# Patient Record
Sex: FEMALE | Race: WHITE | NOT HISPANIC OR LATINO | Employment: FULL TIME | URBAN - METROPOLITAN AREA
[De-identification: names, ages, dates, MRNs, and addresses within clinical notes are randomized per-mention and may not be internally consistent; named-entity substitution may affect disease eponyms.]

---

## 2017-02-20 DIAGNOSIS — F51.01 PRIMARY INSOMNIA: ICD-10-CM

## 2017-02-20 RX ORDER — ZOLPIDEM TARTRATE 10 MG/1
TABLET ORAL
Qty: 15 TABLET | Refills: 0 | Status: SHIPPED | OUTPATIENT
Start: 2017-02-20 | End: 2017-03-24

## 2017-02-20 NOTE — TELEPHONE ENCOUNTER
Pending Prescriptions:                       Disp   Refills    zolpidem (AMBIEN) 10 MG tablet [Pharmacy *15 tab*0            Sig: TAKE ONE-HALF TABLET BY MOUTH EVERY NIGHT AT           BEDTIME AS NEEDED FOR SLEEP          Last Written Prescription Date: 11/23/16  Last Fill Quantity: 15,  # refills: 1   Last Office Visit with [facility], [facility] or [facility] prescribing provider: 7/8/16

## 2017-03-24 DIAGNOSIS — F51.01 PRIMARY INSOMNIA: ICD-10-CM

## 2017-03-24 DIAGNOSIS — E03.9 HYPOTHYROIDISM: ICD-10-CM

## 2017-03-24 RX ORDER — ZOLPIDEM TARTRATE 10 MG/1
TABLET ORAL
Qty: 15 TABLET | Refills: 0 | Status: SHIPPED | OUTPATIENT
Start: 2017-03-24 | End: 2017-05-08

## 2017-03-24 RX ORDER — LEVOTHYROXINE SODIUM 88 UG/1
TABLET ORAL
Qty: 90 TABLET | Refills: 0 | Status: SHIPPED | OUTPATIENT
Start: 2017-03-24 | End: 2017-06-20

## 2017-03-24 NOTE — TELEPHONE ENCOUNTER
Routing refill request to provider for review/approval because:  Drug not on the [facility] refill protocol   Sanjuana Alva RN

## 2017-03-24 NOTE — TELEPHONE ENCOUNTER
zolpidem (AMBIEN) 10 MG tablet 15 tablet 0 2/20/2017         Spoke with patient and let her know that she need to schedule a follow up appointment. She is going to call back to schedule in her school break. PHQ-9 was completed over the phone.    Last Written Prescription Date: 02/20/2017  Last Fill Quantity: 15,  # refills: 0   Last Office Visit with [facility], [facility] or [facility] prescribing provider: 07/08/2016                                             PHQ-9 ([employer]) 3/24/2017   No Interest In Doing Things    Feeling Depressed    Trouble Sleeping    Tired / No Energy    No appetite or Over-Eating    Feeling Bad about Self    Trouble Concentrating    Moving Slow or Restless    Suicidal Thoughts    Total Score    1.  Little interest or pleasure in doing things 0   2.  Feeling down, depressed, or hopeless 0   3.  Trouble falling or staying asleep, or sleeping too much 1   4.  Feeling tired or having little energy 1   5.  Poor appetite or overeating 0   6.  Feeling bad about yourself 0   7.  Trouble concentrating 0   8.  Moving slowly or restless 0   9.  Suicidal or self-harm thoughts 0   PHQ-9 Total Score 2   Difficulty at work, home, or with people Not difficult at all

## 2017-03-24 NOTE — TELEPHONE ENCOUNTER
levothyroxine (SYNTHROID, LEVOTHROID) 88 MCG tablet 90 tablet 1 7/8/2016          Last Written Prescription Date: 07/08/2016  Last Quantity: 90, # refills: 1  Last Office Visit with [facility], [facility] or [facility] prescribing provider: 07/08/2016        TSH   Date Value Ref Range Status   07/08/2016 0.78 0.40 - 4.00 mU/L Final

## 2017-03-25 ASSESSMENT — PATIENT HEALTH QUESTIONNAIRE - PHQ9: SUM OF ALL RESPONSES TO PHQ QUESTIONS 1-9: 2

## 2017-04-17 ENCOUNTER — TELEPHONE (OUTPATIENT)
Dept: OTHER | Facility: CLINIC | Age: 32
End: 2017-04-17

## 2017-04-17 NOTE — TELEPHONE ENCOUNTER
4/17/2017    Call Regarding Onboarding ARE CHOICES    Attempt 1    Message on voicemail     Comments: NO DEP        Outreach   Lisa Chopra

## 2017-04-25 NOTE — TELEPHONE ENCOUNTER
4/25/2017    Call Regarding Onboarding [employer]    Attempt 2    Message on voicemail     Comments: manual dial    Outreach   cnt

## 2017-05-01 NOTE — TELEPHONE ENCOUNTER
5/1/2017    Call Regarding Onboarding [employer] choices     Attempt 3    Message with female    Comments:  Manual dial   Patient got message to call back        Outreach   cnt

## 2017-05-08 DIAGNOSIS — F51.01 PRIMARY INSOMNIA: ICD-10-CM

## 2017-05-08 RX ORDER — ZOLPIDEM TARTRATE 10 MG/1
TABLET ORAL
Qty: 15 TABLET | Refills: 0 | Status: SHIPPED | OUTPATIENT
Start: 2017-05-08 | End: 2017-08-15

## 2017-05-08 NOTE — TELEPHONE ENCOUNTER
Pending Prescriptions:                       Disp   Refills    zolpidem (AMBIEN) 10 MG tablet            15 tab*0                Last Written Prescription Date:  3/24/17  Last Fill Quantity: 15,   # refills: 0  Last Office Visit with [facility], [facility] or [facility] prescribing provider: 7/8/16  Future Office visit:       Routing refill request to provider for review/approval because:  Drug not on the [facility], [facility] or [facility] refill protocol or controlled substance

## 2017-05-09 NOTE — TELEPHONE ENCOUNTER
Faxed rx to    [id number] - [place], [place] - [address] AT [address]

## 2017-06-20 DIAGNOSIS — E03.9 HYPOTHYROIDISM: ICD-10-CM

## 2017-06-20 NOTE — TELEPHONE ENCOUNTER
levothyroxine (SYNTHROID/LEVOTHROID) 88 MCG tablet 90 tablet 0 3/24/2017  No   Sig: TAKE 1 TABLET(88 MCG) BY MOUTH DAILY          Last Written Prescription Date: 03/24/2017  Last Quantity: 90, # refills: 0  Last Office Visit with [facility], [facility] or [facility] prescribing provider: 07/2016        TSH   Date Value Ref Range Status   07/08/2016 0.78 0.40 - 4.00 mU/L Final

## 2017-06-21 RX ORDER — LEVOTHYROXINE SODIUM 88 UG/1
TABLET ORAL
Qty: 90 TABLET | Refills: 0 | Status: SHIPPED | OUTPATIENT
Start: 2017-06-21 | End: 2017-09-26

## 2017-06-26 DIAGNOSIS — F51.01 PRIMARY INSOMNIA: ICD-10-CM

## 2017-06-27 RX ORDER — ZOLPIDEM TARTRATE 10 MG/1
TABLET ORAL
Qty: 15 TABLET | Refills: 0 | OUTPATIENT
Start: 2017-06-27

## 2017-06-27 NOTE — TELEPHONE ENCOUNTER
zolpidem (AMBIEN) 10 MG tablet      Last Written Prescription Date:  5/8/17  Last Fill Quantity: 15,   # refills: 0  Last Office Visit with [facility], [facility] or [facility] prescribing provider: 7/8/16  Future Office visit:       Routing refill request to provider for review/approval because:  Drug not on the [facility], [facility] or [facility] refill protocol or controlled substance        Yamini MOODY(R)

## 2017-08-15 DIAGNOSIS — F51.01 PRIMARY INSOMNIA: ICD-10-CM

## 2017-08-15 DIAGNOSIS — R09.89 THROAT CLEARING: ICD-10-CM

## 2017-08-15 NOTE — TELEPHONE ENCOUNTER
Disp Refills Start End HERMAN   omeprazole (PRILOSEC) 20 MG capsule 30 capsule 3 7/8/2016  No   Sig: Take 1 capsule (20 mg) by mouth daily           Last Written Prescription Date: 07/08/2016  Last Fill Quantity: 30,  # refills: 3    --- doesn't appear taking daily.   Last Office Visit with [facility], [facility] or [facility] prescribing provider: 07/2016 --  I did not send letter to sched appt, route back if we should.                                          '

## 2017-08-16 RX ORDER — ZOLPIDEM TARTRATE 10 MG/1
TABLET ORAL
Qty: 15 TABLET | Refills: 0 | Status: SHIPPED | OUTPATIENT
Start: 2017-08-16 | End: 2017-10-02

## 2017-08-16 NOTE — TELEPHONE ENCOUNTER
Pending Prescriptions:                       Disp   Refills    zolpidem (AMBIEN) 10 MG tablet            15 tab*0            Sig: TAKE ONE-HALF TABLET BY MOUTH EVERY NIGHT AT           BEDTIME AS NEEDED FOR SLEEP          Last Written Prescription Date:  5/8/17  Last Fill Quantity: 15,   # refills: 0  Last Office Visit with [facility], [facility] or [facility] prescribing provider: 7/8/16 Soomar  Future Office visit:       Routing refill request to provider for review/approval because:  Drug not on the [facility], [facility] or [facility] refill protocol or controlled substance    RT Dorothea(R)

## 2017-09-26 DIAGNOSIS — E03.9 HYPOTHYROIDISM: ICD-10-CM

## 2017-09-26 NOTE — TELEPHONE ENCOUNTER
Left VM. Patient long overdue for office visit with PCP    levothyroxine (SYNTHROID/LEVOTHROID) 88 MCG tablet       Last Written Prescription Date: 6/21/2017  Last Quantity: 90, # refills: 0  Last Office Visit with [facility], [facility] or [facility] prescribing provider: 7/8/2016        TSH   Date Value Ref Range Status   07/08/2016 0.78 0.40 - 4.00 mU/L Final

## 2017-09-28 RX ORDER — LEVOTHYROXINE SODIUM 88 UG/1
TABLET ORAL
Qty: 30 TABLET | Refills: 0 | Status: SHIPPED | OUTPATIENT
Start: 2017-09-28 | End: 2017-10-02

## 2017-09-28 NOTE — TELEPHONE ENCOUNTER
Medication is being filled for 1 time refill only due to:  upcoming appt   Rebeca HAWKINS RN    Next 5 appointments (look out 90 days)     Oct 02, 2017 12:30 PM CDT   [facility]    [address] [facility] [address]   [phone]

## 2017-10-02 ENCOUNTER — OFFICE VISIT (OUTPATIENT)
Dept: FAMILY MEDICINE | Facility: CLINIC | Age: 32
End: 2017-10-02

## 2017-10-02 VITALS
OXYGEN SATURATION: 98 % | TEMPERATURE: 98 F | WEIGHT: 136 LBS | DIASTOLIC BLOOD PRESSURE: 70 MMHG | SYSTOLIC BLOOD PRESSURE: 108 MMHG | BODY MASS INDEX: 23.71 KG/M2

## 2017-10-02 DIAGNOSIS — F51.01 PRIMARY INSOMNIA: ICD-10-CM

## 2017-10-02 DIAGNOSIS — Z12.4 SCREENING FOR MALIGNANT NEOPLASM OF CERVIX: ICD-10-CM

## 2017-10-02 DIAGNOSIS — Z23 NEED FOR PROPHYLACTIC VACCINATION AND INOCULATION AGAINST INFLUENZA: ICD-10-CM

## 2017-10-02 DIAGNOSIS — Z00.00 ROUTINE GENERAL MEDICAL EXAMINATION AT A HEALTH CARE FACILITY: Primary | ICD-10-CM

## 2017-10-02 DIAGNOSIS — F41.9 ANXIETY: ICD-10-CM

## 2017-10-02 DIAGNOSIS — R09.89 THROAT CLEARING: ICD-10-CM

## 2017-10-02 DIAGNOSIS — Z30.011 ENCOUNTER FOR INITIAL PRESCRIPTION OF CONTRACEPTIVE PILLS: ICD-10-CM

## 2017-10-02 DIAGNOSIS — Z01.818 PREOP GENERAL PHYSICAL EXAM: ICD-10-CM

## 2017-10-02 DIAGNOSIS — E03.9 HYPOTHYROIDISM, UNSPECIFIED TYPE: ICD-10-CM

## 2017-10-02 DIAGNOSIS — R11.0 NAUSEA: ICD-10-CM

## 2017-10-02 LAB
ERYTHROCYTE [DISTWIDTH] IN BLOOD BY AUTOMATED COUNT: 11.9 % (ref 10–15)
HCT VFR BLD AUTO: 38.3 % (ref 35–47)
HGB BLD-MCNC: 13.1 G/DL (ref 11.7–15.7)
MCH RBC QN AUTO: 32.7 PG (ref 26.5–33)
MCHC RBC AUTO-ENTMCNC: 34.2 G/DL (ref 31.5–36.5)
MCV RBC AUTO: 96 FL (ref 78–100)
PLATELET # BLD AUTO: 261 10E9/L (ref 150–450)
RBC # BLD AUTO: 4.01 10E12/L (ref 3.8–5.2)
WBC # BLD AUTO: 6.1 10E9/L (ref 4–11)

## 2017-10-02 PROCEDURE — 99395 PREV VISIT EST AGE 18-39: CPT | Mod: 25 | Performed by: INTERNAL MEDICINE

## 2017-10-02 PROCEDURE — 82947 ASSAY GLUCOSE BLOOD QUANT: CPT | Performed by: INTERNAL MEDICINE

## 2017-10-02 PROCEDURE — 36415 COLL VENOUS BLD VENIPUNCTURE: CPT | Performed by: INTERNAL MEDICINE

## 2017-10-02 PROCEDURE — 90471 IMMUNIZATION ADMIN: CPT | Performed by: INTERNAL MEDICINE

## 2017-10-02 PROCEDURE — 87624 HPV HI-RISK TYP POOLED RSLT: CPT | Performed by: INTERNAL MEDICINE

## 2017-10-02 PROCEDURE — 90686 IIV4 VACC NO PRSV 0.5 ML IM: CPT | Performed by: INTERNAL MEDICINE

## 2017-10-02 PROCEDURE — 85027 COMPLETE CBC AUTOMATED: CPT | Performed by: INTERNAL MEDICINE

## 2017-10-02 PROCEDURE — G0145 SCR C/V CYTO,THINLAYER,RESCR: HCPCS | Performed by: INTERNAL MEDICINE

## 2017-10-02 PROCEDURE — 99212 OFFICE O/P EST SF 10 MIN: CPT | Mod: 25 | Performed by: INTERNAL MEDICINE

## 2017-10-02 PROCEDURE — 84443 ASSAY THYROID STIM HORMONE: CPT | Performed by: INTERNAL MEDICINE

## 2017-10-02 PROCEDURE — 80061 LIPID PANEL: CPT | Performed by: INTERNAL MEDICINE

## 2017-10-02 PROCEDURE — G0124 SCREEN C/V THIN LAYER BY MD: HCPCS | Performed by: INTERNAL MEDICINE

## 2017-10-02 RX ORDER — ZOLPIDEM TARTRATE 10 MG/1
5 TABLET ORAL
Qty: 15 TABLET | Refills: 5 | Status: SHIPPED | OUTPATIENT
Start: 2017-10-02 | End: 2018-04-02

## 2017-10-02 RX ORDER — NORGESTIMATE AND ETHINYL ESTRADIOL 7DAYSX3 28
1 KIT ORAL DAILY
Qty: 84 TABLET | Refills: 3 | Status: SHIPPED | OUTPATIENT
Start: 2017-10-02 | End: 2019-03-18

## 2017-10-02 RX ORDER — LEVOTHYROXINE SODIUM 88 UG/1
TABLET ORAL
Qty: 90 TABLET | Refills: 3 | Status: SHIPPED | OUTPATIENT
Start: 2017-10-02 | End: 2017-10-03

## 2017-10-02 RX ORDER — ONDANSETRON 4 MG/1
4 TABLET, ORALLY DISINTEGRATING ORAL EVERY 6 HOURS PRN
Qty: 20 TABLET | Refills: 1 | Status: SHIPPED | OUTPATIENT
Start: 2017-10-02 | End: 2018-07-06

## 2017-10-02 RX ORDER — CITALOPRAM HYDROBROMIDE 20 MG/1
20 TABLET ORAL DAILY
Qty: 90 TABLET | Refills: 3 | Status: SHIPPED | OUTPATIENT
Start: 2017-10-02 | End: 2019-03-18

## 2017-10-02 ASSESSMENT — PATIENT HEALTH QUESTIONNAIRE - PHQ9: SUM OF ALL RESPONSES TO PHQ QUESTIONS 1-9: 5

## 2017-10-02 NOTE — NURSING NOTE
"Chief Complaint   Patient presents with     Physical       Initial /70 (BP Location: Right arm, Patient Position: Sitting, Cuff Size: Adult Regular)  Temp 98  F (36.7  C) (Oral)  Wt 136 lb (61.7 kg)  SpO2 98%  Breastfeeding? No  BMI 23.71 kg/m2 Estimated body mass index is 23.71 kg/(m^2) as calculated from the following:    Height as of 7/8/16: 5' 3.5\" (1.613 m).    Weight as of this encounter: 136 lb (61.7 kg).  Medication Reconciliation: complete     Ezra Garibay CMA     "

## 2017-10-02 NOTE — LETTER
November 10, 2017    Babita MATT Hartley  [address] [address]      Dear ,      This letter is in regards to your recent cervical cancer screening (Pap smear and HPV test).    Your Pap smear result was reported as ASCUS or Atypical Squamous Cells of Undetermined Significance. This means that there were mildly abnormal cells found in the sample that we collected from your cervix, but no cancer cells were found. The vast majority of patients with this result do not have significant cervical abnormalities.     Your cervical sample was also tested for the presence of Human Papillomavirus (HPV). Your HPV test is NEGATIVE for high risk HPV, meaning that no HPV was found at this time.     Over time, your body can get rid of these abnormal cells, so it is recommended that you repeat your pap and HPV in 3 years.    If you have questions about these results contact [phone]    Please continue to be seen every year for an annual physical exam and other preventative tests.         Sincerely,    Staci Evans MD/[facility]

## 2017-10-02 NOTE — PROGRESS NOTES
SUBJECTIVE:   CC: Babita Hartley is an 32 year old woman who presents for preventive health visit.     Healthy Habits:    Do you get at least three servings of calcium containing foods daily (dairy, green leafy vegetables, etc.)? yes    Amount of exercise or daily activities, outside of work: 4 day(s) per week    Problems taking medications regularly No    Medication side effects: No    Have you had an eye exam in the past two years? No, but no vision concerns    Do you see a dentist twice per year? yes    Do you have sleep apnea, excessive snoring or daytime drowsiness?no        Today's PHQ-2 Score:   PHQ-2 ( 1999 [employer]) 7/8/2016 3/3/2016   Q1: Little interest or pleasure in doing things 1 0   Q2: Feeling down, depressed or hopeless 1 0   PHQ-2 Score 2 0         Abuse: Current or Past(Physical, Sexual or Emotional)- No  Do you feel safe in your environment - Yes  Social History   Substance Use Topics     Smoking status: Never Smoker     Smokeless tobacco: Never Used     Alcohol use Yes      Comment: Once a week     The patient does not drink >3 drinks per day nor >7 drinks per week.    Reviewed orders with patient.  Reviewed health maintenance and updated orders accordingly - Yes  Labs reviewed in EPIC    Mammogram not appropriate for this patient based on age.    Pertinent mammograms are reviewed under the imaging tab.  History of abnormal Pap smear: NO - age 30- 65 PAP every 3 years recommended    Reviewed and updated as needed this visit by clinical staff  Tobacco  Allergies  Meds         Reviewed and updated as needed this visit by Provider        Past Medical History:   Diagnosis Date     Anxiety      Constipation      Ectopic pregnancy 9/26/11     Thyroid disease     hypothyroidism      Past Surgical History:   Procedure Laterality Date     BREAST SURGERY      breast reduction surgery     ECTOPIC PREGNANCY SURGERY       LAPAROSCOPIC SALPINGOTOMY  9/26/11    right for ectopic       ROS:  C: NEGATIVE  for fever, chills, change in weight  I: NEGATIVE for worrisome rashes, moles or lesions  E: NEGATIVE for vision changes or irritation  ENT: NEGATIVE for ear, mouth and throat problems  R: NEGATIVE for significant cough or SOB  B: NEGATIVE for masses, tenderness or discharge  CV: NEGATIVE for chest pain, palpitations or peripheral edema  GI: NEGATIVE for nausea, abdominal pain, heartburn, or change in bowel habits  : NEGATIVE for unusual urinary or vaginal symptoms. Periods are regular.  Today's her last day of menstrual period  M: NEGATIVE for significant arthralgias or myalgia  N: NEGATIVE for weakness, dizziness or paresthesias  P: NEGATIVE for changes in mood or affect    Patient had breast reduction surgery and developed reaction to the sutures  She is going to have revision of scar by Dr. Joaquin Gill at [facility]  She is requesting preop also to be done today  No history of any anesthesia complications   No family history of any anesthesia complications.  She wants to start Ortho Tri-Cyclen that she has used in the past  OBJECTIVE:   /70 (BP Location: Right arm, Patient Position: Sitting, Cuff Size: Adult Regular)  Temp 98  F (36.7  C) (Oral)  Wt 136 lb (61.7 kg)  SpO2 98%  Breastfeeding? No  BMI 23.71 kg/m2  EXAM:  GENERAL: healthy, alert and no distress  EYES: Eyes grossly normal to inspection, PERRL and conjunctivae and sclerae normal  HENT: ear canals and TM's normal, nose and mouth without ulcers or lesions  NECK: no adenopathy, no asymmetry, masses, or scars and thyroid normal to palpation  RESP: lungs clear to auscultation - no rales, rhonchi or wheezes  BREAST: normal without masses, tenderness or nipple discharge and no palpable axillary masses or adenopathy  She has a scar of breast reduction surgery  She has some keloid formation at the scar  CV: regular rate and rhythm, normal S1 S2, no S3 or S4, no murmur, click or rub, no peripheral edema and peripheral pulses  strong  ABDOMEN: soft, nontender, no hepatosplenomegaly, no masses and bowel sounds normal   (female): normal female external genitalia, normal urethral meatus, vaginal mucosa pink, moist, well rugated, and normal cervix/adnexa/uterus without masses   Today was the last day after. Subjectively there was some dark discharge  Pap test was performed  MS: no gross musculoskeletal defects noted, no edema  SKIN: no suspicious lesions or rashes  NEURO: Normal strength and tone, mentation intact and speech normal  PSYCH: mentation appears normal, affect normal/bright  Lab Results   Component Value Date    WBC 5.5 07/08/2016     Lab Results   Component Value Date    RBC 4.15 07/08/2016     Lab Results   Component Value Date    HGB 13.4 07/08/2016     Lab Results   Component Value Date    HCT 39.5 07/08/2016     No components found for: MCT  Lab Results   Component Value Date    MCV 95 07/08/2016     Lab Results   Component Value Date    MCH 32.3 07/08/2016     Lab Results   Component Value Date    MCHC 33.9 07/08/2016     Lab Results   Component Value Date    RDW 12.3 07/08/2016     Lab Results   Component Value Date     07/08/2016       ASSESSMENT/PLAN:   Babita was seen today for physical.    Diagnoses and all orders for this visit:    Routine general medical examination at a health care facility  -     TSH  -     CBC with platelets  -     Lipid panel reflex to direct LDL  -     Glucose    Screening for malignant neoplasm of cervix  -     HPV High Risk Types DNA Cervical  -     Pap imaged thin layer screen with HPV - recommended age 30 - 65 years (select HPV order below)    Need for prophylactic vaccination and inoculation against influenza  -     FLU VAC, SPLIT VIRUS IM > 3 YO (QUADRIVALENT) [75175]  -     Vaccine Administration, Initial [01270]    Hypothyroidism, unspecified type  -     levothyroxine (SYNTHROID/LEVOTHROID) 88 MCG tablet; Take one tablet by mouth daily Except Monday and Friday take 0.5 tablet (44  mcg) daily  -     TSH    Anxiety  -     citalopram (CELEXA) 20 MG tablet; Take 1 tablet (20 mg) by mouth daily  Patient states this is working really well    Throat clearing  -     omeprazole (PRILOSEC) 20 MG CR capsule; Take 1 capsule (20 mg) by mouth daily  This medication helps acid reflux  Patient is advised that PPI's affect calcium absorption so make sure take adequate calcium with vit D     Primary insomnia  -     zolpidem (AMBIEN) 10 MG tablet; Take 0.5 tablets (5 mg) by mouth nightly as needed for sleep TAKE ONE-HALF TABLET BY MOUTH EVERY NIGHT AT BEDTIME AS NEEDED FOR SLEEP  She is educated about this medication and she uses that as needed  She has tried trazodone in the past with no success  Uses melatonin as needed    Nausea  -     ondansetron (ZOFRAN ODT) 4 MG ODT tab; Take 1 tablet (4 mg) by mouth every 6 hours as needed for nausea  Patient requested that as she feels nauseous after surgery    Preop general physical exam  -     CBC with platelets  She had breast reduction surgery and now going to have revision of the scar at [facility] by Dr. Joaquin Ambriz  Pre-op exam is also done  She is optimal for this upcoming procedure  Avoid aspirin 10 days before the surgery. Avoid nonsteroidal anti-inflammatory pain medication like ibuprofen, Motrin, or Aleve 3 days before the surgery.  Tylenol is okay to use for pain.  Avoid any OTC multivitamins or herbal supplement 7 days before surgery   Resume after surgery    Encounter for initial prescription of contraceptive pills  -     norgestim-eth estrad triphasic (TRINESSA, 28,) 0.18/0.215/0.25 MG-35 MCG per tablet; Take 1 tablet by mouth daily  Educated about this   She requested orthotricyclen.  Other orders  -     DEPRESSION ACTION PLAN (DAP)          COUNSELING:   Reviewed preventive health counseling, as reflected in patient instructions       Regular exercise       Healthy diet/nutrition         reports that she has never smoked. She has never  "used smokeless tobacco.    Estimated body mass index is 23.71 kg/(m^2) as calculated from the following:    Height as of 7/8/16: 5' 3.5\" (1.613 m).    Weight as of this encounter: 136 lb (61.7 kg).     Educated her about birth control pills  Follow up in one year for physical   Seek sooner medical attention if there is any worsening of symptoms or problems.    see patient's instruction    Counseling Resources:  ATP IV Guidelines  Pooled Cohorts Equation Calculator  Breast Cancer Risk Calculator  FRAX Risk Assessment  ICSI Preventive Guidelines  Dietary Guidelines for Americans, 2010  USDA's MyPlate  ASA Prophylaxis  Lung CA Screening    Staci Evans MD  [facility]  "

## 2017-10-02 NOTE — LETTER
[place] [address] [facility]  [address]  [place]  [address]  Tel: [phone]    October 3, 2017    Babita MATT Odilia  [address]  [place] [address]        Dear Ms. Hartley,    TSH which is thyroid hormone is elevated   Increase levothyroxine to 100  mcg po daily  Recheck TSH in 2 months.  Orders are placed.  New script is [facility] to [facility].  Make lab appointment in 2 months  Your total cholesterol is normal.  HDL which is called good cholesterol is normal.  Your LDL cholesterol is elevated.  Your triglycerides are normal.  Eat low cholesterol low fat  diet and do regular physical activity.  Glucose which is your blood sugar is normal.  CBC result which includes white count Hemoglobin and  Platelet Counts is normal.     If you have any further questions or problems, please contact our office.      Sincerely,    Staci Evans MD / [facility]      Resulted Orders   TSH   Result Value Ref Range    TSH 5.10 (H) 0.40 - 4.00 mU/L   CBC with platelets   Result Value Ref Range    WBC 6.1 4.0 - 11.0 10e9/L    RBC Count 4.01 3.8 - 5.2 10e12/L    Hemoglobin 13.1 11.7 - 15.7 g/dL    Hematocrit 38.3 35.0 - 47.0 %    MCV 96 78 - 100 fl    MCH 32.7 26.5 - 33.0 pg    MCHC 34.2 31.5 - 36.5 g/dL    RDW 11.9 10.0 - 15.0 %    Platelet Count 261 150 - 450 10e9/L   Lipid panel reflex to direct LDL   Result Value Ref Range    Cholesterol 195 <200 mg/dL    Triglycerides 87 <150 mg/dL    HDL Cholesterol 54 >49 mg/dL    LDL Cholesterol Calculated 124 (H) <100 mg/dL      Comment:      Above desirable:  100-129 mg/dl  Borderline High:  130-159 mg/dL  High:             160-189 mg/dL  Very high:       >189 mg/dl      Non HDL Cholesterol 141 (H) <130 mg/dL      Comment:      Above Desirable:  130-159 mg/dl  Borderline high:  160-189 mg/dl  High:             190-219 mg/dl  Very high:       >219 mg/dl     Glucose   Result Value Ref Range    Glucose 83 70 - 99 mg/dL

## 2017-10-02 NOTE — MR AVS SNAPSHOT
After Visit Summary   10/2/2017    Babita Hartley    MRN: [id number]           Patient Information     Date Of Birth          1985        Visit Information        Provider Department      10/2/2017 12:30 PM Staci Evans MD [facility]        Today's Diagnoses     Routine general medical examination at a health care facility    -  1    Screening for malignant neoplasm of cervix        Need for prophylactic vaccination and inoculation against influenza        Hypothyroidism, unspecified type        Anxiety        Throat clearing        Primary insomnia        Nausea        Preop general physical exam        Encounter for initial prescription of contraceptive pills          Care Instructions      Preventive Health Recommendations  Female Ages 26 - 39  Yearly exam:   See your health care provider every year in order to    Review health changes.     Discuss preventive care.      Review your medicines if you your doctor has prescribed any.    Until age 30: Get a Pap test every three years (more often if you have had an abnormal result).    After age 30: Talk to your doctor about whether you should have a Pap test every 3 years or have a Pap test with HPV screening every 5 years.   You do not need a Pap test if your uterus was removed (hysterectomy) and you have not had cancer.  You should be tested each year for STDs (sexually transmitted diseases), if you're at risk.   Talk to your provider about how often to have your cholesterol checked.  If you are at risk for diabetes, you should have a diabetes test (fasting glucose).  Shots: Get a flu shot each year. Get a tetanus shot every 10 years.   Nutrition:     Eat at least 5 servings of fruits and vegetables each day.    Eat whole-grain bread, whole-wheat pasta and brown rice instead of white grains and rice.    Talk to your provider about Calcium and Vitamin D.     Lifestyle    Exercise at least 150 minutes a week (30 minutes a day, 5  "days of the week). This will help you control your weight and prevent disease.    Limit alcohol to one drink per day.    No smoking.     Wear sunscreen to prevent skin cancer.    See your dentist every six months for an exam and cleaning.        Avoid aspirin 10 days before the surgery. Avoid nonsteroidal anti-inflammatory pain medication like ibuprofen, Motrin, or Aleve 3 days before the surgery.  Tylenol is okay to use for pain.  Avoid any OTC multivitamins or herbal supplement 7 days before surgery   Resume after surgery    Labs today  Follow up in one year for physical   Seek sooner medical attention if there is any worsening of symptoms or problems.                  Follow-ups after your visit        Who to contact     If you have questions or need follow up information about today's clinic visit or your schedule please contact [facility] directly at [phone].  Normal or non-critical lab and imaging results will be communicated to you by Easy Bill Onlinehart, letter or phone within 4 business days after the clinic has received the results. If you do not hear from us within 7 days, please contact the clinic through Easy Bill Onlinehart or phone. If you have a critical or abnormal lab result, we will notify you by phone as soon as possible.  Submit refill requests through iVideosongs or call your pharmacy and they will forward the refill request to us. Please allow 3 business days for your refill to be completed.          Additional Information About Your Visit        iVideosongs Information     iVideosongs lets you send messages to your doctor, view your test results, renew your prescriptions, schedule appointments and more. To sign up, go to [email or web address] . Click on \"Log in\" on the left side of the screen, which will take you to the Welcome page. Then click on \"Sign up Now\" on the right side of the page.     You will be asked to enter the access code listed below, as well as some personal information. Please follow the " directions to create your username and password.     Your access code is: [email or web address]  Expires: 2017 12:56 PM     Your access code will  in 90 days. If you need help or a new code, please call your [facility] clinic or [phone].        Care EveryWhere ID     This is your Care EveryWhere ID. This could be used by other organizations to access your [facility] medical records  [id number]        Your Vitals Were     Temperature Pulse Oximetry Breastfeeding? BMI (Body Mass Index)          98  F (36.7  C) (Oral) 98% No 23.71 kg/m2         Blood Pressure from Last 3 Encounters:   10/02/17 108/70   16 107/64   16 110/62    Weight from Last 3 Encounters:   10/02/17 136 lb (61.7 kg)   16 137 lb (62.1 kg)   16 139 lb (63 kg)              We Performed the Following     CBC with platelets     DEPRESSION ACTION PLAN (DAP)     DEPRESSION ACTION PLAN (DAP)     FLU VAC, SPLIT VIRUS IM > 3 YO (QUADRIVALENT) [42665]     Glucose     HPV High Risk Types DNA Cervical     Lipid panel reflex to direct LDL     Pap imaged thin layer screen with HPV - recommended age 30 - 65 years (select HPV order below)     TSH     Vaccine Administration, Initial [00129]          Today's Medication Changes          These changes are accurate as of: 10/2/17 12:56 PM.  If you have any questions, ask your nurse or doctor.               Start taking these medicines.        Dose/Directions    norgestim-eth estrad triphasic 0.18/0.215/0.25 MG-35 MCG per tablet   Commonly known as:  TRINESSA (28)   Used for:  Encounter for initial prescription of contraceptive pills   Started by:  Staci Evans MD        Dose:  1 tablet   Take 1 tablet by mouth daily   Quantity:  84 tablet   Refills:  3         These medicines have changed or have updated prescriptions.        Dose/Directions    levothyroxine 88 MCG tablet   Commonly known as:  SYNTHROID/LEVOTHROID   This may have changed:  See the new instructions.   Used for:   Hypothyroidism, unspecified type   Changed by:  Staci Evans MD        Take one tablet by mouth daily Except Monday and Friday take 0.5 tablet (44 mcg) daily   Quantity:  90 tablet   Refills:  3       omeprazole 20 MG CR capsule   Commonly known as:  priLOSEC   This may have changed:  See the new instructions.   Used for:  Throat clearing   Changed by:  Staci Evans MD        Dose:  20 mg   Take 1 capsule (20 mg) by mouth daily   Quantity:  90 capsule   Refills:  3       ondansetron 4 MG ODT tab   Commonly known as:  ZOFRAN ODT   This may have changed:  See the new instructions.   Used for:  Nausea   Changed by:  Staci Evans MD        Dose:  4 mg   Take 1 tablet (4 mg) by mouth every 6 hours as needed for nausea   Quantity:  20 tablet   Refills:  1       zolpidem 10 MG tablet   Commonly known as:  AMBIEN   This may have changed:    - how much to take  - how to take this  - when to take this  - reasons to take this   Used for:  Primary insomnia   Changed by:  Staci Evans MD        Dose:  5 mg   Take 0.5 tablets (5 mg) by mouth nightly as needed for sleep TAKE ONE-HALF TABLET BY MOUTH EVERY NIGHT AT BEDTIME AS NEEDED FOR SLEEP   Quantity:  15 tablet   Refills:  5            Where to get your medicines      These medications were sent to [facility] [address] [place] [address] & [address]  [address] [place] [id number]     Phone:  [phone]     citalopram 20 MG tablet    levothyroxine 88 MCG tablet    norgestim-eth estrad triphasic 0.18/0.215/0.25 MG-35 MCG per tablet    omeprazole 20 MG CR capsule    ondansetron 4 MG ODT tab         Some of these will need a paper prescription and others can be bought over the counter.  Ask your nurse if you have questions.     Bring a paper prescription for each of these medications     zolpidem 10 MG tablet                Primary Care Provider Office Phone # Fax #    Staci Evans [phone]        [address]  [facility] [address]        Equal Access to Services     GREGORYFAN CARLOS : Hadii aad ku hadmaddieo Sosaniyaali, waaxda luqadaha, qaybta kacarlosda osbaldowon, anat waldrop nikhilmatthew diazsteffernando brown. So [facility] [phone].    ATENCIÓN: Si habla español, tiene a jacobsen disposición servicios gratuitos de asistencia lingüística. Llame al [phone].    We comply with applicable federal civil rights laws and [place] laws. We do not discriminate on the basis of race, color, national origin, age, disability, sex, sexual orientation, or gender identity.            Thank you!     Thank you for choosing [facility]  for your care. Our goal is always to provide you with excellent care. Hearing back from our patients is one way we can continue to improve our services. Please take a few minutes to complete the written survey that you may receive in the mail after your visit with us. Thank you!             Your Updated Medication List - Protect others around you: Learn how to safely use, store and throw away your medicines at www.disposemymeds.org.          This list is accurate as of: 10/2/17 12:56 PM.  Always use your most recent med list.                   Brand Name Dispense Instructions for use Diagnosis    citalopram 20 MG tablet    celeXA    90 tablet    Take 1 tablet (20 mg) by mouth daily    Anxiety       levothyroxine 88 MCG tablet    SYNTHROID/LEVOTHROID    90 tablet    Take one tablet by mouth daily Except Monday and Friday take 0.5 tablet (44 mcg) daily    Hypothyroidism, unspecified type       norgestim-eth estrad triphasic 0.18/0.215/0.25 MG-35 MCG per tablet    TRINESSA (28)    84 tablet    Take 1 tablet by mouth daily    Encounter for initial prescription of contraceptive pills       omeprazole 20 MG CR capsule    priLOSEC    90 capsule    Take 1 capsule (20 mg) by mouth daily    Throat clearing       ondansetron 4 MG ODT tab    ZOFRAN ODT    20 tablet    Take 1 tablet  (4 mg) by mouth every 6 hours as needed for nausea    Nausea       zolpidem 10 MG tablet    AMBIEN    15 tablet    Take 0.5 tablets (5 mg) by mouth nightly as needed for sleep TAKE ONE-HALF TABLET BY MOUTH EVERY NIGHT AT BEDTIME AS NEEDED FOR SLEEP    Primary insomnia

## 2017-10-02 NOTE — LETTER
October 13, 2017    Babita MATT Odilia  [address]  [place] [address]      Dear ,      This letter is in regards to your recent cervical cancer screening (Pap smear and HPV test).    Your Pap smear result was reported as ASCUS or Atypical Squamous Cells of Undetermined Significance. This means that there were mildly abnormal cells found in the sample that we collected from your cervix, but no cancer cells were found. The vast majority of patients with this result do not have significant cervical abnormalities.     Your cervical sample was also tested for the presence of Human Papillomavirus (HPV). Your HPV test is NEGATIVE for high risk HPV, meaning that no HPV was found at this time.     Over time, your body can get rid of these abnormal cells, so it is recommended that you repeat your pap and HPV in 3 years.    If you have questions about these results contact [phone]    Please continue to be seen every year for an annual physical exam and other preventative tests.         Sincerely,    Staci Evans MD/[facility]

## 2017-10-03 DIAGNOSIS — E03.9 HYPOTHYROIDISM, UNSPECIFIED TYPE: ICD-10-CM

## 2017-10-03 LAB
CHOLEST SERPL-MCNC: 195 MG/DL
GLUCOSE SERPL-MCNC: 83 MG/DL (ref 70–99)
HDLC SERPL-MCNC: 54 MG/DL
LDLC SERPL CALC-MCNC: 124 MG/DL
NONHDLC SERPL-MCNC: 141 MG/DL
TRIGL SERPL-MCNC: 87 MG/DL
TSH SERPL DL<=0.005 MIU/L-ACNC: 5.1 MU/L (ref 0.4–4)

## 2017-10-03 RX ORDER — LEVOTHYROXINE SODIUM 100 UG/1
100 TABLET ORAL DAILY
Qty: 90 TABLET | Refills: 0 | Status: SHIPPED | OUTPATIENT
Start: 2017-10-03 | End: 2018-02-24

## 2017-10-03 NOTE — PROGRESS NOTES
Please notify patient   TSH which is thyroid hormone is elevated   Increase levothyroxine to 100  mcg po daily  Recheck TSH in 2 months.  Orders are placed.  [facility] script [facility] to [facility].  Make lab appointment in 2 months  Your total cholesterol is normal.  HDL which is called good cholesterol is normal.  Your LDL cholesterol is elevated.  Your triglycerides are normal.  Eat low cholesterol low fat  diet and do regular physical activity.  Glucose which is your blood sugar is normal.  CBC result which includes white count Hemoglobin and  Platelet Counts is normal.   Please send the copy of lab results also to this patient.

## 2017-10-05 LAB
COPATH REPORT: ABNORMAL
PAP: ABNORMAL

## 2017-10-09 LAB
FINAL DIAGNOSIS: NORMAL
HPV HR 12 DNA CVX QL NAA+PROBE: NEGATIVE
HPV16 DNA SPEC QL NAA+PROBE: NEGATIVE
HPV18 DNA SPEC QL NAA+PROBE: NEGATIVE
SPECIMEN DESCRIPTION: NORMAL

## 2017-10-10 NOTE — PROGRESS NOTES
Have this patient see gynecologist and let them make decision if she needs colposcopy or not?  I will continue yearly pap until both are negative.

## 2017-10-12 PROBLEM — R87.610 ASCUS OF CERVIX WITH NEGATIVE HIGH RISK HPV: Status: ACTIVE | Noted: 2017-10-02

## 2017-10-13 ENCOUNTER — TELEPHONE (OUTPATIENT)
Dept: FAMILY MEDICINE | Facility: CLINIC | Age: 32
End: 2017-10-13

## 2017-10-13 DIAGNOSIS — B37.31 YEAST INFECTION OF THE VAGINA: Primary | ICD-10-CM

## 2017-10-13 RX ORDER — FLUCONAZOLE 150 MG/1
150 TABLET ORAL
Qty: 3 TABLET | Refills: 0 | Status: SHIPPED | OUTPATIENT
Start: 2017-10-13 | End: 2018-12-12

## 2017-10-13 NOTE — TELEPHONE ENCOUNTER
Reason for Call:  Medication or medication refill:    Do you use a [facility]?  Name of the pharmacy and phone number for the current request:     [facility] - [place], [place] - [address] [facility] - [place], [place] - [address] [place] [address] & [address]    Name of the medication requested: ?    Other request: pt is requesting new rx. Pt would not divulge any other info. Please advise    Can we leave a detailed message on this number? YES    Phone number patient can be reached at: Home number on file [phone] (home)    Best Time: any    Call taken on 10/13/2017 at 11:44 AM by Marco Chen

## 2017-10-13 NOTE — TELEPHONE ENCOUNTER
Take diflucan one tablet  Scrip is sent to pharmacy  Follow up if symptoms does not improve.  Dr.Nasima Nathan MD

## 2017-10-13 NOTE — TELEPHONE ENCOUNTER
Patient called back she is wanting Diflucan called in     [id number] - [place], [place] - [address] & [facility]

## 2017-10-13 NOTE — TELEPHONE ENCOUNTER
Spoke with patient. At office visit last Dr. Evans mentioned a slight yeast infection. Patient declined abx at the time due to being asymptomatic.    Dr. Evans, Patient is now having itching and other symptoms and requesting abx. Pharmacy selected.     Please advise

## 2018-02-24 DIAGNOSIS — E03.9 HYPOTHYROIDISM, UNSPECIFIED TYPE: ICD-10-CM

## 2018-02-24 NOTE — TELEPHONE ENCOUNTER
"Requested Prescriptions   Pending Prescriptions Disp Refills     levothyroxine (SYNTHROID/LEVOTHROID) 100 MCG tablet [Pharmacy Med Name: LEVOTHYROXINE 0.100MG (100MCG) TAB]  Last Written Prescription Date:  10/3/17  Last Fill Quantity: 90 talet,  # refills: 0   Last office visit: 10/2/2017 with prescribing provider:  Nathan   Future Office Visit:   90 tablet 0     Sig: TAKE 1 TABLET BY MOUTH EVERY DAY    Thyroid Protocol Failed    2/24/2018  1:52 PM       Failed - Normal TSH on file in past 12 months    Recent Labs   Lab Test  10/02/17   1314   TSH  5.10*             Failed - No positive pregnancy test in past 12 months    If patient is pregnant or has had a positive pregnancy test, please check TSH.         Passed - Patient is 12 years or older       Passed - Recent or future visit with authorizing provider's specialty    Patient had office visit in the last year or has a visit in the next 30 days with authorizing provider.  See \"Patient Info\" tab in inbasket, or \"Choose Columns\" in Meds & Orders section of the refill encounter.            Passed - No active pregnancy on record    If patient is pregnant or has had a positive pregnancy test, please check TSH.            "

## 2018-02-26 NOTE — TELEPHONE ENCOUNTER
Left VM for patient. Due for F/U TSH testing in December.     Please route back to refills if patient returns call.     Thank you,   Lynette SOTO RN

## 2018-02-27 ENCOUNTER — NURSE TRIAGE (OUTPATIENT)
Dept: NURSING | Facility: CLINIC | Age: 33
End: 2018-02-27

## 2018-02-27 ENCOUNTER — TELEPHONE (OUTPATIENT)
Dept: FAMILY MEDICINE | Facility: CLINIC | Age: 33
End: 2018-02-27

## 2018-02-27 RX ORDER — LEVOTHYROXINE SODIUM 100 UG/1
100 TABLET ORAL DAILY
Qty: 30 TABLET | Refills: 0 | Status: SHIPPED | OUTPATIENT
Start: 2018-02-27 | End: 2018-04-03

## 2018-02-27 NOTE — TELEPHONE ENCOUNTER
Clinic Action Needed:  Yes, callback/refill  FNA Triage Call  Presenting Problem:    Babita is returning a call from clinic and FNA relayed message about due for a TSH in December.  Today Babita is requesting a refill for   Synthroid medication as she is almost out.  Please phone Babita today at [phone] as Babita cannot wait until December.    Routed to:  RN Pool  Please be sure to close this encounter once this patient's issue/question has been addressed.    Nancy Nicholson RN/[facility]

## 2018-02-27 NOTE — TELEPHONE ENCOUNTER
Medication is filled for 30 days  She is due for TSH testing.  Notify patient .  Dr.Nasima Nathan MD

## 2018-02-27 NOTE — TELEPHONE ENCOUNTER
Babita is returning a call from clinic and FNA relayed message about due for a TSH in December.  Today Babita is requesting a refill for   Synthroid medication as she is almost out.  Please phone Babita today at [phone] as Babita cannot wait until December.

## 2018-02-27 NOTE — TELEPHONE ENCOUNTER
"Last Written Prescription Date:  10/3/17  Last Fill Quantity: 90,  # refills: 0   Last office visit: 10/2/2017    Future Office Visit:      Requested Prescriptions   Pending Prescriptions Disp Refills     levothyroxine (SYNTHROID/LEVOTHROID) 100 MCG tablet [Pharmacy Med Name: LEVOTHYROXINE 0.100MG (100MCG) TAB] 90 tablet 0     Sig: TAKE 1 TABLET BY MOUTH EVERY DAY    Thyroid Protocol Failed    2/27/2018  9:29 AM       Failed - Normal TSH on file in past 12 months    Recent Labs   Lab Test  10/02/17   1314   TSH  5.10*             Failed - No positive pregnancy test in past 12 months    If patient is pregnant or has had a positive pregnancy test, please check TSH.         Passed - Patient is 12 years or older       Passed - Recent or future visit with authorizing provider's specialty    Patient had office visit in the last year or has a visit in the next 30 days with authorizing provider.  See \"Patient Info\" tab in inbasket, or \"Choose Columns\" in Meds & Orders section of the refill encounter.            Passed - No active pregnancy on record    If patient is pregnant or has had a positive pregnancy test, please check TSH.            "

## 2018-02-27 NOTE — TELEPHONE ENCOUNTER
Clinic Action Needed:  Yes, callback/refill  FNA Triage Call  Presenting Problem:    Babita is returning a call from clinic and FNA relayed message about due for a TSH in December.  Today Babita is requesting a refill for   Synthroid medication as she is almost out.  Please phone Babita today at [phone] as Babita cannot wait until December.    Routed to:  RN Pool  Please be sure to close this encounter once this patient's issue/question has been addressed.    Nancy Nicholson RN/[facility]

## 2018-02-27 NOTE — TELEPHONE ENCOUNTER
Routing refill request to provider for review/approval because:  Pt did not follow up following Levothyroxine dose change back in October.        Notes Recorded by Staci Evans MD on 10/3/2017 at 7:49 AM  Please notify patient   TSH which is thyroid hormone is elevated   Increase levothyroxine to 100  mcg po daily  Recheck TSH in 2 months.  Orders are placed.  [facility].  Make lab appointment in 2 months  Your total cholesterol is normal.  HDL which is called good cholesterol is normal.  Your LDL cholesterol is elevated.  Your triglycerides are normal.  Eat low cholesterol low fat  diet and do regular physical activity.  Glucose which is your blood sugar is normal.  CBC result which includes white count Hemoglobin and  Platelet Counts is normal.   Please send the copy of lab results also to this patient.    Jeannie Lynn RN  [employer]

## 2018-03-08 NOTE — TELEPHONE ENCOUNTER
Called pt lm to call and schedule lab appt.  Notified of 1 refill and needs to do lab f/u for thyroid  Order in chart from Dr Evans

## 2018-03-20 DIAGNOSIS — L70.0 ACNE VULGARIS: Primary | ICD-10-CM

## 2018-03-20 RX ORDER — TRETINOIN 0.25 MG/G
CREAM TOPICAL
Qty: 45 G | Refills: 11 | Status: SHIPPED | OUTPATIENT
Start: 2018-03-20 | End: 2020-01-31

## 2018-04-02 ENCOUNTER — TELEPHONE (OUTPATIENT)
Dept: FAMILY MEDICINE | Facility: CLINIC | Age: 33
End: 2018-04-02

## 2018-04-02 DIAGNOSIS — F51.01 PRIMARY INSOMNIA: ICD-10-CM

## 2018-04-02 DIAGNOSIS — E03.9 HYPOTHYROIDISM, UNSPECIFIED TYPE: ICD-10-CM

## 2018-04-02 LAB
T4 FREE SERPL-MCNC: 0.87 NG/DL (ref 0.76–1.46)
TSH SERPL DL<=0.005 MIU/L-ACNC: 7.5 MU/L (ref 0.4–4)

## 2018-04-02 PROCEDURE — 84439 ASSAY OF FREE THYROXINE: CPT | Performed by: INTERNAL MEDICINE

## 2018-04-02 PROCEDURE — 84443 ASSAY THYROID STIM HORMONE: CPT | Performed by: INTERNAL MEDICINE

## 2018-04-02 PROCEDURE — 36415 COLL VENOUS BLD VENIPUNCTURE: CPT | Performed by: INTERNAL MEDICINE

## 2018-04-02 NOTE — TELEPHONE ENCOUNTER
TSH order already placed. Patient notified. States will call back to schedule 6 month check with PCP

## 2018-04-02 NOTE — LETTER
[place] [address] [facility]  [address]  [place]  [address]  Tel: [phone]    April 3, 2018    Babita Hartley  [address]  [place] [address]        Dear Ms. Hartley,    This is to inform you regarding your test result.    TSH which is thyroid hormone is elevated   Increase levothyroxine to 125  mcg po daily  Please confirm that you are  on  100 mcg currently so we are increasing to 125  mcg po daily.  Recheck TSH in 2 months.  Orders are placed.  New script is sent      Sincerely,    Staci Evans MD/[facility]          Enclosure: Lab Results  Results for orders placed or performed in visit on 04/02/18   TSH with free T4 reflex   Result Value Ref Range    TSH 7.50 (H) 0.40 - 4.00 mU/L   T4 free   Result Value Ref Range    T4 Free 0.87 0.76 - 1.46 ng/dL

## 2018-04-02 NOTE — TELEPHONE ENCOUNTER
Requested Prescriptions   Pending Prescriptions Disp Refills     zolpidem (AMBIEN) 10 MG tablet 15 tablet 5     Sig: Take 0.5 tablets (5 mg) by mouth nightly as needed for sleep TAKE ONE-HALF TABLET BY MOUTH EVERY NIGHT AT BEDTIME AS NEEDED FOR SLEEP    There is no refill protocol information for this order            Last Written Prescription Date:  10/02/17  Last Fill Quantity: 15 tablet,   # refills: 5  Last Office Visit: 10/02/17 ([facility])  Future Office visit:       Routing refill request to provider for review/approval because:  Drug not on the [facility], P or [facility] refill protocol or controlled substance

## 2018-04-02 NOTE — TELEPHONE ENCOUNTER
Reason for Call: Request for an order or referral:    Order or referral being requested: TSH     Date needed: as soon as possible    Has the patient been seen by the PCP for this problem? YES    Additional comments: none    Phone number Patient can be reached at:  Cell number on file:    Telephone Information:   Mobile [phone]       Best Time:  anytime    Can we leave a detailed message on this number?  YES    Call taken on 4/2/2018 at 9:12 AM by Tanner Nova

## 2018-04-03 DIAGNOSIS — E03.9 HYPOTHYROIDISM, UNSPECIFIED TYPE: ICD-10-CM

## 2018-04-03 RX ORDER — LEVOTHYROXINE SODIUM 125 UG/1
125 TABLET ORAL DAILY
Qty: 90 TABLET | Refills: 0 | Status: SHIPPED | OUTPATIENT
Start: 2018-04-03 | End: 2018-07-20

## 2018-04-03 RX ORDER — ZOLPIDEM TARTRATE 10 MG/1
5 TABLET ORAL
Qty: 15 TABLET | Refills: 1 | Status: SHIPPED | OUTPATIENT
Start: 2018-04-03 | End: 2018-06-05

## 2018-04-03 NOTE — PROGRESS NOTES
Please notify patient by sending following letter with copy of test results      Zahra Jc,    This is to inform you regarding your test result.    TSH which is thyroid hormone is elevated   Increase levothyroxine to 125  mcg po daily  Please confirm that you are  on  100 mcg currently so we are increasing to 125  mcg po daily.  Recheck TSH in 2 months.  Orders are placed.  New script is sent      Sincerely,      Dr.Nasima Nathan MD,FACP

## 2018-04-03 NOTE — TELEPHONE ENCOUNTER
Reason for Call:  Other prescription    Detailed comments: patient is requesting a call back when rx for ambien is faxed to the pharmacy so she can pick it up.     Phone Number Patient can be reached at: Cell number on file:    Telephone Information:   Mobile [phone]       Best Time: any    Can we leave a detailed message on this number? YES    Call taken on 4/3/2018 at 3:00 PM by Evelyn Felix

## 2018-04-03 NOTE — TELEPHONE ENCOUNTER
Faxed Rx for Ambien to [facility] in [place] fax# [phone]  Patient was notified    Carolann WellerCMA

## 2018-04-06 NOTE — TELEPHONE ENCOUNTER
3rd attempt to contact pt.  LM to call and schedule.  Closing encounter as 3 attempt have been made.

## 2018-04-18 ENCOUNTER — OFFICE VISIT (OUTPATIENT)
Dept: DERMATOLOGY | Facility: CLINIC | Age: 33
End: 2018-04-18
Payer: COMMERCIAL

## 2018-04-18 VITALS — OXYGEN SATURATION: 99 % | DIASTOLIC BLOOD PRESSURE: 77 MMHG | SYSTOLIC BLOOD PRESSURE: 116 MMHG | HEART RATE: 73 BPM

## 2018-04-18 DIAGNOSIS — L70.0 ACNE VULGARIS: ICD-10-CM

## 2018-04-18 DIAGNOSIS — Z51.81 MEDICATION MONITORING ENCOUNTER: Primary | ICD-10-CM

## 2018-04-18 DIAGNOSIS — L73.8 SENILE SEBACEOUS GLAND HYPERPLASIA: ICD-10-CM

## 2018-04-18 DIAGNOSIS — L81.0 POST-INFLAMMATORY HYPERPIGMENTATION: ICD-10-CM

## 2018-04-18 PROCEDURE — 99214 OFFICE O/P EST MOD 30 MIN: CPT | Performed by: PHYSICIAN ASSISTANT

## 2018-04-18 RX ORDER — SPIRONOLACTONE 50 MG/1
TABLET, FILM COATED ORAL
Qty: 90 TABLET | Refills: 1 | Status: SHIPPED | OUTPATIENT
Start: 2018-04-18 | End: 2018-10-05

## 2018-04-18 NOTE — NURSING NOTE
"Chief Complaint   Patient presents with     Derm Problem     acne       Initial /77  Pulse 73  LMP 04/04/2018 (Exact Date)  SpO2 99%  Breastfeeding? No Estimated body mass index is 23.71 kg/(m^2) as calculated from the following:    Height as of 7/8/16: 1.613 m (5' 3.5\").    Weight as of 10/2/17: 61.7 kg (136 lb).  Medication Reconciliation: complete  "

## 2018-04-18 NOTE — PATIENT INSTRUCTIONS
Begin Spironolactone 50mg QD  Begin tapering minocycline. Take one tab a day x 1 month, then take one tab QOD x 1 month and then stop.  Continue Differin and Tretinoin nightly.  Continue elta enzyme wash  Will check potassium and kidney function    ACNE INFORMATION     Acne is a skin disease affecting oil glands and hair follicles in your skin.  When these pores do not drain properly, hair follicles can becomes clogged and bacteria can be trapped causing inflammation to occur. Clinical manifestations range from mild to severe, such as comedones (whiteheads and blackheads) or cysts.     Several factors contribute to acne:     1. Hormonal- Androgen (a type of hormone) can cause oil glands to enlarges and produces more sebum (oil).    2. Bacterial- A specific type of bacteria called Propionibacterium acnes are found in these oily follicles and stimulate more inflammation.     3. Genetics- History of family members (parents or siblings)     4. External factors- mechanical trauma, cosmetics, topical steroids or some oral medications.      Combination therapy is the mainstay of treatment given the multiple factors contributing to acne.  The type of treatment is also dependant on whether you are pregnancy or breastfeeding.     TOPICAL TREATMENTS   Topical Antibiotics These medications help prevent growth of bacteria in your pores.   Topical Exfoliating Agent These are drying agents that will help normalize oil production, unplug pores and reduce bacterial growth. (Note: Make sure you discontinue this medication ONE week prior to waxing or your skin may lift.)     ORAL TREATMENTS   Oral Antibiotics: Tetracyclines are the most commonly used oral antibiotics to treat moderate to severe acne. They are safe to take for months at a time. Some side effects to these medications include: sun sensitivity, stomach upset, dizziness and heartburn. If you experience a sudden onset of rash or severe unusual headache, discontinue the  medication and contact your clinician immediately.     Spironolactone: This oral medication blocks androgens (hormones that can aggravate acne).  Since this medication is also used as a diuretic, baseline blood work is needed to check your electrolytes and kidney function. Do not get pregnant while on this medication as it can cause birth defects. Make sure to drink plenty of water.    Birth Control Pills (Females only). In order to decrease hormonal fluctuations that affect acne, birth control pills such as Rosangela  or Patricia  ( and others) can be effective in treating acne.  We advise you to discuss with your OB/GYN or Primary Care Doctor to be screened and to check if you are eligible to be on this pill.     Accutane  (generic: isotretinoin). This oral medication is a retinoid (Vitamin A derivative like Retin-A) used to treat severe acne that does not respond to the above medications. Accutane  can  help clear acne for years and the average period of treatment is five to seven months, however, the duration of treatment may be adjusted based on severity. Some people may need more than one treatment.     ALTERNATIVE TREATMENTS   Microdermabrasion & light chemical peels help improve skin texture, decrease pore size, decrease mild scarring & increase absorption of your topical treatments     Blue Spectrum Light Therapy consists of a concentrated visible light that kills bacteria in the oil ducts. It is safe for pregnancy & nursing. At times, ALA (a topical solution) may be applied prior to the Blue Light exposure in order to enhance light penetration.     Pulse Dye Laser (PDL) decreases inflammation and improves certain acne scars.     Recommended facial cleansers, moisturizers & cosmetics:   Cetaphil  CeraVe  (Note: Look for  non-comeodogenic  cleansers & moisturizers)   Clinique , Prescriptives  make-up

## 2018-04-18 NOTE — PROGRESS NOTES
HPI:  Babita Hartley is a 32 year old year old female patient here today for acne vulgaris   Duration: 15+ years  Symptoms:  New breakouts uncontrolled on face     Previous treatments: Differin, tretinoin, sulfa wash and cream, doxycycline,  minocycline, OCP with minimal improvment    Alleviating/aggravating factors: acne seems to flare around jawline and chin    Associated symptoms: none  Additional findings:none  Patient has no other skin complaints today.  Remainder of the HPI, Meds, PMH, Allergies, FH, and SH was reviewed in chart.      Past Medical History:   Diagnosis Date     Anxiety      ASCUS of cervix with negative high risk HPV 10/02/2017    10/2/17 ASCUS, Neg HPV     Constipation      Ectopic pregnancy 9/26/11     Thyroid disease     hypothyroidism       Past Surgical History:   Procedure Laterality Date     BREAST SURGERY      breast reduction surgery     ECTOPIC PREGNANCY SURGERY       LAPAROSCOPIC SALPINGOTOMY  9/26/11    right for ectopic        Family History   Problem Relation Age of Onset     Hypertension Father      Genitourinary Problems Father      Kidney stones     Lipids Father      Thyroid Disease Father      CEREBROVASCULAR DISEASE Paternal Grandfather      CANCER Paternal Grandmother      Pancreatic cancer     Cardiovascular Maternal Grandfather      Heart attack     Thyroid Disease Mother      Breast Cancer No family hx of      Colon Cancer No family hx of        Social History     Social History     Marital status: Single     Spouse name: N/A     Number of children: N/A     Years of education: N/A     Occupational History     Not on file.     Social History Main Topics     Smoking status: Never Smoker     Smokeless tobacco: Never Used     Alcohol use Yes      Comment: Once a week     Drug use: No     Sexual activity: Yes     Partners: Male     Other Topics Concern     Not on file     Social History Narrative       Outpatient Encounter Prescriptions as of 4/18/2018   Medication Sig  Dispense Refill     citalopram (CELEXA) 20 MG tablet Take 1 tablet (20 mg) by mouth daily 90 tablet 3     fluconazole (DIFLUCAN) 150 MG tablet Take 1 tablet (150 mg) by mouth once as needed 3 tablet 0     levothyroxine (SYNTHROID/LEVOTHROID) 125 MCG tablet Take 1 tablet (125 mcg) by mouth daily 90 tablet 0     norgestim-eth estrad triphasic (TRINESSA, 28,) 0.18/0.215/0.25 MG-35 MCG per tablet Take 1 tablet by mouth daily 84 tablet 3     omeprazole (PRILOSEC) 20 MG CR capsule Take 1 capsule (20 mg) by mouth daily 90 capsule 3     ondansetron (ZOFRAN ODT) 4 MG ODT tab Take 1 tablet (4 mg) by mouth every 6 hours as needed for nausea 20 tablet 1     zolpidem (AMBIEN) 10 MG tablet Take 0.5 tablets (5 mg) by mouth nightly as needed for sleep 15 tablet 1     No facility-administered encounter medications on file as of 4/18/2018.        Review Of Systems:  Skin: As above  Eyes: negative  Ears/Nose/Throat: negative  Respiratory: No shortness of breath, dyspnea on exertion, cough, or hemoptysis  Cardiovascular: negative  Gastrointestinal: negative  Genitourinary: negative  Musculoskeletal: negative  Neurologic: negative  Psychiatric: negative  Hematologic/Lymphatic/Immunologic: negative  Endocrine: negative      Objective:     /77  Pulse 73  LMP 04/04/2018 (Exact Date)  SpO2 99%  Breastfeeding? No  Eyes: Conjunctivae/lids: Normal   ENT: Lips:  Normal  MSK: Normal  Pulm: Breathing Normal  Neuro/Psych: Orientation: Normal; Mood/Affect: Normal, NAD, WDWN  Following areas examined: face, neck, chest  Findings:    1) Multiple inflammatory papules on bilateral jaw line and chin. Light brown/pink smooth macules on face  2) Flesh colored papule/s with yellow lobules and central depression on forehead    Assessment and Plan:  1) Acne vulgaris with PIH   Begin Spironolactone 50mg QD  Begin tapering minocycline. Take one tab a day x 1 month, then take one tab QOD x 1 month and then stop.  Continue Differin and Tretinoin  nightly.  Continue elta enzyme wash once a day and then start BPO wash 2.5-5% at the other end of the day  Will check potassium and kidney function  Drink plenty of water daily to avoid dehydration.  Spironolactone: This oral medication blocks androgens (hormones that can aggravate acne).  Since this medication is also used as a diuretic, baseline blood work is needed to check your electrolytes and kidney function. Do not get pregnant while on this medication. Drink plenty of water to avoid dehydration. May feel lightheaded from medication and may increase urination.   May take 2-3 months to see 50-70% improvement  May get worse during initial phase of treatment    Pathophysiology discussed with patient and information provided.  I discussed with patient oral versus topical treatments such as oral antibiotics, Spironolactone (Aldactone)(women only),oral contraceptive pills (women only) topical creams,  and over-the-counter treatments.     [place] [place] discussed the specifics of tumor, prognosis, and genetics of benign lesions.  I explained that treatment of these lesions would be purely cosmetic and not medically neccessary.  I discussed with patient different removal options including excision, cryotherapy, cautery and /or laser.          Follow up in 2-3 months

## 2018-04-18 NOTE — LETTER
4/18/2018         RE: Babita Hartley  [address]  [place] [address]        Dear Colleague,    Thank you for referring your patient, Babita Hartley, to the [facility]. Please see a copy of my visit note below.    HPI:  Babita Hartley is a 32 year old year old female patient here today for acne vulgaris   Duration: 15+ years  Symptoms:  New breakouts uncontrolled on face     Previous treatments: Differin, tretinoin, sulfa wash and cream, doxycycline,  minocycline, OCP with minimal improvment    Alleviating/aggravating factors: acne seems to flare around jawline and chin    Associated symptoms: none  Additional findings:none  Patient has no other skin complaints today.  Remainder of the HPI, Meds, PMH, Allergies, FH, and SH was reviewed in chart.      Past Medical History:   Diagnosis Date     Anxiety      ASCUS of cervix with negative high risk HPV 10/02/2017    10/2/17 ASCUS, Neg HPV     Constipation      Ectopic pregnancy 9/26/11     Thyroid disease     hypothyroidism       Past Surgical History:   Procedure Laterality Date     BREAST SURGERY      breast reduction surgery     ECTOPIC PREGNANCY SURGERY       LAPAROSCOPIC SALPINGOTOMY  9/26/11    right for ectopic        Family History   Problem Relation Age of Onset     Hypertension Father      Genitourinary Problems Father      Kidney stones     Lipids Father      Thyroid Disease Father      CEREBROVASCULAR DISEASE Paternal Grandfather      CANCER Paternal Grandmother      Pancreatic cancer     Cardiovascular Maternal Grandfather      Heart attack     Thyroid Disease Mother      Breast Cancer No family hx of      Colon Cancer No family hx of        Social History     Social History     Marital status: Single     Spouse name: N/A     Number of children: N/A     Years of education: N/A     Occupational History     Not on file.     Social History Main Topics     Smoking status: Never Smoker     Smokeless tobacco:  Never Used     Alcohol use Yes      Comment: Once a week     Drug use: No     Sexual activity: Yes     Partners: Male     Other Topics Concern     Not on file     Social History Narrative       Outpatient Encounter Prescriptions as of 4/18/2018   Medication Sig Dispense Refill     citalopram (CELEXA) 20 MG tablet Take 1 tablet (20 mg) by mouth daily 90 tablet 3     fluconazole (DIFLUCAN) 150 MG tablet Take 1 tablet (150 mg) by mouth once as needed 3 tablet 0     levothyroxine (SYNTHROID/LEVOTHROID) 125 MCG tablet Take 1 tablet (125 mcg) by mouth daily 90 tablet 0     norgestim-eth estrad triphasic (TRINESSA, 28,) 0.18/0.215/0.25 MG-35 MCG per tablet Take 1 tablet by mouth daily 84 tablet 3     omeprazole (PRILOSEC) 20 MG CR capsule Take 1 capsule (20 mg) by mouth daily 90 capsule 3     ondansetron (ZOFRAN ODT) 4 MG ODT tab Take 1 tablet (4 mg) by mouth every 6 hours as needed for nausea 20 tablet 1     zolpidem (AMBIEN) 10 MG tablet Take 0.5 tablets (5 mg) by mouth nightly as needed for sleep 15 tablet 1     No facility-administered encounter medications on file as of 4/18/2018.        Review Of Systems:  Skin: As above  Eyes: negative  Ears/Nose/Throat: negative  Respiratory: No shortness of breath, dyspnea on exertion, cough, or hemoptysis  Cardiovascular: negative  Gastrointestinal: negative  Genitourinary: negative  Musculoskeletal: negative  Neurologic: negative  Psychiatric: negative  Hematologic/Lymphatic/Immunologic: negative  Endocrine: negative      Objective:     /77  Pulse 73  LMP 04/04/2018 (Exact Date)  SpO2 99%  Breastfeeding? No  Eyes: Conjunctivae/lids: Normal   ENT: Lips:  Normal  MSK: Normal  Pulm: Breathing Normal  Neuro/Psych: Orientation: Normal; Mood/Affect: Normal, NAD, WDWN  Following areas examined: face, neck, chest  Findings:    1) Multiple inflammatory papules on bilateral jaw line and chin. Light brown/pink smooth macules on face  2) Flesh colored papule/s with yellow lobules  and central depression on forehead    Assessment and Plan:  1) Acne vulgaris with PIH   Begin Spironolactone 50mg QD  Begin tapering minocycline. Take one tab a day x 1 month, then take one tab QOD x 1 month and then stop.  Continue Differin and Tretinoin nightly.  Continue elta enzyme wash once a day and then start BPO wash 2.5-5% at the other end of the day  Will check potassium and kidney function  Drink plenty of water daily to avoid dehydration.  Spironolactone: This oral medication blocks androgens (hormones that can aggravate acne).  Since this medication is also used as a diuretic, baseline blood work is needed to check your electrolytes and kidney function. Do not get pregnant while on this medication. Drink plenty of water to avoid dehydration. May feel lightheaded from medication and may increase urination.   May take 2-3 months to see 50-70% improvement  May get worse during initial phase of treatment    Pathophysiology discussed with patient and information provided.  I discussed with patient oral versus topical treatments such as oral antibiotics, Spironolactone (Aldactone)(women only),oral contraceptive pills (women only) topical creams,  and over-the-counter treatments.     [place] [place]  I discussed the specifics of tumor, prognosis, and genetics of benign lesions.  I explained that treatment of these lesions would be purely cosmetic and not medically neccessary.  I discussed with patient different removal options including excision, cryotherapy, cautery and /or laser.          Follow up in 2-3 months      Again, thank you for allowing me to participate in the care of your patient.        Sincerely,        Sasha Cervantes PA-C

## 2018-04-18 NOTE — MR AVS SNAPSHOT
After Visit Summary   4/18/2018    Babita Hartley    MRN: [id number]           Patient Information     Date Of Birth          1985        Visit Information        Provider Department      4/18/2018 4:40 PM Sasha Cervantes PA-C [facility]        Today's Diagnoses     Medication monitoring encounter    -  1      Care Instructions    Begin Spironolactone 50mg QD  Begin tapering minocycline. Take one tab a day x 1 month, then take one tab QOD x 1 month and then stop.  Continue Differin and Tretinoin nightly.  Continue elta enzyme wash  Will check potassium and kidney function    ACNE INFORMATION     Acne is a skin disease affecting oil glands and hair follicles in your skin.  When these pores do not drain properly, hair follicles can becomes clogged and bacteria can be trapped causing inflammation to occur. Clinical manifestations range from mild to severe, such as comedones (whiteheads and blackheads) or cysts.     Several factors contribute to acne:     1. Hormonal- Androgen (a type of hormone) can cause oil glands to enlarges and produces more sebum (oil).    2. Bacterial- A specific type of bacteria called Propionibacterium acnes are found in these oily follicles and stimulate more inflammation.     3. Genetics- History of family members (parents or siblings)     4. External factors- mechanical trauma, cosmetics, topical steroids or some oral medications.      Combination therapy is the mainstay of treatment given the multiple factors contributing to acne.  The type of treatment is also dependant on whether you are pregnancy or breastfeeding.     TOPICAL TREATMENTS   Topical Antibiotics These medications help prevent growth of bacteria in your pores.   Topical Exfoliating Agent These are drying agents that will help normalize oil production, unplug pores and reduce bacterial growth. (Note: Make sure you discontinue this medication ONE week prior to waxing or  your skin may lift.)     ORAL TREATMENTS   Oral Antibiotics: Tetracyclines are the most commonly used oral antibiotics to treat moderate to severe acne. They are safe to take for months at a time. Some side effects to these medications include: sun sensitivity, stomach upset, dizziness and heartburn. If you experience a sudden onset of rash or severe unusual headache, discontinue the medication and contact your clinician immediately.     Spironolactone: This oral medication blocks androgens (hormones that can aggravate acne).  Since this medication is also used as a diuretic, baseline blood work is needed to check your electrolytes and kidney function. Do not get pregnant while on this medication as it can cause birth defects. Make sure to drink plenty of water.    Birth Control Pills (Females only). In order to decrease hormonal fluctuations that affect acne, birth control pills such as Rosangela  or Patricia  ( and others) can be effective in treating acne.  We advise you to discuss with your OB/GYN or Primary Care Doctor to be screened and to check if you are eligible to be on this pill.     Accutane  (generic: isotretinoin). This oral medication is a retinoid (Vitamin A derivative like Retin-A) used to treat severe acne that does not respond to the above medications. Accutane  can  help clear acne for years and the average period of treatment is five to seven months, however, the duration of treatment may be adjusted based on severity. Some people may need more than one treatment.     ALTERNATIVE TREATMENTS   Microdermabrasion & light chemical peels help improve skin texture, decrease pore size, decrease mild scarring & increase absorption of your topical treatments     Blue Spectrum Light Therapy consists of a concentrated visible light that kills bacteria in the oil ducts. It is safe for pregnancy & nursing. At times, ALA (a topical solution) may be applied prior to the Blue Light exposure in order to enhance light  "penetration.     Pulse Dye Laser (PDL) decreases inflammation and improves certain acne scars.     Recommended facial cleansers, moisturizers & cosmetics:   Cetaphil  CeraVe  (Note: Look for  non-comeodogenic  cleansers & moisturizers)   Clinique , Prescriptives  make-up               Follow-ups after your visit        Who to contact     If you have questions or need follow up information about today's clinic visit or your schedule please contact [facility] directly at [phone].  Normal or non-critical lab and imaging results will be communicated to you by MyChart, letter or phone within 4 business days after the clinic has received the results. If you do not hear from us within 7 days, please contact the clinic through [facility] or phone. If you have a critical or abnormal lab result, we will notify you by phone as soon as possible.  Submit refill requests through [facility] or call your pharmacy and they will forward the refill request to us. Please allow 3 business days for your refill to be completed.          Additional Information About Your Visit        [facility] Information     [facility] lets you send messages to your doctor, view your test results, renew your prescriptions, schedule appointments and more. To sign up, go to [email or web address] . Click on \"Log in\" on the left side of the screen, which will take you to the Welcome page. Then click on \"Sign up Now\" on the right side of the page.     You will be asked to enter the access code listed below, as well as some personal information. Please follow the directions to create your username and password.     Your access code is: [email or web address]  Expires: 2018  4:53 PM     Your access code will  in 90 days. If you need help or a new code, please call your [place] clinic or [phone].        Care EveryWhere ID     This is your Care EveryWhere ID. This could be used by other organizations to access your [place] medical " records  [id number]        Your Vitals Were     Pulse Last Period Pulse Oximetry Breastfeeding?          73 04/04/2018 (Exact Date) 99% No         Blood Pressure from Last 3 Encounters:   04/18/18 116/77   10/02/17 108/70   07/08/16 107/64    Weight from Last 3 Encounters:   10/02/17 61.7 kg (136 lb)   07/08/16 62.1 kg (137 lb)   03/03/16 63 kg (139 lb)              We Performed the Following     Creatinine     Potassium        Primary Care Provider Office Phone # Fax #    Staci SADIQ Evans [phone] [address]  [place]                [place] [address]        Equal Access to Services     [facility] : Hadii aad reji Lennon, waaxda qasim, qaybta kaalmada genaroyada, anat thurman . So [place] [phone].    ATENCIÓN: Si habla español, tiene a jacobsen disposición servicios gratuitos de asistencia lingüística. Llame al [phone].    We comply with applicable federal civil rights laws and [place] laws. We do not discriminate on the basis of race, color, national origin, age, disability, sex, sexual orientation, or gender identity.            Thank you!     Thank you for choosing [facility]  for your care. Our goal is always to provide you with excellent care. Hearing back from our patients is one way we can continue to improve our services. Please take a few minutes to complete the written survey that you may receive in the mail after your visit with us. Thank you!             Your Updated Medication List - Protect others around you: Learn how to safely use, store and throw away your medicines at www.disposemymeds.org.          This list is accurate as of 4/18/18  4:53 PM.  Always use your most recent med list.                   Brand Name Dispense Instructions for use Diagnosis    citalopram 20 MG tablet    celeXA    90 tablet    Take 1 tablet (20 mg) by mouth daily    Anxiety       fluconazole 150 MG tablet    DIFLUCAN    3  tablet    Take 1 tablet (150 mg) by mouth once as needed    Yeast infection of the vagina       levothyroxine 125 MCG tablet    SYNTHROID/LEVOTHROID    90 tablet    Take 1 tablet (125 mcg) by mouth daily    Hypothyroidism, unspecified type       norgestim-eth estrad triphasic 0.18/0.215/0.25 MG-35 MCG per tablet    TRINESSA (28)    84 tablet    Take 1 tablet by mouth daily    Encounter for initial prescription of contraceptive pills       omeprazole 20 MG CR capsule    priLOSEC    90 capsule    Take 1 capsule (20 mg) by mouth daily    Throat clearing       ondansetron 4 MG ODT tab    ZOFRAN ODT    20 tablet    Take 1 tablet (4 mg) by mouth every 6 hours as needed for nausea    Nausea       zolpidem 10 MG tablet    AMBIEN    15 tablet    Take 0.5 tablets (5 mg) by mouth nightly as needed for sleep    Primary insomnia

## 2018-04-25 ENCOUNTER — TELEPHONE (OUTPATIENT)
Dept: DERMATOLOGY | Facility: CLINIC | Age: 33
End: 2018-04-25

## 2018-04-25 DIAGNOSIS — Z51.81 MEDICATION MONITORING ENCOUNTER: ICD-10-CM

## 2018-04-25 LAB
CREAT SERPL-MCNC: 0.63 MG/DL (ref 0.52–1.04)
GFR SERPL CREATININE-BSD FRML MDRD: >90 ML/MIN/1.7M2
POTASSIUM SERPL-SCNC: 4.3 MMOL/L (ref 3.4–5.3)

## 2018-04-25 PROCEDURE — 36415 COLL VENOUS BLD VENIPUNCTURE: CPT | Performed by: PHYSICIAN ASSISTANT

## 2018-04-25 PROCEDURE — 82565 ASSAY OF CREATININE: CPT | Performed by: PHYSICIAN ASSISTANT

## 2018-04-25 PROCEDURE — 84132 ASSAY OF SERUM POTASSIUM: CPT | Performed by: PHYSICIAN ASSISTANT

## 2018-04-25 NOTE — TELEPHONE ENCOUNTER
Notes Recorded by Sasha Cervantes PA-C on 4/25/2018 at 4:46 PM  Please call pt and inform labs are wnl. Okay to start spironolactone.

## 2018-05-03 ENCOUNTER — TELEPHONE (OUTPATIENT)
Dept: FAMILY MEDICINE | Facility: CLINIC | Age: 33
End: 2018-05-03

## 2018-05-03 NOTE — TELEPHONE ENCOUNTER
Patient notified that refill was just sent to her pharmacy a few minutes ago and they should be able to fill for her.

## 2018-05-03 NOTE — TELEPHONE ENCOUNTER
Reason for Call:  Medication or medication refill:    Do you use a [facility]?  Name of the pharmacy and phone number for the current request:         [facility] [place], [place] - [address] [place] [address] & [address]      Name of the medication requested:   levothyroxine (SYNTHROID/LEVOTHROID) 125 MCG tablet 90 tablet         Other request: Dosage was changed just recently and pt is currently out of medication    Can we leave a detailed message on this number? YES    Phone number patient can be reached at: Home number on file [phone] (home)    Best Time: Any     Call taken on 5/3/2018 at 11:02 AM by Mami Adan

## 2018-05-14 ENCOUNTER — MYC MEDICAL ADVICE (OUTPATIENT)
Dept: DERMATOLOGY | Facility: CLINIC | Age: 33
End: 2018-05-14

## 2018-05-14 DIAGNOSIS — L70.0 ACNE VULGARIS: Primary | ICD-10-CM

## 2018-05-14 RX ORDER — MINOCYCLINE HYDROCHLORIDE 100 MG/1
CAPSULE ORAL
Qty: 60 CAPSULE | Refills: 2 | Status: SHIPPED | OUTPATIENT
Start: 2018-05-14 | End: 2018-12-31

## 2018-06-05 DIAGNOSIS — F51.01 PRIMARY INSOMNIA: ICD-10-CM

## 2018-06-05 RX ORDER — ZOLPIDEM TARTRATE 10 MG/1
5 TABLET ORAL
Qty: 15 TABLET | Refills: 1 | Status: SHIPPED | OUTPATIENT
Start: 2018-06-05 | End: 2018-07-30

## 2018-06-05 NOTE — TELEPHONE ENCOUNTER
ambien      Last Written Prescription Date:  4/3/18  Last Fill Quantity: 15,   # refills: 1  Last Office Visit: 10/2/17  Future Office visit:       Routing refill request to provider for review/approval because:  Drug not on the [facility], [facility] or [facility] refill protocol or controlled substance

## 2018-07-01 NOTE — TELEPHONE ENCOUNTER
Spoke to pt. She started spironolactone 50mg daily and tapered off on nash. Pt would like to restart nash. Disc starting accutane. Pt defers and would like to continue on nash and spironolactone until the fall at which point she would like to start accutane. Please call pt and inform her that I will send her minocycline.     No

## 2018-07-06 ENCOUNTER — TELEPHONE (OUTPATIENT)
Dept: FAMILY MEDICINE | Facility: CLINIC | Age: 33
End: 2018-07-06

## 2018-07-06 DIAGNOSIS — R11.0 NAUSEA: ICD-10-CM

## 2018-07-06 RX ORDER — ONDANSETRON 4 MG/1
4 TABLET, ORALLY DISINTEGRATING ORAL EVERY 6 HOURS PRN
Qty: 20 TABLET | Refills: 1 | Status: SHIPPED | OUTPATIENT
Start: 2018-07-06 | End: 2019-03-18

## 2018-07-06 NOTE — TELEPHONE ENCOUNTER
Do you think you pulled the wrong chart.  As I have another patient with similar name  This patient has not seen by me .  Dr.Nasima Nathan MD

## 2018-07-06 NOTE — TELEPHONE ENCOUNTER
Babita came into the pharmacy today seeking a refill for zofran tablets. She stated Dr Evans as the prescribing Dr although I did not pull up anything on meds and orders. Feel free to call so we can help her get this rx filled as she is not feeling well.    Thank you,  Emily Dunaway, [facility]  [phone]

## 2018-07-20 DIAGNOSIS — E03.9 HYPOTHYROIDISM, UNSPECIFIED TYPE: ICD-10-CM

## 2018-07-20 RX ORDER — LEVOTHYROXINE SODIUM 125 UG/1
125 TABLET ORAL DAILY
Qty: 30 TABLET | Refills: 0 | Status: SHIPPED | OUTPATIENT
Start: 2018-07-20 | End: 2018-08-03

## 2018-07-20 NOTE — LETTER
[facility]  [address] [facility]  [address]  [place]  [address]  Tel: [phone]    July 25, 2018    Babita Hartley  [address]  [place] [address]        Dear Ms. Hartley,    We have been trying to contact you by phone.  approved a 30 day supply of levothyroxine and sent it to your pharmacy. Please call to schedule a lab only appointment at [phone].  would like to test your thyroid levels to continue to make sure that your dose of levothyroxine continues to be the right dose for you.      If you have any further questions or problems, please contact our office.      Sincerely,    Brianne Smith RN

## 2018-07-20 NOTE — TELEPHONE ENCOUNTER
"Requested Prescriptions   Pending Prescriptions Disp Refills     levothyroxine (SYNTHROID/LEVOTHROID) 125 MCG tablet 90 tablet 0     Sig: Take 1 tablet (125 mcg) by mouth daily    Thyroid Protocol Failed    7/20/2018  8:24 AM       Failed - Normal TSH on file in past 12 months    Recent Labs   Lab Test  04/02/18   1053   TSH  7.50*             Passed - Patient is 12 years or older       Passed - Recent (12 mo) or future (30 days) visit within the authorizing provider's specialty    Patient had office visit in the last 12 months or has a visit in the next 30 days with authorizing provider or within the authorizing provider's specialty.  See \"Patient Info\" tab in inbasket, or \"Choose Columns\" in Meds & Orders section of the refill encounter.           Passed - No active pregnancy on record    If patient is pregnant or has had a positive pregnancy test, please check TSH.         Passed - No positive pregnancy test in past 12 months    If patient is pregnant or has had a positive pregnancy test, please check TSH.          Last Written Prescription Date:  4/3/2018  Last Fill Quantity: 90,  # refills: 0   Last office visit: No previous visit found with prescribing provider:  0   Future Office Visit:      "

## 2018-07-20 NOTE — TELEPHONE ENCOUNTER
Medication is renewed.  Let her know that she is due for TSH testing .  Make lab only appointment   Dr.Nasima Nathan MD

## 2018-07-25 ENCOUNTER — OFFICE VISIT (OUTPATIENT)
Dept: INTERNAL MEDICINE | Facility: CLINIC | Age: 33
End: 2018-07-25
Payer: COMMERCIAL

## 2018-07-25 VITALS
DIASTOLIC BLOOD PRESSURE: 70 MMHG | RESPIRATION RATE: 16 BRPM | WEIGHT: 134 LBS | HEART RATE: 80 BPM | TEMPERATURE: 98 F | BODY MASS INDEX: 22.88 KG/M2 | HEIGHT: 64 IN | SYSTOLIC BLOOD PRESSURE: 110 MMHG

## 2018-07-25 DIAGNOSIS — H69.93 DYSFUNCTION OF BOTH EUSTACHIAN TUBES: ICD-10-CM

## 2018-07-25 DIAGNOSIS — J30.2 CHRONIC SEASONAL ALLERGIC RHINITIS, UNSPECIFIED TRIGGER: Primary | ICD-10-CM

## 2018-07-25 PROCEDURE — 99213 OFFICE O/P EST LOW 20 MIN: CPT | Performed by: PHYSICIAN ASSISTANT

## 2018-07-25 NOTE — PROGRESS NOTES
"  SUBJECTIVE:   Babita Hartley is a 32 year old female who presents to clinic today for the following health issues:      Pt states she has been having a little pain in both ears, and needs her ears cleaned.     Ears feel plugged       Duration: 3 months     Description (location/character/radiation): plugged feeling, and like water in the ears.     No decreased hearing or ear pain     Intensity:  mild, moderate    Accompanying signs and symptoms: no fevers.    Has issues with allergies and has not been taking her zyrtec     History (similar episodes/previous evaluation): None    Precipitating or alleviating factors: None    Therapies tried and outcome: None       Problem list and histories reviewed & adjusted, as indicated.  Additional history: as documented    Labs reviewed in EPIC    Reviewed and updated as needed this visit by clinical staff  Tobacco  Allergies  Meds       Reviewed and updated as needed this visit by Provider  Allergies  Meds         ROS:  Constitutional, HEENT, cardiovascular, pulmonary, gi and gu systems are negative, except as otherwise noted.    OBJECTIVE:     /70 (BP Location: Left arm, Patient Position: Chair, Cuff Size: Adult Regular)  Pulse 80  Temp 98  F (36.7  C) (Oral)  Resp 16  Ht 5' 3.5\" (1.613 m)  Wt 134 lb (60.8 kg)  BMI 23.36 kg/m2  Body mass index is 23.36 kg/(m^2).  GENERAL: healthy, alert and no distress  HENT: normal cephalic/atraumatic, both ears: clear effusion, nose and mouth without ulcers or lesions, rhinorrhea clear, oropharynx clear and oral mucous membranes moist  NECK: no adenopathy, no asymmetry, masses, or scars and thyroid normal to palpation  RESP: lungs clear to auscultation - no rales, rhonchi or wheezes  CV: regular rates and rhythm and normal S1 S2, no S3 or S4  SKIN: no suspicious lesions or rashes    Diagnostic Test Results:  none     ASSESSMENT/PLAN:             1. Chronic seasonal allergic rhinitis, unspecified trigger      2. " Pt called and said she is having trouble with SOA for a few weeks now. She said the last few days ot has gotten worse. Especially when she is lying down. I called to discuss further but got no answer.....Lata     Dysfunction of both eustachian tubes    Reviewed treatment   Use allergy meds and steroid nasal spray  Recheck prn       Patient Instructions   jorge Walker PA-C  [facility]

## 2018-07-25 NOTE — MR AVS SNAPSHOT
"              After Visit Summary   2018    Babita Hartley    MRN: [id number]           Patient Information     Date Of Birth          1985        Visit Information        Provider Department      2018 11:40 AM Lucrecia Walker PA-C [facility]        Today's Diagnoses     Chronic seasonal allergic rhinitis, unspecified trigger    -  1    Dysfunction of both eustachian tubes          Care Instructions    flonase  Zyrtec              Follow-ups after your visit        Who to contact     If you have questions or need follow up information about today's clinic visit or your schedule please contact [facility] directly at [phone].  Normal or non-critical lab and imaging results will be communicated to you by MyChart, letter or phone within 4 business days after the clinic has received the results. If you do not hear from us within 7 days, please contact the clinic through MyChart or phone. If you have a critical or abnormal lab result, we will notify you by phone as soon as possible.  Submit refill requests through b5media or call your pharmacy and they will forward the refill request to us. Please allow 3 business days for your refill to be completed.          Additional Information About Your Visit        MyChart Information     b5media lets you send messages to your doctor, view your test results, renew your prescriptions, schedule appointments and more. To sign up, go to [email or web address] . Click on \"Log in\" on the left side of the screen, which will take you to the Welcome page. Then click on \"Sign up Now\" on the right side of the page.     You will be asked to enter the access code listed below, as well as some personal information. Please follow the directions to create your username and password.     Your access code is: [email or web address]  Expires: 10/23/2018 11:59 AM     Your access code will  in 90 days. If you need help or a " "new code, please call your [facility] or [phone].        Care EveryWhere ID     This is your Care EveryWhere ID. This could be used by other organizations to access your [facility] medical records  [id number]        Your Vitals Were     Pulse Temperature Respirations Height BMI (Body Mass Index)       80 98  F (36.7  C) (Oral) 16 5' 3.5\" (1.613 m) 23.36 kg/m2        Blood Pressure from Last 3 Encounters:   07/25/18 110/70   04/18/18 116/77   10/02/17 108/70    Weight from Last 3 Encounters:   07/25/18 134 lb (60.8 kg)   10/02/17 136 lb (61.7 kg)   07/08/16 137 lb (62.1 kg)              Today, you had the following     No orders found for display       Primary Care Provider Office Phone # Fax #    Staci SADIQ Evans [phone] [address] [facility] [address]  [facility] [address]        Equal Access to Services     [facility]: Hadii aad ku hadasho Saji, waaxda luqadaha, qaybta kaalmada genaroyasamir, anat thurman . So [facility] [phone].    ATENCIÓN: Si habla español, tiene a jacobsen disposición servicios gratuitos de asistencia lingüística. Llame al [phone].    We comply with applicable federal civil rights laws and [place] laws. We do not discriminate on the basis of race, color, national origin, age, disability, sex, sexual orientation, or gender identity.            Thank you!     Thank you for choosing [facility]  for your care. Our goal is always to provide you with excellent care. Hearing back from our patients is one way we can continue to improve our services. Please take a few minutes to complete the written survey that you may receive in the mail after your visit with us. Thank you!             Your Updated Medication List - Protect others around you: Learn how to safely use, store and throw away your medicines at www.disposemymeds.org.          This list is accurate as of 7/25/18 11:59 AM.  Always use your most " recent med list.                   Brand Name Dispense Instructions for use Diagnosis    citalopram 20 MG tablet    celeXA    90 tablet    Take 1 tablet (20 mg) by mouth daily    Anxiety       fluconazole 150 MG tablet    DIFLUCAN    3 tablet    Take 1 tablet (150 mg) by mouth once as needed    Yeast infection of the vagina       levothyroxine 125 MCG tablet    SYNTHROID/LEVOTHROID    30 tablet    Take 1 tablet (125 mcg) by mouth daily Due for TSH ( lab)    Hypothyroidism, unspecified type       minocycline 100 MG capsule    MINOCIN/DYNACIN    60 capsule    Take one in the am and one in the pm    Acne vulgaris       norgestim-eth estrad triphasic 0.18/0.215/0.25 MG-35 MCG per tablet    TRINESSA (28)    84 tablet    Take 1 tablet by mouth daily    Encounter for initial prescription of contraceptive pills       omeprazole 20 MG CR capsule    priLOSEC    90 capsule    Take 1 capsule (20 mg) by mouth daily    Throat clearing       ondansetron 4 MG ODT tab    ZOFRAN ODT    20 tablet    Take 1 tablet (4 mg) by mouth every 6 hours as needed for nausea    Nausea       spironolactone 50 MG tablet    ALDACTONE    90 tablet    Take one pill QD    Medication monitoring encounter, Acne vulgaris       zolpidem 10 MG tablet    AMBIEN    15 tablet    Take 0.5 tablets (5 mg) by mouth nightly as needed for sleep    Primary insomnia

## 2018-07-30 ENCOUNTER — TELEPHONE (OUTPATIENT)
Dept: FAMILY MEDICINE | Facility: CLINIC | Age: 33
End: 2018-07-30

## 2018-07-30 NOTE — TELEPHONE ENCOUNTER
Yes, it was in the fax bin so I gave it  to sae to fax it.  He will do that in next few minutes.  Dr.Nasima Nathan MD

## 2018-07-30 NOTE — TELEPHONE ENCOUNTER
Reason for Call:  Medication or medication refill:    Do you use a [facility]?  Name of the pharmacy and phone number for the current request:      PH[facility]    Name of the medication requested: AMBIEN    Other request: DID NOT GET SENT AT [facility]/ PLEASE RESEND    Can we leave a detailed message on this number? Not Applicable    Phone number patient can be reached at: Other phone number:      Best Time:     Call taken on 7/30/2018 at 2:50 PM by Brandon Valiente

## 2018-08-03 DIAGNOSIS — E03.9 HYPOTHYROIDISM, UNSPECIFIED TYPE: ICD-10-CM

## 2018-08-03 LAB — TSH SERPL DL<=0.005 MIU/L-ACNC: 0.59 MU/L (ref 0.4–4)

## 2018-08-03 PROCEDURE — 84443 ASSAY THYROID STIM HORMONE: CPT | Performed by: INTERNAL MEDICINE

## 2018-08-03 PROCEDURE — 36415 COLL VENOUS BLD VENIPUNCTURE: CPT | Performed by: INTERNAL MEDICINE

## 2018-08-03 RX ORDER — LEVOTHYROXINE SODIUM 125 UG/1
125 TABLET ORAL DAILY
Qty: 90 TABLET | Refills: 1 | Status: SHIPPED | OUTPATIENT
Start: 2018-08-03 | End: 2019-02-21

## 2018-08-03 NOTE — PROGRESS NOTES
Zahra Jc,    This is to inform you regarding your test result.    TSH which is thyroid hormone is normal.  Stay on current dose of levothyroxine.  Recheck TSH in 6 months  Script of levothyroxine is sent to [facility].    Sincerely,      Dr.Nasima Nathan MD,FACP

## 2018-09-26 DIAGNOSIS — F51.01 PRIMARY INSOMNIA: ICD-10-CM

## 2018-09-27 RX ORDER — ZOLPIDEM TARTRATE 10 MG/1
5 TABLET ORAL
Qty: 15 TABLET | Refills: 0 | Status: SHIPPED | OUTPATIENT
Start: 2018-09-27 | End: 2019-03-18

## 2018-09-27 NOTE — TELEPHONE ENCOUNTER
Requested Prescriptions   Pending Prescriptions Disp Refills     zolpidem (AMBIEN) 10 MG tablet        Last Written Prescription Date:  07/30/2018  Last Fill Quantity: 15,   # refills: 01  Last Office Visit: 07/25/2018  Future Office visit:       Routing refill request to provider for review/approval because:  Drug not on the [facility], [facility] or [facility] refill protocol or controlled substance   15 tablet 1     Sig: Take 0.5 tablets (5 mg) by mouth nightly as needed for sleep    There is no refill protocol information for this order

## 2018-09-29 ENCOUNTER — OFFICE VISIT (OUTPATIENT)
Dept: URGENT CARE | Facility: URGENT CARE | Age: 33
End: 2018-09-29
Payer: COMMERCIAL

## 2018-09-29 VITALS — DIASTOLIC BLOOD PRESSURE: 88 MMHG | HEART RATE: 73 BPM | TEMPERATURE: 98.3 F | SYSTOLIC BLOOD PRESSURE: 122 MMHG

## 2018-09-29 DIAGNOSIS — R07.0 THROAT PAIN: Primary | ICD-10-CM

## 2018-09-29 DIAGNOSIS — G47.00 INSOMNIA, UNSPECIFIED TYPE: ICD-10-CM

## 2018-09-29 LAB
DEPRECATED S PYO AG THROAT QL EIA: NORMAL
SPECIMEN SOURCE: NORMAL

## 2018-09-29 PROCEDURE — 87081 CULTURE SCREEN ONLY: CPT | Performed by: PHYSICIAN ASSISTANT

## 2018-09-29 PROCEDURE — 99214 OFFICE O/P EST MOD 30 MIN: CPT | Performed by: PHYSICIAN ASSISTANT

## 2018-09-29 PROCEDURE — 87880 STREP A ASSAY W/OPTIC: CPT | Performed by: PHYSICIAN ASSISTANT

## 2018-09-29 RX ORDER — DIPHENHYDRAMINE HYDROCHLORIDE AND LIDOCAINE HYDROCHLORIDE AND ALUMINUM HYDROXIDE AND MAGNESIUM HYDRO
5-10 KIT EVERY 6 HOURS PRN
Qty: 237 ML | Refills: 1 | Status: SHIPPED | OUTPATIENT
Start: 2018-09-29 | End: 2019-06-27

## 2018-09-29 RX ORDER — ZOLPIDEM TARTRATE 10 MG/1
TABLET ORAL
Qty: 10 TABLET | Refills: 0 | Status: SHIPPED | OUTPATIENT
Start: 2018-09-29 | End: 2018-12-12

## 2018-09-29 NOTE — MR AVS SNAPSHOT
"              After Visit Summary   2018    Babita Hartley    MRN: [id number]           Patient Information     Date Of Birth          1985        Visit Information        Provider Department      2018 11:00 AM Chetna Neri PA-C [facility]        Today's Diagnoses     Throat pain    -  1    Insomnia, unspecified type           Follow-ups after your visit        Who to contact     If you have questions or need follow up information about today's clinic visit or your schedule please contact [facility] directly at [phone].  Normal or non-critical lab and imaging results will be communicated to you by Querylyhart, letter or phone within 4 business days after the clinic has received the results. If you do not hear from us within 7 days, please contact the clinic through Querylyhart or phone. If you have a critical or abnormal lab result, we will notify you by phone as soon as possible.  Submit refill requests through Zivity or call your pharmacy and they will forward the refill request to us. Please allow 3 business days for your refill to be completed.          Additional Information About Your Visit        MyChart Information     Zivity lets you send messages to your doctor, view your test results, renew your prescriptions, schedule appointments and more. To sign up, go to [email or web address] . Click on \"Log in\" on the left side of the screen, which will take you to the Welcome page. Then click on \"Sign up Now\" on the right side of the page.     You will be asked to enter the access code listed below, as well as some personal information. Please follow the directions to create your username and password.     Your access code is: [email or web address]  Expires: 10/23/2018 11:59 AM     Your access code will  in 90 days. If you need help or a new code, please call your [facility] or [phone].        Care EveryWhere ID     This is your " Care EveryWhere ID. This could be used by other organizations to access your [facility] medical records  [id number]        Your Vitals Were     Pulse Temperature                73 98.3  F (36.8  C) (Tympanic)           Blood Pressure from Last 3 Encounters:   09/29/18 122/88   07/25/18 110/70   04/18/18 116/77    Weight from Last 3 Encounters:   07/25/18 134 lb (60.8 kg)   10/02/17 136 lb (61.7 kg)   07/08/16 137 lb (62.1 kg)              We Performed the Following     Beta strep group A culture     Strep, Rapid Screen          Today's Medication Changes          These changes are accurate as of 9/29/18 12:15 PM.  If you have any questions, ask your nurse or doctor.               Start taking these medicines.        Dose/Directions    magic mouthwash suspension (diphenhydrAMINE, lidocaine, aluminum-magnesium & simethicone) Susp compounding kit   Used for:  Throat pain        Dose:  5-10 mL   Swish and spit 5-10 mLs in mouth every 6 hours as needed for mouth sores   Quantity:  237 mL   Refills:  1         These medicines have changed or have updated prescriptions.        Dose/Directions    * zolpidem 10 MG tablet   Commonly known as:  AMBIEN   This may have changed:  Another medication with the same name was added. Make sure you understand how and when to take each.   Used for:  Primary insomnia        Dose:  5 mg   Take 0.5 tablets (5 mg) by mouth nightly as needed for sleep Due for office visit.Call clinic and make appointment   Quantity:  15 tablet   Refills:  0       * zolpidem 10 MG tablet   Commonly known as:  AMBIEN   This may have changed:  You were already taking a medication with the same name, and this prescription was added. Make sure you understand how and when to take each.   Used for:  Insomnia, unspecified type        Take 0.5 tablet at night as needed for sleep.   Quantity:  10 tablet   Refills:  0       * Notice:  This list has 2 medication(s) that are the same as other medications prescribed for  you. Read the directions carefully, and ask your doctor or other care provider to review them with you.         Where to get your medicines      These medications were sent to [facility] [id number] [place] [address] [address]  [address] [place] [id number]     Phone:  [phone]     magic mouthwash suspension (diphenhydrAMINE, lidocaine, aluminum-magnesium & simethicone) Susp compounding kit         Some of these will need a paper prescription and others can be bought over the counter.  Ask your nurse if you have questions.     Bring a paper prescription for each of these medications     zolpidem 10 MG tablet                Primary Care Provider Office Phone # Fax #    Staci Evans [phone] [address] [address]        Equal Access to Services     ELDA GONZALEZ : Hadii everette mendoza hadasho Saji, waaxda luqadaha, qaybta kaalmada genaroyasamir, anat thurman . So [facility] [phone].    ATENCIÓN: Si habla español, tiene a jacobsen disposición servicios gratuitos de asistencia lingüística. Oleksandr al [phone].    We comply with applicable federal civil rights laws and [place] laws. We do not discriminate on the basis of race, color, national origin, age, disability, sex, sexual orientation, or gender identity.            Thank you!     Thank you for choosing [facility]  for your care. Our goal is always to provide you with excellent care. Hearing back from our patients is one way we can continue to improve our services. Please take a few minutes to complete the written survey that you may receive in the mail after your visit with us. Thank you!             Your Updated Medication List - Protect others around you: Learn how to safely use, store and throw away your medicines at www.disposemymeds.org.          This list is accurate as of 9/29/18 12:15 PM.  Always use  your most recent med list.                   Brand Name Dispense Instructions for use Diagnosis    citalopram 20 MG tablet    celeXA    90 tablet    Take 1 tablet (20 mg) by mouth daily    Anxiety       fluconazole 150 MG tablet    DIFLUCAN    3 tablet    Take 1 tablet (150 mg) by mouth once as needed    Yeast infection of the vagina       levothyroxine 125 MCG tablet    SYNTHROID/LEVOTHROID    90 tablet    Take 1 tablet (125 mcg) by mouth daily    Hypothyroidism, unspecified type       magic mouthwash suspension (diphenhydrAMINE, lidocaine, aluminum-magnesium & simethicone) Susp compounding kit     237 mL    Swish and spit 5-10 mLs in mouth every 6 hours as needed for mouth sores    Throat pain       minocycline 100 MG capsule    MINOCIN/DYNACIN    60 capsule    Take one in the am and one in the pm    Acne vulgaris       norgestim-eth estrad triphasic 0.18/0.215/0.25 MG-35 MCG per tablet    TRINESSA (28)    84 tablet    Take 1 tablet by mouth daily    Encounter for initial prescription of contraceptive pills       omeprazole 20 MG CR capsule    priLOSEC    90 capsule    Take 1 capsule (20 mg) by mouth daily    Throat clearing       ondansetron 4 MG ODT tab    ZOFRAN ODT    20 tablet    Take 1 tablet (4 mg) by mouth every 6 hours as needed for nausea    Nausea       spironolactone 50 MG tablet    ALDACTONE    90 tablet    Take one pill QD    Medication monitoring encounter, Acne vulgaris       * zolpidem 10 MG tablet    AMBIEN    15 tablet    Take 0.5 tablets (5 mg) by mouth nightly as needed for sleep Due for office visit.Call clinic and make appointment    Primary insomnia       * zolpidem 10 MG tablet    AMBIEN    10 tablet    Take 0.5 tablet at night as needed for sleep.    Insomnia, unspecified type       * Notice:  This list has 2 medication(s) that are the same as other medications prescribed for you. Read the directions carefully, and ask your doctor or other care provider to review them with you.

## 2018-09-29 NOTE — PROGRESS NOTES
SUBJECTIVE:  Babita Hartley is a 33 year old female with a chief complaint of sore throat.  Onset of symptoms was 1 week(s) ago.    Course of illness: sudden onset, still present and worsening.  Severity moderate  Current and Associated symptoms: no other symptoms. No URI symptoms, n/v/d, headache or rash  Treatment measures tried include Nyquil.  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    Patient also requests a refill of Ambien as she has not been sleeping.   Past Medical History:   Diagnosis Date     Anxiety      ASCUS of cervix with negative high risk HPV 10/02/2017    10/2/17 ASCUS, Neg HPV     Constipation      Ectopic pregnancy 9/26/11     Thyroid disease     hypothyroidism     Current Outpatient Prescriptions   Medication Sig Dispense Refill     citalopram (CELEXA) 20 MG tablet Take 1 tablet (20 mg) by mouth daily 90 tablet 3     fluconazole (DIFLUCAN) 150 MG tablet Take 1 tablet (150 mg) by mouth once as needed 3 tablet 0     levothyroxine (SYNTHROID/LEVOTHROID) 125 MCG tablet Take 1 tablet (125 mcg) by mouth daily 90 tablet 1     magic mouthwash (FIRST-MOUTHWASH BLM) suspension Swish and spit 5-10 mLs in mouth every 6 hours as needed for mouth sores 237 mL 1     minocycline (MINOCIN/DYNACIN) 100 MG capsule Take one in the am and one in the pm 60 capsule 2     norgestim-eth estrad triphasic (TRINESSA, 28,) 0.18/0.215/0.25 MG-35 MCG per tablet Take 1 tablet by mouth daily 84 tablet 3     omeprazole (PRILOSEC) 20 MG CR capsule Take 1 capsule (20 mg) by mouth daily 90 capsule 3     ondansetron (ZOFRAN ODT) 4 MG ODT tab Take 1 tablet (4 mg) by mouth every 6 hours as needed for nausea 20 tablet 1     spironolactone (ALDACTONE) 50 MG tablet Take one pill QD 90 tablet 1     zolpidem (AMBIEN) 10 MG tablet Take 0.5 tablet at night as needed for sleep. 10 tablet 0     zolpidem (AMBIEN) 10 MG tablet Take 0.5 tablets (5 mg) by mouth nightly as needed for sleep Due for office visit.Call clinic and make  appointment 15 tablet 0     Social History   Substance Use Topics     Smoking status: Never Smoker     Smokeless tobacco: Never Used     Alcohol use Yes      Comment: Once a week       ROS:  Review of systems negative except as stated above.    OBJECTIVE:   /88  Pulse 73  Temp 98.3  F (36.8  C) (Tympanic)  GENERAL APPEARANCE: healthy, alert and no distress  EYES: EOMI,  PERRL, conjunctiva clear  HENT: ear canals and TM's normal.  Nose normal.  Pharynx erythematous, mildly  NECK: supple, non-tender to palpation, no adenopathy noted  RESP: lungs clear to auscultation - no rales, rhonchi or wheezes  CV: regular rates and rhythm, normal S1 S2, no murmur noted  SKIN: no suspicious lesions or rashes    Rapid Strep test is negative; await throat culture results.    ASSESSMENT / PLAN:  1. Throat pain  Symptomatic treat with gargles, lozenges, and OTC analgesic as needed. Follow-up with primary clinic if not improving.  Will call if culture positive  - Strep, Rapid Screen  - Beta strep group A culture  - magic mouthwash (FIRST-MOUTHWASH BLM) suspension; Swish and spit 5-10 mLs in mouth every 6 hours as needed for mouth sores  Dispense: 237 mL; Refill: 1    2. Insomnia, unspecified type  Gave supply until she is able to make appt with Dr. Evans.   - zolpidem (AMBIEN) 10 MG tablet; Take 0.5 tablet at night as needed for sleep.  Dispense: 10 tablet; Refill: 0    Chetna Neri PA-C

## 2018-09-30 LAB
BACTERIA SPEC CULT: NORMAL
SPECIMEN SOURCE: NORMAL

## 2018-10-05 ENCOUNTER — TELEPHONE (OUTPATIENT)
Dept: FAMILY MEDICINE | Facility: CLINIC | Age: 33
End: 2018-10-05

## 2018-10-05 DIAGNOSIS — L70.0 ACNE VULGARIS: ICD-10-CM

## 2018-10-05 DIAGNOSIS — Z51.81 MEDICATION MONITORING ENCOUNTER: ICD-10-CM

## 2018-10-05 DIAGNOSIS — Z51.81 ENCOUNTER FOR THERAPEUTIC DRUG MONITORING: ICD-10-CM

## 2018-10-05 DIAGNOSIS — Z51.81 ENCOUNTER FOR THERAPEUTIC DRUG MONITORING: Primary | ICD-10-CM

## 2018-10-05 LAB
CREAT SERPL-MCNC: 0.68 MG/DL (ref 0.52–1.04)
GFR SERPL CREATININE-BSD FRML MDRD: >90 ML/MIN/1.7M2
POTASSIUM SERPL-SCNC: 4 MMOL/L (ref 3.4–5.3)

## 2018-10-05 PROCEDURE — 82565 ASSAY OF CREATININE: CPT | Performed by: PHYSICIAN ASSISTANT

## 2018-10-05 PROCEDURE — 84132 ASSAY OF SERUM POTASSIUM: CPT | Performed by: PHYSICIAN ASSISTANT

## 2018-10-05 PROCEDURE — 36415 COLL VENOUS BLD VENIPUNCTURE: CPT | Performed by: PHYSICIAN ASSISTANT

## 2018-10-05 RX ORDER — SPIRONOLACTONE 100 MG/1
TABLET, FILM COATED ORAL
Qty: 90 TABLET | Refills: 4 | Status: SHIPPED | OUTPATIENT
Start: 2018-10-05 | End: 2019-03-18

## 2018-10-05 NOTE — TELEPHONE ENCOUNTER
Spoke to patient today. Has increased her spironolactone from 50mg daily to 100mg daily x a few months with great improvement. Would like refills and is aware that she needs labs before we can do this. Pt to follow up in one year. Denies any noticeable adverse effects. Has not had any issues with medication. Noticed great improvement of acne.

## 2018-12-12 ENCOUNTER — MYC REFILL (OUTPATIENT)
Dept: FAMILY MEDICINE | Facility: CLINIC | Age: 33
End: 2018-12-12

## 2018-12-12 DIAGNOSIS — B37.31 YEAST INFECTION OF THE VAGINA: ICD-10-CM

## 2018-12-13 NOTE — TELEPHONE ENCOUNTER
"Fluconazole 150 mg    Last Written Prescription Date:  10/13/17  Last Fill Quantity: 3 tablets,  # refills: 0   Last office visit: 10/2/2017 with prescribing provider:  Nathan   Future Office Visit:      Requested Prescriptions   Pending Prescriptions Disp Refills     fluconazole (DIFLUCAN) 150 MG tablet 3 tablet 0     Sig: Take 1 tablet (150 mg) by mouth once as needed    Antifungal Agents Failed - 12/12/2018  8:14 AM       Failed - Recent (12 mo) or future (30 days) visit within the authorizing provider's specialty    Patient had office visit in the last 12 months or has a visit in the next 30 days with authorizing provider or within the authorizing provider's specialty.  See \"Patient Info\" tab in inbasket, or \"Choose Columns\" in Meds & Orders section of the refill encounter.             Failed - Not Fluconazole or Terconazole     If oral Fluconazole or Terconazole, may refill if indicated in progress notes.             "

## 2018-12-14 RX ORDER — FLUCONAZOLE 150 MG/1
150 TABLET ORAL
Qty: 1 TABLET | Refills: 0 | Status: SHIPPED | OUTPATIENT
Start: 2018-12-14 | End: 2019-03-18

## 2018-12-14 NOTE — TELEPHONE ENCOUNTER
Routing refill request to provider for review/approval because:  Patient needs to be seen because it has been more than 1 year since last office visit.    GOLDEN RiversN, RN  [facility]

## 2018-12-31 ENCOUNTER — OFFICE VISIT (OUTPATIENT)
Dept: URGENT CARE | Facility: URGENT CARE | Age: 33
End: 2018-12-31
Payer: COMMERCIAL

## 2018-12-31 VITALS
TEMPERATURE: 97.6 F | SYSTOLIC BLOOD PRESSURE: 108 MMHG | HEART RATE: 70 BPM | DIASTOLIC BLOOD PRESSURE: 80 MMHG | OXYGEN SATURATION: 100 %

## 2018-12-31 DIAGNOSIS — Z30.09 ENCOUNTER FOR COUNSELING REGARDING CONTRACEPTION: Primary | ICD-10-CM

## 2018-12-31 PROCEDURE — 99213 OFFICE O/P EST LOW 20 MIN: CPT | Performed by: PHYSICIAN ASSISTANT

## 2018-12-31 RX ORDER — LEVONORGESTREL 1.5 MG/1
1.5 TABLET ORAL ONCE
Qty: 1 TABLET | Refills: 0 | Status: SHIPPED | OUTPATIENT
Start: 2018-12-31 | End: 2019-03-18

## 2018-12-31 RX ORDER — NORGESTIMATE AND ETHINYL ESTRADIOL 7DAYSX3 28
1 KIT ORAL DAILY
Qty: 84 TABLET | Refills: 0 | Status: SHIPPED | OUTPATIENT
Start: 2018-12-31 | End: 2019-03-18

## 2018-12-31 NOTE — PROGRESS NOTES
CHIEF COMPLAINT:   Chief Complaint   Patient presents with     Medication Request     Pt states medication request      Urgent Care       HPI: Babita Hartley is a 33 year old female who presents to clinic today to discuss recent sexual activity. Patient reports that last night, she had relations with her boyfriend and would like to get Plan B pill. She notes that they used a condom, but she is concerned about getting pregnant. She would like to have IUD placed, but in the meantime would like to start birth control. Denies vaginal symptoms.     Past Medical History:   Diagnosis Date     Anxiety      ASCUS of cervix with negative high risk HPV 10/02/2017    10/2/17 ASCUS, Neg HPV     Constipation      Ectopic pregnancy 9/26/11     Thyroid disease     hypothyroidism     Past Surgical History:   Procedure Laterality Date     BREAST SURGERY      breast reduction surgery     ECTOPIC PREGNANCY SURGERY       LAPAROSCOPIC SALPINGOTOMY  9/26/11    right for ectopic     Social History     Tobacco Use     Smoking status: Never Smoker     Smokeless tobacco: Never Used   Substance Use Topics     Alcohol use: Yes     Comment: Once a week     Current Outpatient Medications   Medication     citalopram (CELEXA) 20 MG tablet     fluconazole (DIFLUCAN) 150 MG tablet     levonorgestrel (PLAN B) 1.5 MG tablet     levothyroxine (SYNTHROID/LEVOTHROID) 125 MCG tablet     magic mouthwash (FIRST-MOUTHWASH BLM) suspension     norgestim-eth estrad triphasic (ORTHO TRI-CYCLEN/TRI-SPRINTEC) 0.18/0.215/0.25 MG-35 MCG tablet     norgestim-eth estrad triphasic (TRINESSA, 28,) 0.18/0.215/0.25 MG-35 MCG per tablet     omeprazole (PRILOSEC) 20 MG CR capsule     ondansetron (ZOFRAN ODT) 4 MG ODT tab     spironolactone (ALDACTONE) 100 MG tablet     zolpidem (AMBIEN) 10 MG tablet     zolpidem (AMBIEN) 10 MG tablet     No current facility-administered medications for this visit.      Allergies   Allergen Reactions     Weed [Grass] Other (See Comments)      Nasal congestion, rhinitis       10 point ROS of systems including Constitutional, Eyes, Respiratory, Cardiovascular, Gastroenterology, Genitourinary, Integumentary, Muscularskeletal, Psychiatric were all negative except for pertinent positives noted in my HPI.        Exam:  /80   Pulse 70   Temp 97.6  F (36.4  C) (Oral)   SpO2 100%   Constitutional: healthy, alert and no distress  Head: Normocephalic, atraumatic.  Skin: no rashes  Neurologic: Speech clear, gait normal. Moves all extremities.      ASSESSMENT/PLAN:  1. Encounter for counseling regarding contraception  Patient requests plan b from encounter from last night. Will write rx for her to take Asap, she did use a condom so I think pregnancy is unlikely.   Also, requests OCP until she can get in with her PCP for IUD placement. Has done well on ortho tri cyclen. She does not use tobacco products. Start taking pill on first day of period.   - levonorgestrel (PLAN B) 1.5 MG tablet; Take 1 tablet (1.5 mg) by mouth once for 1 dose  Dispense: 1 tablet; Refill: 0  - norgestim-eth estrad triphasic (ORTHO TRI-CYCLEN/TRI-SPRINTEC) 0.18/0.215/0.25 MG-35 MCG tablet; Take 1 tablet by mouth daily  Dispense: 84 tablet; Refill: 0    I have discussed the patient's diagnosis and my plan of treatment with the patient. We went over any labs or imaging. Patient is aware to come back in with worsening symptoms or if no relief despite treatment plan.  Patient verbalizes understanding. All questions were addressed and answered.   Chetna Neri PA-C            ]

## 2019-02-20 DIAGNOSIS — E03.9 HYPOTHYROIDISM, UNSPECIFIED TYPE: ICD-10-CM

## 2019-02-21 RX ORDER — LEVOTHYROXINE SODIUM 125 UG/1
125 TABLET ORAL DAILY
Qty: 30 TABLET | Refills: 0 | Status: SHIPPED | OUTPATIENT
Start: 2019-02-21 | End: 2019-03-18

## 2019-02-21 NOTE — TELEPHONE ENCOUNTER
Reason for Call:  Medication or medication refill:    Do you use a [facility]?  Name of the pharmacy and phone number for the current request:  [facility]    Name of the medication requested: SYNTHROID    Other request: Patient states she is out of this medication and would like a refill until her appointment on 3/18/19.    Can we leave a detailed message on this number? YES    Phone number patient can be reached at: Home number on file [phone] (home)    Best Time: anytime     Call taken on 2/20/2019 at 6:18 PM by Winnie Rodriguez

## 2019-02-21 NOTE — TELEPHONE ENCOUNTER
"Requested Prescriptions   Pending Prescriptions Disp Refills     levothyroxine (SYNTHROID/LEVOTHROID) 125 MCG tablet 90 tablet 1     Sig: Take 1 tablet (125 mcg) by mouth daily    Thyroid Protocol Passed - 2/21/2019 10:38 AM       Passed - Patient is 12 years or older       Passed - Recent (12 mo) or future (30 days) visit within the authorizing provider's specialty    Patient had office visit in the last 12 months or has a visit in the next 30 days with authorizing provider or within the authorizing provider's specialty.  See \"Patient Info\" tab in inbasket, or \"Choose Columns\" in Meds & Orders section of the refill encounter.             Passed - Medication is active on med list       Passed - Normal TSH on file in past 12 months    Recent Labs   Lab Test 08/03/18  1247   TSH 0.59             Passed - No active pregnancy on record    If patient is pregnant or has had a positive pregnancy test, please check TSH.         Passed - No positive pregnancy test in past 12 months    If patient is pregnant or has had a positive pregnancy test, please check TSH.          Medication is being filled for 1 time refill only due to:  Patient needs to be seen because due for follow up lab visit.   Next 5 appointments (look out 90 days)    Mar 18, 2019  8:00 AM CDT  [facility] [address] [facility] [id number] [phone]        Camille CARTER RN    "

## 2019-03-11 ENCOUNTER — TELEPHONE (OUTPATIENT)
Dept: FAMILY MEDICINE | Facility: CLINIC | Age: 34
End: 2019-03-11

## 2019-03-11 DIAGNOSIS — G47.00 INSOMNIA, UNSPECIFIED TYPE: ICD-10-CM

## 2019-03-11 RX ORDER — ZOLPIDEM TARTRATE 10 MG/1
TABLET ORAL
Qty: 10 TABLET | Refills: 0 | Status: SHIPPED | OUTPATIENT
Start: 2019-03-11 | End: 2019-03-18

## 2019-03-11 NOTE — TELEPHONE ENCOUNTER
zolpidem (AMBIEN) 10 MG tablet      Last Written Prescription Date:  12/17/18  Last Fill Quantity: 10,   # refills: 0  Last Office Visit: 10/2/17  Future Office visit:    Next 5 appointments (look out 90 days)    Mar 18, 2019  8:00 AM CDT  [facility] MD  [facility] [address] [facility] [address]  [phone]           Routing refill request to provider for review/approval because:  Drug not on the [facility], [facility] or [facility] refill protocol or controlled substance    Please review and authorize if appropriate.    Thank you,   Xavier KIRBY RN

## 2019-03-11 NOTE — TELEPHONE ENCOUNTER
Reason for Call:  Medication or medication refill:    Do you use a [facility]?  Name of the pharmacy and phone number for the current request:       [facility] [address]    Name of the medication requested: zolpidem (AMBIEN) 10 MG tablet    Other request: She's out and the request was sent but attached to the wrong Chart   Duplicate chart  Requested on 7/6    Can we leave a detailed message on this number? YES    Phone number patient can be reached at: Home number on file [phone] (home)    Best Time: anytime    Call taken on 3/11/2019 at 1:49 PM by Maribell Hayes

## 2019-03-18 ENCOUNTER — OFFICE VISIT (OUTPATIENT)
Dept: FAMILY MEDICINE | Facility: CLINIC | Age: 34
End: 2019-03-18
Payer: COMMERCIAL

## 2019-03-18 VITALS
SYSTOLIC BLOOD PRESSURE: 132 MMHG | WEIGHT: 132.9 LBS | HEART RATE: 96 BPM | OXYGEN SATURATION: 99 % | RESPIRATION RATE: 16 BRPM | HEIGHT: 64 IN | BODY MASS INDEX: 22.69 KG/M2 | DIASTOLIC BLOOD PRESSURE: 81 MMHG | TEMPERATURE: 97.9 F

## 2019-03-18 DIAGNOSIS — G47.00 INSOMNIA, UNSPECIFIED TYPE: ICD-10-CM

## 2019-03-18 DIAGNOSIS — Z79.899 MEDICATION MANAGEMENT: ICD-10-CM

## 2019-03-18 DIAGNOSIS — L70.0 ACNE VULGARIS: ICD-10-CM

## 2019-03-18 DIAGNOSIS — R11.0 NAUSEA: ICD-10-CM

## 2019-03-18 DIAGNOSIS — B37.31 YEAST INFECTION OF THE VAGINA: ICD-10-CM

## 2019-03-18 DIAGNOSIS — E03.9 HYPOTHYROIDISM, UNSPECIFIED TYPE: Primary | ICD-10-CM

## 2019-03-18 DIAGNOSIS — E78.2 MIXED HYPERLIPIDEMIA: ICD-10-CM

## 2019-03-18 DIAGNOSIS — Z30.41 ENCOUNTER FOR SURVEILLANCE OF CONTRACEPTIVE PILLS: ICD-10-CM

## 2019-03-18 DIAGNOSIS — F32.0 MILD MAJOR DEPRESSION (H): ICD-10-CM

## 2019-03-18 DIAGNOSIS — Z87.898 HISTORY OF DIFFICULTY IN CONCENTRATING: ICD-10-CM

## 2019-03-18 LAB
ALBUMIN SERPL-MCNC: 3.8 G/DL (ref 3.4–5)
ALP SERPL-CCNC: 43 U/L (ref 40–150)
ALT SERPL W P-5'-P-CCNC: 18 U/L (ref 0–50)
ANION GAP SERPL CALCULATED.3IONS-SCNC: 7 MMOL/L (ref 3–14)
AST SERPL W P-5'-P-CCNC: 13 U/L (ref 0–45)
BILIRUB SERPL-MCNC: 0.3 MG/DL (ref 0.2–1.3)
BUN SERPL-MCNC: 11 MG/DL (ref 7–30)
CALCIUM SERPL-MCNC: 8.8 MG/DL (ref 8.5–10.1)
CHLORIDE SERPL-SCNC: 111 MMOL/L (ref 94–109)
CHOLEST SERPL-MCNC: 196 MG/DL
CO2 SERPL-SCNC: 22 MMOL/L (ref 20–32)
CREAT SERPL-MCNC: 0.76 MG/DL (ref 0.52–1.04)
GFR SERPL CREATININE-BSD FRML MDRD: >90 ML/MIN/{1.73_M2}
GLUCOSE SERPL-MCNC: 93 MG/DL (ref 70–99)
HDLC SERPL-MCNC: 44 MG/DL
LDLC SERPL CALC-MCNC: 122 MG/DL
NONHDLC SERPL-MCNC: 152 MG/DL
POTASSIUM SERPL-SCNC: 4.5 MMOL/L (ref 3.4–5.3)
PROT SERPL-MCNC: 7.2 G/DL (ref 6.8–8.8)
SODIUM SERPL-SCNC: 140 MMOL/L (ref 133–144)
TRIGL SERPL-MCNC: 150 MG/DL
TSH SERPL DL<=0.005 MIU/L-ACNC: 0.82 MU/L (ref 0.4–4)

## 2019-03-18 PROCEDURE — 80061 LIPID PANEL: CPT | Performed by: INTERNAL MEDICINE

## 2019-03-18 PROCEDURE — 84443 ASSAY THYROID STIM HORMONE: CPT | Performed by: INTERNAL MEDICINE

## 2019-03-18 PROCEDURE — 36415 COLL VENOUS BLD VENIPUNCTURE: CPT | Performed by: INTERNAL MEDICINE

## 2019-03-18 PROCEDURE — 99214 OFFICE O/P EST MOD 30 MIN: CPT | Performed by: INTERNAL MEDICINE

## 2019-03-18 PROCEDURE — 80053 COMPREHEN METABOLIC PANEL: CPT | Performed by: INTERNAL MEDICINE

## 2019-03-18 RX ORDER — TRAZODONE HYDROCHLORIDE 50 MG/1
25-50 TABLET, FILM COATED ORAL AT BEDTIME
Qty: 30 TABLET | Refills: 1 | Status: SHIPPED | OUTPATIENT
Start: 2019-03-18 | End: 2019-06-10

## 2019-03-18 RX ORDER — ZOLPIDEM TARTRATE 10 MG/1
5 TABLET ORAL
Qty: 15 TABLET | Refills: 1 | Status: SHIPPED | OUTPATIENT
Start: 2019-03-18 | End: 2019-11-20

## 2019-03-18 RX ORDER — NORGESTIMATE AND ETHINYL ESTRADIOL 7DAYSX3 28
1 KIT ORAL DAILY
Qty: 84 TABLET | Refills: 3 | Status: SHIPPED | OUTPATIENT
Start: 2019-03-18 | End: 2019-12-16

## 2019-03-18 RX ORDER — SPIRONOLACTONE 100 MG/1
100 TABLET, FILM COATED ORAL DAILY
Qty: 90 TABLET | Refills: 4 | Status: SHIPPED | OUTPATIENT
Start: 2019-03-18 | End: 2019-09-26

## 2019-03-18 RX ORDER — LEVOTHYROXINE SODIUM 125 UG/1
125 TABLET ORAL DAILY
Qty: 90 TABLET | Refills: 3 | Status: SHIPPED | OUTPATIENT
Start: 2019-03-18 | End: 2019-09-26

## 2019-03-18 RX ORDER — FLUCONAZOLE 150 MG/1
150 TABLET ORAL
Qty: 3 TABLET | Refills: 1 | Status: SHIPPED | OUTPATIENT
Start: 2019-03-18 | End: 2019-12-16

## 2019-03-18 RX ORDER — ONDANSETRON 4 MG/1
4 TABLET, ORALLY DISINTEGRATING ORAL EVERY 6 HOURS PRN
Qty: 20 TABLET | Refills: 1 | Status: SHIPPED | OUTPATIENT
Start: 2019-03-18 | End: 2019-12-16

## 2019-03-18 ASSESSMENT — MIFFLIN-ST. JEOR: SCORE: 1274.89

## 2019-03-18 ASSESSMENT — PATIENT HEALTH QUESTIONNAIRE - PHQ9: SUM OF ALL RESPONSES TO PHQ QUESTIONS 1-9: 6

## 2019-03-18 NOTE — PATIENT INSTRUCTIONS
Start taking trazodone 50 mg half to one tablet at bed time for sleep  Use Ambien if absolutely needed  Ambien can be habit-forming. It should be taken as prescribed. Do not mix it  with alcohol. Be careful with driving.Do not loose the  Prescription.  Do not overuse this medication. It is a controlled substance.    Labs today  Follow up in 2 months  Make appointment with ob/gyn for mirena IUD  Make appointment with counseling center for evaluation of ADD  Seek sooner medical attention if there is any worsening of symptoms or problems.            Patient Education     Treating Insomnia     Learning to relax before bedtime can improve your sleep.   Good sleeping habits are a key part of treatment. If needed, some medicines may help you sleep better at first. Making healthy lifestyle changes and learning to relax can improve your sleep. Treating insomnia takes commitment. But trust that your efforts will pay off. Be sure to talk with your healthcare provider before taking any medicine.  Healthy lifestyle  Your lifestyle affects your health and your sleep. Here are some healthy habits:    Keep a regular sleep schedule. Go to bed and get up at the same time each day.    Exercise regularly. It may help you reduce stress. Avoid strenuous exercise for 2 to 4 hours before bedtime.    Avoid or limit naps, especially in the late afternoon.    Use your bed only for sleep and sex.    Don t spend too much time in bed trying to fall asleep. If you can t fall asleep, get up and do something (no electronics) until you become tired and drowsy.    Avoid or limit caffeine and nicotine for up to 6 hours before bedtime. They can keep you awake at night.     Also avoid alcohol for at least 4 to 6 hours before bedtime. It may help you fall asleep at first. But you will have more awakenings during the night. And your sleep will not be restful.  Before bedtime  To sleep better every night, try these tips:    Have a bedtime routine to let  your body and mind know when it s time to sleep.    Think of going to bed as relaxing and enjoyable. Sleep will come sooner.    If your worries don t let you sleep, write them down in a diary. Then close it, and go to bed.    Make sure the room is not too hot or too cold. If it s not dark enough, an eye mask can help. If it s noisy, try using earplugs.    Don't eat a large meal just before bedtime.    Remove noises, bright lights, TVs, cell phones, and computers from your sleeping environment.    Use a comfortable mattress and pillow.  Learn to relax  Stress, anxiety, and body tension may keep you awake at night. To unwind before bedtime, try a warm bath, meditation, or yoga. Also try the following:    Deep breathing. Sit or lie back in a chair. Take a slow, deep breath. Hold it for 5 counts. Then breathe out slowly through your mouth. Keep doing this until you feel relaxed.    Progressive muscle relaxation. Tense and then relax the muscles in your body as you breathe deeply. Start with your feet and work up your body to your neck and face.  Cognitive Behavioral Therapy (CBT)  CBT is the most effective treatment for long-term insomnia. It tries to address the underlying causes of your sleep problems, including your habits and how you think about sleep.  Individual Therapy  Vargas Casas PsyD, [place]  Insomnia   [facility]    [facility]: [phone]    [facility]: [phone]  [facility]  Visit www.[place].org or call [phone] to find a clinic close to you.  Suggested resources  The resources below may help you relax. This list is for information only. [facility] is not responsible for the quality of services or the actions of any person or organization. There may be a fee to use some of these resources.  Insomnia treatment books  Gina Mind by Sera Worley and Silvia Slater (2013)  Overcoming Insomnia by Avery  EZIO Jones and Sera Worley (2008)  Quiet Your Mind and Get to Sleep: Solutions to Insomnia for Those with Depression, Anxiety or Chronic Pain by Sera Worley and Silvia Slater (2009)  No More Sleepless Nights by Stepan Ramírez and Annette Olivares (1996)  Say Gina to Insomnia by Tai Garcia (2009)  The Insomnia Workbook by Kamilla Rubalcava and Lit Alejo (2009)  The Insomnia Answer by Eliel Serrano and Tolu Sosa (2006)  Stress management and relaxation books  The Relaxation and Stress Reduction Workbook by Mitzi Carrizales, Miriam Camejo and Yobani Zambrano (2008)  Stress Management Workbook: Techniques and Self-Assessment Procedures by Dorota Fallon and Jayy Meek (1997)  A Mindfulness-Based Stress Reduction Workbook by Magdi Marin and Kaitlynn Alcala (2010)  The Complete Stress Management Workbook by Ruddy Martin, Baldev Price and Caden Keen (1996)  Online programs  www.SHUTi.me (pronounced shut eye). There is a fee for this program.   www.sleepIO.com (pronounced sleep ee oh). There is a fee for this program.  Other online resources  Deep breathing and progressive muscle relaxation (PMR):    http://www.[employer]  Meditation:    www.fragrantheart.[employer]    www.Ciralight GlobalguidedFluentifymeditation-site.[employer]  Guided imagery:    http://www.Boatbound    http://Airizu.[employer]  (click on  Resource Library,  then choose  Meditation, Relaxation, Guided Imagery )  Apps for your mobile device:    Autogenic Training Progressive Muscle Relaxation by [employer]    Calm: Meditation and Sleep Stories by Calm.com, Inc.    Insight Timer - Meditation Joel by [employer].  This is not a prescription and these resources are optional. You must pay for any costs when using these resources. Please ask your insurance carrier if you can be reimbursed for these resources. If so, you are responsible for sending the needed details to your insurance carrier. These  resources may also be tax deductible as medical expenses. Check with your .  Date Last Reviewed: 5/18/2018  Clinically reviewed by Vargas Casas PsyD, LP, [facility], Director of the Insomnia and Behavioral Sleep Health Program, [facility].    9018-2056 [employer]. [address] [place] [address]. All rights reserved. This information is not intended as a substitute for professional medical care. Always follow your healthcare professional's instructions.           Patient Education     What Is Insomnia?    Do you have trouble falling asleep? Do you wake up often during the night? Or maybe you wake up too early in the morning. You may be suffering from insomnia. Talk with your healthcare provider if it lasts longer than a few weeks and you feel tired most of the time.  What causes insomnia?  Some common causes of insomnia are:    Health problems. These may be things such as pain, depression, medicine side effects, or trouble breathing.    Circadian rhythm disorder. This is a shift in the body s normal 24-hour activity cycle.    Lifestyle factors. These can include a changing sleep schedule, lack of exercise, or too much caffeine or alcohol.    Sleep settings. This includes things such as a poor mattress, noise, or a room that s too hot or too cold.    Stress. You may be stressed about problems at work, money worries, or family events.  Talk to your healthcare provider  Describe your sleeping problems to your healthcare provider. Try to keep a daily sleep diary for a couple of weeks. Write down the time you go to bed, the time you wake up, and anything that seems to affect your sleep. Your healthcare provider can work with you to create a treatment plan. You may need to learn good sleeping habits and make some lifestyle changes. If you have any health problems, these may need to be treated first.  Date Last Reviewed: 8/1/2017 2000-2018 [employer] [address]  "[place] [address]. All rights reserved. This information is not intended as a substitute for professional medical care. Always follow your healthcare professional's instructions.           Patient Education   Tips for Sleep Hygiene  \"Sleep hygiene\" means having good sleep habits.Follow these tips to sleep better at night:     Get on a schedule. Go to bed and get up at about the same time every day.    Listen to your body. Only try to sleep when you actually feel tired or sleepy.    Be patient. If you haven't been able to get to sleep after about 30 minutes or more, get up and do something calming or boring until you feel sleepy. Then return to bed and try again.    Don't have caffeine (coffee, tea, cola drinks, chocolate and some medicines), alcohol or nicotine (cigarettes). These can make it harder for you to fall asleep and stay asleep.    Use your bed for sleeping only. That means no TV, computer or homework in bed, especially during the evening and before bedtime.    Don't nap during the day. If you must nap, make sure it is for less than 20 minutes.    Create sleep rituals that remind your body it is time to sleep. Examples include breathing exercises, stretching or reading a book.    Avoid all electronic media (smart phone, computer, tablet) within 2 hours of bed time. The \"blue light\" in these devices activates the part of the brain that keeps you awake.    Dim the lights at night.    Get early morning sources of light (walk in the sunshine) to help set sleep patterns at night.    Try a bath or shower before bed. Having a warm bath 1 to 2 hours before bedtime can help you feel sleepy. Hot baths can make you alert, so be mindful of the temperature.    Don't watch the clock. Checking the clock during the night can wake you up. It can also lead to negative thoughts such as, \"I will never fall asleep,\" which can increase anxiety and sleeplessness.    Use a sleep diary. Track your sleep " schedule to know your sleep patterns and to see where you can improve.    Get regular exercise every day. Try not to do heavy exercise in the 4 hours before bedtime.    Eat a healthy, balanced diet.    Try eating a light, healthy snack before bed, but avoid eating a heavy meal.    Create the right sleeping area. A cool, dark, quiet room is best. If needed, try earplugs, fans and blackout curtains.    Keep your daytime routine the same even if you have a bad night sleep. Avoiding activities the next day can make it harder to sleep.  For informational purposes only. Not to replace the advice of your health care provider.   Copyright   2013 [facility]. All rights reserved. [employer] 978388 - 01/16.

## 2019-03-18 NOTE — PROGRESS NOTES
"  SUBJECTIVE:   Babita Hartley is a 33 year old female who presents to clinic today for the following health issues:      Hypothyroidism Follow-up      Since last visit, patient describes the following symptoms: Weight stable, no hair loss, no skin changes, no constipation, no loose stools      Amount of exercise or physical activity: 2-3 days/week for an average of 45-60 minutes    Problems taking medications regularly: No    Medication side effects: none    Diet: regular (no restrictions)    Patient she was not compliant regarding her appointment due to her work schedule as she works 5 days a week    History of difficulty concentration  She discontinued citalopram  Complaining of insomnia  It runs in family.  Ambien does work for her and she can have a good night sleep  She says her depression symptoms have improved  She is not on any medication  But mostly she has difficulty concentrating  She had difficulty in the college years  She was wondering about the medication    Problem list and histories reviewed & adjusted, as indicated.  Additional history: as documented    Labs reviewed in EPIC    Reviewed and updated as needed this visit by clinical staff  Allergies       Reviewed and updated as needed this visit by Provider         ROS:  Constitutional, neuro, ENT, endocrine, pulmonary, cardiac, gastrointestinal, genitourinary, musculoskeletal, integument and psychiatric systems are negative, except as otherwise noted.  She would like to get something which does not cause weight gain as far as contraception is concerned    OBJECTIVE:                                                    /81 (BP Location: Right arm, Patient Position: Sitting, Cuff Size: Adult Regular)   Pulse 96   Temp 97.9  F (36.6  C) (Tympanic)   Resp 16   Ht 1.613 m (5' 3.5\")   Wt 60.3 kg (132 lb 14.4 oz)   LMP 02/18/2019 (Exact Date)   SpO2 99%   BMI 23.17 kg/m    Body mass index is 23.17 kg/m .  GENERAL APPEARANCE: healthy, " alert and no distress         ASSESSMENT/PLAN:                                                      Babita was seen today for recheck medication.    Diagnoses and all orders for this visit:    Hypothyroidism, unspecified type  -     levothyroxine (SYNTHROID/LEVOTHROID) 125 MCG tablet; Take 1 tablet (125 mcg) by mouth daily  -     TSH with free T4 reflex    Mild major depression (H)  Symptoms have resolved  She has very minimal symptoms    History of difficulty in concentrating  -     MENTAL HEALTH REFERRAL  - Adult; Assessments and Testing; ADHD; [facility]: [facility] [phone]; We will contact you to schedule the appointment or please call with any questions  I told her I cannot put her on medication for ADHD until she has evaluation done and confirms the diagnosis  Referred to the [facility] center for making the appointment  She is in good understanding  She will make the appointment once evaluation is done    Nausea  -     ondansetron (ZOFRAN ODT) 4 MG ODT tab; Take 1 tablet (4 mg) by mouth every 6 hours as needed for nausea  She uses this during the travel    Insomnia, unspecified type  -     zolpidem (AMBIEN) 10 MG tablet; Take 0.5 tablets (5 mg) by mouth nightly as needed for sleep  -     traZODone (DESYREL) 50 MG tablet; Take 0.5-1 tablets (25-50 mg) by mouth At Bedtime  Located about insomnia and its management  Discussed about my concern about use of Ambien  She says she would like to try trazodone and go off of the Ambien  But she would like to have a prescription so in case if trazodone does not work then Ambien can be taken  Her mother also suffers with insomnia  If trazodone does not work I will refer her to the sleep center  She is in agreement    Acne vulgaris  -     spironolactone (ALDACTONE) 100 MG tablet; Take 1 tablet (100 mg) by mouth daily  This works really good for her  She understands this medication requires monitoring of the labs  It affects potassium level    Yeast  infection of the vagina  -     fluconazole (DIFLUCAN) 150 MG tablet; Take 1 tablet (150 mg) by mouth once as needed  And has history of yeast infection  She would like to have prescription on file    Encounter for surveillance of contraceptive pills  -     norgestim-eth estrad triphasic (ORTHO TRI-CYCLEN/TRI-SPRINTEC) 0.18/0.215/0.25 MG-35 MCG tablet; Take 1 tablet by mouth daily  -     OB/GYN REFERRAL  Referred to OB/GYN for considering IUD placement  She will continue to use birth control pill until then  Had Pap smear in 2017 and HPV was negative    Mixed hyperlipidemia  -     Lipid panel reflex to direct LDL Fasting    Medication management  -     Comprehensive metabolic panel          Follow up with Provider -2 months  See Patient Instructions    Staci Evans MD  [facility]

## 2019-03-18 NOTE — RESULT ENCOUNTER NOTE
Zahra Jc,    This is to inform you regarding your test result.    TSH which is thyroid hormone is normal.  Your total cholesterol is normal.  The triglycerides are high. Lowering  the amount of sugar ,alcohol and sweets in the diet helps to control this.Exercise and weight loss helps.  HDL which is called good cholesterol is low.  Your LDL which is called bad cholesterol is elevated.  Eat low cholesterol low fat  diet and do regular physical activity.  Avoid high sugar containing food  The testing of your kidney function, liver function and electrolytes was satisfactory       Sincerely,      Dr.Nasima Nathan MD,FACP

## 2019-03-25 ENCOUNTER — MYC REFILL (OUTPATIENT)
Dept: FAMILY MEDICINE | Facility: CLINIC | Age: 34
End: 2019-03-25

## 2019-03-25 DIAGNOSIS — Z30.41 ENCOUNTER FOR SURVEILLANCE OF CONTRACEPTIVE PILLS: ICD-10-CM

## 2019-03-25 DIAGNOSIS — E03.9 HYPOTHYROIDISM, UNSPECIFIED TYPE: ICD-10-CM

## 2019-03-25 RX ORDER — LEVOTHYROXINE SODIUM 125 UG/1
125 TABLET ORAL DAILY
Qty: 90 TABLET | Refills: 3 | Status: CANCELLED | OUTPATIENT
Start: 2019-03-25

## 2019-03-25 RX ORDER — NORGESTIMATE AND ETHINYL ESTRADIOL 7DAYSX3 28
1 KIT ORAL DAILY
Qty: 84 TABLET | Refills: 3 | Status: CANCELLED | OUTPATIENT
Start: 2019-03-25

## 2019-05-17 ENCOUNTER — TRANSFERRED RECORDS (OUTPATIENT)
Dept: HEALTH INFORMATION MANAGEMENT | Facility: CLINIC | Age: 34
End: 2019-05-17

## 2019-06-10 DIAGNOSIS — G47.00 INSOMNIA, UNSPECIFIED TYPE: ICD-10-CM

## 2019-06-11 NOTE — TELEPHONE ENCOUNTER
"Last Written Prescription Date:  3/18/19  Last Fill Quantity: 30 tablet,  # refills: 1   Last office visit: 3/18/2019 with prescribing provider:  Nathan   Future Office Visit:      Requested Prescriptions   Pending Prescriptions Disp Refills     traZODone (DESYREL) 50 MG tablet [Pharmacy Med Name: TRAZODONE 50MG TABLETS] 30 tablet 0     Sig: TAKE 1/2 TO 1 TABLET(25 TO 50 MG) BY MOUTH AT BEDTIME       Serotonin Modulators Passed - 6/10/2019  5:56 PM        Passed - Recent (12 mo) or future (30 days) visit within the authorizing provider's specialty     Patient had office visit in the last 12 months or has a visit in the next 30 days with authorizing provider or within the authorizing provider's specialty.  See \"Patient Info\" tab in inbasket, or \"Choose Columns\" in Meds & Orders section of the refill encounter.              Passed - Medication is active on med list        Passed - Patient is age 18 or older        Passed - No active pregnancy on record        Passed - No positive pregnancy test in past 12 months          "

## 2019-06-12 ENCOUNTER — MYC MEDICAL ADVICE (OUTPATIENT)
Dept: FAMILY MEDICINE | Facility: CLINIC | Age: 34
End: 2019-06-12

## 2019-06-12 RX ORDER — TRAZODONE HYDROCHLORIDE 50 MG/1
75 TABLET, FILM COATED ORAL AT BEDTIME
Qty: 45 TABLET | Refills: 1 | Status: SHIPPED | OUTPATIENT
Start: 2019-06-12 | End: 2019-06-27

## 2019-06-12 NOTE — TELEPHONE ENCOUNTER
Let the patient know we increased the dose of trazodone from 50 mg to 75 mg daily.  Dr.Nasima Nathan MD

## 2019-06-12 NOTE — TELEPHONE ENCOUNTER
Dr Evans,     Patient MyCharted back stating:     Rodolfo Jose-   It works fairly decent. I was experiencing headaches in the am, but I am no longer having those.   Would it be possible to get a slight increase in the dosage?   thanks   Babita

## 2019-06-24 ENCOUNTER — MYC MEDICAL ADVICE (OUTPATIENT)
Dept: FAMILY MEDICINE | Facility: CLINIC | Age: 34
End: 2019-06-24

## 2019-06-24 ENCOUNTER — MYC REFILL (OUTPATIENT)
Dept: FAMILY MEDICINE | Facility: CLINIC | Age: 34
End: 2019-06-24

## 2019-06-24 DIAGNOSIS — E03.9 HYPOTHYROIDISM, UNSPECIFIED TYPE: ICD-10-CM

## 2019-06-25 NOTE — TELEPHONE ENCOUNTER
PCP see below,   Pt states she is leaving town before your first available OV   Should she wait until she's back to discuss medication?     Please advise   Camille CARTER RN

## 2019-06-25 NOTE — TELEPHONE ENCOUNTER
Dr Evans     See below message from pt/attachment regarding psychological eval for ADHD     Camille CARTER RN

## 2019-06-26 RX ORDER — LEVOTHYROXINE SODIUM 125 UG/1
125 TABLET ORAL DAILY
Qty: 90 TABLET | Refills: 3 | OUTPATIENT
Start: 2019-06-26

## 2019-06-26 NOTE — TELEPHONE ENCOUNTER
Called and scheduled patient for OV with Dr Evans tomorrow, marcella 6-27-19, at 2:30 pm.       Jennifer CUMMINS RN,BSN

## 2019-06-26 NOTE — TELEPHONE ENCOUNTER
"Requested Prescriptions   Pending Prescriptions Disp Refills     levothyroxine (SYNTHROID/LEVOTHROID) 125 MCG tablet 90 tablet 3     Sig: Take 1 tablet (125 mcg) by mouth daily       Thyroid Protocol Passed - 6/24/2019 10:42 PM        Passed - Patient is 12 years or older        Passed - Recent (12 mo) or future (30 days) visit within the authorizing provider's specialty     Patient had office visit in the last 12 months or has a visit in the next 30 days with authorizing provider or within the authorizing provider's specialty.  See \"Patient Info\" tab in inbasket, or \"Choose Columns\" in Meds & Orders section of the refill encounter.              Passed - Medication is active on med list        Passed - Normal TSH on file in past 12 months     Recent Labs   Lab Test 03/18/19  0848   TSH 0.82              Passed - No active pregnancy on record     If patient is pregnant or has had a positive pregnancy test, please check TSH.          Passed - No positive pregnancy test in past 12 months     If patient is pregnant or has had a positive pregnancy test, please check TSH.        1 year Rx sent 3/18/19 - Rx refused. Duplicate/early request. Pharmacy notified.    Camille CARTER RN      "

## 2019-06-26 NOTE — TELEPHONE ENCOUNTER
Please use my reserve slot .  We have one available for this Thursday and Friday.  Dr.Nasima Nathan MD

## 2019-06-27 ENCOUNTER — TELEPHONE (OUTPATIENT)
Dept: FAMILY MEDICINE | Facility: CLINIC | Age: 34
End: 2019-06-27

## 2019-06-27 ENCOUNTER — OFFICE VISIT (OUTPATIENT)
Dept: FAMILY MEDICINE | Facility: CLINIC | Age: 34
End: 2019-06-27
Payer: COMMERCIAL

## 2019-06-27 VITALS
BODY MASS INDEX: 23.03 KG/M2 | TEMPERATURE: 97.3 F | HEART RATE: 66 BPM | WEIGHT: 134.9 LBS | OXYGEN SATURATION: 99 % | SYSTOLIC BLOOD PRESSURE: 120 MMHG | HEIGHT: 64 IN | DIASTOLIC BLOOD PRESSURE: 76 MMHG

## 2019-06-27 DIAGNOSIS — Z79.899 CONTROLLED SUBSTANCE AGREEMENT SIGNED: ICD-10-CM

## 2019-06-27 DIAGNOSIS — G47.00 INSOMNIA, UNSPECIFIED TYPE: ICD-10-CM

## 2019-06-27 DIAGNOSIS — F90.0 ADHD (ATTENTION DEFICIT HYPERACTIVITY DISORDER), INATTENTIVE TYPE: Primary | ICD-10-CM

## 2019-06-27 DIAGNOSIS — M62.838 MUSCLE SPASM: ICD-10-CM

## 2019-06-27 DIAGNOSIS — Z79.899 MEDICATION MANAGEMENT: ICD-10-CM

## 2019-06-27 LAB
AMPHETAMINES UR QL: NOT DETECTED NG/ML
BARBITURATES UR QL SCN: NOT DETECTED NG/ML
BENZODIAZ UR QL SCN: NOT DETECTED NG/ML
BUPRENORPHINE UR QL: NOT DETECTED NG/ML
CANNABINOIDS UR QL: NOT DETECTED NG/ML
COCAINE UR QL SCN: NOT DETECTED NG/ML
D-METHAMPHET UR QL: NOT DETECTED NG/ML
METHADONE UR QL SCN: NOT DETECTED NG/ML
OPIATES UR QL SCN: NOT DETECTED NG/ML
OXYCODONE UR QL SCN: NOT DETECTED NG/ML
PCP UR QL SCN: NOT DETECTED NG/ML
PROPOXYPH UR QL: NOT DETECTED NG/ML
TRICYCLICS UR QL SCN: NOT DETECTED NG/ML

## 2019-06-27 PROCEDURE — 99214 OFFICE O/P EST MOD 30 MIN: CPT | Performed by: INTERNAL MEDICINE

## 2019-06-27 PROCEDURE — 80306 DRUG TEST PRSMV INSTRMNT: CPT | Performed by: INTERNAL MEDICINE

## 2019-06-27 RX ORDER — CYCLOBENZAPRINE HCL 10 MG
5 TABLET ORAL 2 TIMES DAILY PRN
Qty: 10 TABLET | Refills: 1 | Status: SHIPPED | OUTPATIENT
Start: 2019-06-27 | End: 2019-09-26

## 2019-06-27 RX ORDER — DEXTROAMPHETAMINE SACCHARATE, AMPHETAMINE ASPARTATE, DEXTROAMPHETAMINE SULFATE AND AMPHETAMINE SULFATE 1.25; 1.25; 1.25; 1.25 MG/1; MG/1; MG/1; MG/1
5 TABLET ORAL 2 TIMES DAILY
Qty: 60 TABLET | Refills: 0 | Status: SHIPPED | OUTPATIENT
Start: 2019-06-27 | End: 2019-07-16

## 2019-06-27 RX ORDER — TRAZODONE HYDROCHLORIDE 50 MG/1
75 TABLET, FILM COATED ORAL AT BEDTIME
Qty: 45 TABLET | Refills: 3 | Status: SHIPPED | OUTPATIENT
Start: 2019-06-27 | End: 2019-09-26

## 2019-06-27 ASSESSMENT — MIFFLIN-ST. JEOR: SCORE: 1283.96

## 2019-06-27 NOTE — PROGRESS NOTES
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     Ritalin    Injury  Patient had a minor injury while playing tennis a few weeks ago  She requested muscle relaxants to help her sleep    Patient Active Problem List   Diagnosis     CARDIOVASCULAR SCREENING; LDL GOAL LESS THAN 160     Chronic constipation     Insomnia     Anxiety     Mild major depression (H)     Hypothyroidism     ASCUS of cervix with negative high risk HPV     Past Surgical History:   Procedure Laterality Date     BREAST SURGERY      breast reduction surgery     ECTOPIC PREGNANCY SURGERY       LAPAROSCOPIC SALPINGOTOMY  9/26/11    right for ectopic       Social History     Tobacco Use     Smoking status: Never Smoker     Smokeless tobacco: Never Used   Substance Use Topics     Alcohol use: Yes     Comment: Once a week     Family History   Problem Relation Age of Onset     Hypertension Father      Genitourinary Problems Father         Kidney stones     Lipids Father      Thyroid Disease Father      Cerebrovascular Disease Paternal Grandfather      Cancer Paternal Grandmother         Pancreatic cancer     Cardiovascular Maternal Grandfather         Heart attack     Thyroid Disease Mother      Breast Cancer No family hx of      Colon Cancer No family hx of          Current Outpatient Medications   Medication Sig Dispense Refill     amphetamine-dextroamphetamine (ADDERALL) 5 MG tablet Take 1 tablet (5 mg) by mouth 2  times daily 60 tablet 0     cyclobenzaprine (FLEXERIL) 10 MG tablet Take 0.5 tablets (5 mg) by mouth 2 times daily as needed for muscle spasms 10 tablet 1     levothyroxine (SYNTHROID/LEVOTHROID) 125 MCG tablet Take 1 tablet (125 mcg) by mouth daily 90 tablet 3     spironolactone (ALDACTONE) 100 MG tablet Take 1 tablet (100 mg) by mouth daily 90 tablet 4     traZODone (DESYREL) 50 MG tablet Take 1.5 tablets (75 mg) by mouth At Bedtime 45 tablet 3     fluconazole (DIFLUCAN) 150 MG tablet Take 1 tablet (150 mg) by mouth once as needed 3 tablet 1     norgestim-eth estrad triphasic (ORTHO TRI-CYCLEN/TRI-SPRINTEC) 0.18/0.215/0.25 MG-35 MCG tablet Take 1 tablet by mouth daily (Patient not taking: Reported on 6/27/2019) 84 tablet 3     ondansetron (ZOFRAN ODT) 4 MG ODT tab Take 1 tablet (4 mg) by mouth every 6 hours as needed for nausea 20 tablet 1     tretinoin (RETIN-A) 0.025 % cream Spread a pea size amount into affected area topically at bedtime.  Use sunscreen SPF>20. (Patient not taking: Reported on 3/18/2019) 45 g 11     zolpidem (AMBIEN) 10 MG tablet Take 0.5 tablets (5 mg) by mouth nightly as needed for sleep (Patient not taking: Reported on 6/27/2019) 15 tablet 1     Recent Labs   Lab Test 03/18/19  0848 10/05/18  1319 08/03/18  1247  10/02/17  1314  08/13/15  0941  05/17/13  0958   *  --   --   --  124*  --  108  --  116   HDL 44*  --   --   --  54  --  46*  --  51   TRIG 150*  --   --   --  87  --  75  --  74   ALT 18  --   --   --   --   --  29  --  29   CR 0.76 0.68  --    < >  --   --  0.79  --  0.62   GFRESTIMATED >90 >90  --    < >  --   --  84  --  >90   GFRESTBLACK >90 >90  --    < >  --   --  >90  African American GFR Calc    --  >90   POTASSIUM 4.5 4.0  --    < >  --   --  3.9  --  4.2   TSH 0.82  --  0.59   < > 5.10*   < > 3.76   < > 5.01*    < > = values in this interval not displayed.      Reviewed and updated as needed this visit by Provider         Review of Systems   ROS COMP:  "Constitutional, HEENT, cardiovascular, pulmonary, GI, , musculoskeletal, neuro, skin, endocrine and psych systems are negative, except as otherwise noted.    This document serves as a record of the services and decisions personally performed and made by Staci Evans MD. It was created on her behalf by Jeannie Hayes, a trained medical scribe. The creation of this document is based on the provider's statements to the medical scribe.  Jeannie Hayes 2:39 PM June 27, 2019        Objective    /76   Pulse 66   Temp 97.3  F (36.3  C) (Oral)   Ht 1.613 m (5' 3.5\")   Wt 61.2 kg (134 lb 14.4 oz)   SpO2 99%   BMI 23.52 kg/m    Body mass index is 23.52 kg/m .  Physical Exam   GENERAL: healthy, alert and no distress  PSYCH: mentation appears normal, affect normal/bright    Diagnostic Test Results:  Labs reviewed in Epic  No results found for this or any previous visit (from the past 24 hour(s)).        Assessment & Plan     Babita was seen today for medication request.    Diagnoses and all orders for this visit:    ADHD (attention deficit hyperactivity disorder), inattentive type  -     amphetamine-dextroamphetamine (ADDERALL) 5 MG tablet; Take 1 tablet (5 mg) by mouth 2 times daily  -     Drug Abuse Screen Panel 13, Urine (Pain Care Package)  Patient was recently diagnosed with ADHD  She had an evaluation done by [facility]  Copy of report was sent for scanning  She reports feelings of embarrassment about her diagnosis  She had been on Adderall previously in college  It helped her concentrate  Explained that she should try it again  Explained that it's a controlled substance  Controlled substance agreement signed today  Urine specimen was collected  Explained that she has to follow up every 3 months  Discussed the importance of compliance  Chose Aderall as she has taken in the past with success    Medication management  -     Drug Abuse Screen Panel 13, Urine (Pain Care " Package)  See above    Controlled substance agreement signed  See above    Insomnia, unspecified type  -     traZODone (DESYREL) 50 MG tablet; Take 1.5 tablets (75 mg) by mouth At Bedtime  Stable  Works well for her    Muscle spasm  -     cyclobenzaprine (FLEXERIL) 10 MG tablet; Take 0.5 tablets (5 mg) by mouth 2 times daily as needed for muscle spasms  She had an injury to her left shoulder about 2 weeks ago  Requested Flexeril to help her sleep at night  Advised using it only as necessary      Patient Instructions   Urine specimen today  Controlled substance agreement signed today  I refilled your prescriptions  Use 0.5 tablet of Flexeril for muscle spasms sparingly, as needed  Start Adderall twice daily (morning and noon)  Avoid taking it at night as it can cause sleep disturbances  Adderall is a controlled substance. It can be habit-forming. It should be taken as prescribed. Do not mix it with alcohol. Be careful with driving. Do not lose the prescription. Do not overuse this medication.  Follow up in 3 months  Seek sooner medical attention if there is any worsening of symptoms or problems    Return in about 3 months (around 9/27/2019) for medication follow up ADHD, medication follow up.    The information in this document, created by the medical scribe for me, accurately reflects the services I personally performed and the decisions made by me. I have reviewed and approved this document for accuracy prior to leaving the patient care area.  June 27, 2019 2:58 PM    Staci Evans MD  [facility]

## 2019-06-27 NOTE — TELEPHONE ENCOUNTER
Left Voicemail for pt to return call.    Per  - Please have pt call insurance to see which medication is covered for ADHD medication. (Before appt)    Lucero Esparza MA

## 2019-06-27 NOTE — PATIENT INSTRUCTIONS
Urine specimen today  Controlled substance agreement signed today  I refilled your prescriptions  Use 0.5 tablet of Flexeril for muscle spasms sparingly, as needed  Start Adderall twice daily (morning and noon)  Avoid taking it at night as it can cause sleep disturbances  Adderall is a controlled substance. It can be habit-forming. It should be taken as prescribed. Do not mix it with alcohol. Be careful with driving. Do not lose the prescription. Do not overuse this medication.  Follow up in 3 months  Seek sooner medical attention if there is any worsening of symptoms or problems

## 2019-06-27 NOTE — LETTER
[facility]  06/27/19    Patient: Babita Hartley  YOB: 1985  Medical Record Number: [id number]  CSN: [id number]                                                                              Non-opioid Controlled Substance Agreement    I understand that my care provider has prescribed a controlled substance to help manage my condition(s). I am taking this medicine to help me function or work. I know this is strong medicine, and that it can cause serious side effects. Controlled substances can be sedating, addicting and may cause a dependency on the drug. They can affect my ability to drive or think, and cause depression. They need to be taken exactly as prescribed. Combining controlled substances with certain medicines or chemicals (such as cocaine, sedatives and tranquilizers, sleeping pills, meth) can be dangerous or even fatal. Also, if I stop controlled substances suddenly, I may have severe withdrawal symptoms.  If not helpful, I may be asked to stop them.    The risks, benefits, and side effects of these medicine(s) were explained to me. I agree that:    1. I will take part in other treatments as advised by my care team. This may be psychiatry or counseling, physical therapy, behavioral therapy, group treatment or a referral to a pain clinic. I will reduce or stop my medicine when my care team tells me to do so.  2. I will take my medicines as prescribed. I will not change the dose or schedule unless my care team tells me to. There will be no refills if I  run out early.   I may be contactedwithout warning and asked to complete a urine drug test or pill count at any time.   3. I will keep all my appointments, and understand this is part of the monitoring of controlled substances. My care team may require an office visit for EVERY controlled substance refill. If I miss appointments or don t follow instructions, my care team may stop my medicine.  4. I will not ask other providers to  prescribe controlled substances, and I will not accept controlled substances from other people. If I need another prescribed controlled substance for a new reason, I will tell my care team within 1 business day.  5. I will use one pharmacy to fill all of my controlled substance prescriptions, and it is up to me to make sure that I do not run out of my medicines on weekends or holidays. If my care team is willing to refill my controlled substance prescription without a visit, I must request refills only during office hours, refills may take up to 3 days to process, and it may take up to 5 to 7 days for my medicine to be mailed and ready at my pharmacy. Prescriptions will not be mailed anywhere except my pharmacy.    6. I am responsible for my prescriptions. If the medicine/prescription is lost or stolen, it will not be replaced. I also agree not to share controlled substance medicines with anyone.              [facility]  06/27/19  Patient:  Babita Hartley  YOB: 1985  Medical Record Number: [id number]  CSN: [id number]    7. I agree to not use ANY illegal or recreational drugs. This includes marijuana, cocaine, bath salts or other drugs. I agree not to use alcohol unless my care team says I may. I agree to give urine samples whenever asked. If I don t give a urine sample, the care team may stop my medicine.    8. If I enroll in the [place] Medical Marijuana program, I will tell my care team. I will also sign an agreement to share my medical records with my care team.    9. I will bring in my list of medicines (or my medicine bottles) each time I come to the clinic.   10. I will tell my care team right away if I become pregnant or have a new medical problem treated outside of my regular clinic.  11. I understand that this medicine can affect my thinking and judgment. It may be unsafe for me to drive, use machinery and do dangerous tasks. I will not do any of these things until I know how  the medicine affects me. If my dose changes, I will wait to see how it affects me. I will contact my care team if I have concerns about medicine side effects.    I understand that if I do not follow any of the conditions above, my prescriptions or treatment may be stopped.      I agree that my provider, clinic care team, and pharmacy may work with any city, state or federal law enforcement agency that investigates the misuse, sale, or other diversion of my controlled medicine. I will allow my provider to discuss my care with or share a copy of this agreement with any other treating provider, pharmacy or emergency room where I receive care. I agree to give up (waive) any right of privacy or confidentiality with respect to these consents.   I have read this agreement and have asked questions about anything I did not understand.    ____________________________________________________    ____________  ________  Patient signature                                                         Date      Time    ____________________________________________________     ____________  ________  Witness                                                          Date      Time    ____________________________________________________  Provider signature

## 2019-06-28 NOTE — RESULT ENCOUNTER NOTE
Zahra Jc,    This is to inform you regarding your test result.    Urine drug screen is negative.    Sincerely,      Dr.Nasima Nathan MD,FACP

## 2019-07-01 ENCOUNTER — MYC MEDICAL ADVICE (OUTPATIENT)
Dept: FAMILY MEDICINE | Facility: CLINIC | Age: 34
End: 2019-07-01

## 2019-07-08 ENCOUNTER — TELEPHONE (OUTPATIENT)
Dept: FAMILY MEDICINE | Facility: CLINIC | Age: 34
End: 2019-07-08

## 2019-07-08 NOTE — TELEPHONE ENCOUNTER
Prior Authorization Retail Medication Request    Medication/Dose: Adderall 5 mg  ICD code (if different than what is on RX):  F90.0  Previously Tried and Failed:  None  Rationale:  Patient stable on medication    Insurance Name:  [facility] [employer]  Insurance ID:  [id number]      Pharmacy Information (if different than what is on RX)  Name:  [id number]  Phone:  [phone]    CoverMyMeds key: [id number]

## 2019-07-09 NOTE — TELEPHONE ENCOUNTER
PA Initiation    Medication: Adderall 5 mg  Insurance Company: [employer] - Phone [phone] Fax [phone]  Pharmacy Filling the Rx: [facility] [id number] [place] [address] & [address]  Filling Pharmacy Phone: [phone]  Filling Pharmacy Fax:    Start Date: 7/9/2019      Manually faxed records from testing and PA form to [employer]

## 2019-07-09 NOTE — TELEPHONE ENCOUNTER
Prior Authorization Approval    Authorization Effective Date: 7/9/2019  Authorization Expiration Date: 7/8/2020  Medication: Adderall 5 mg - Approved  Approved Dose/Quantity:   Reference #: [id number]   Insurance Company: [employer] - Phone [phone] Fax [phone]  Expected CoPay:       CoPay Card Available: No    Foundation Assistance Needed:    Which Pharmacy is filling the prescription (Not needed for infusion/clinic administered): [facility] [id number] [address] & [place]  Pharmacy Notified: Yes  Patient Notified: Yes

## 2019-07-10 NOTE — TELEPHONE ENCOUNTER
Prior authorization approved on 07/09/19 and [employer] message sent to patient notifying her of this.    Yasir Nash CMA on 7/10/2019 at 10:48 AM

## 2019-07-16 DIAGNOSIS — F90.0 ADHD (ATTENTION DEFICIT HYPERACTIVITY DISORDER), INATTENTIVE TYPE: ICD-10-CM

## 2019-07-16 RX ORDER — DEXTROAMPHETAMINE SACCHARATE, AMPHETAMINE ASPARTATE, DEXTROAMPHETAMINE SULFATE AND AMPHETAMINE SULFATE 2.5; 2.5; 2.5; 2.5 MG/1; MG/1; MG/1; MG/1
10 TABLET ORAL 2 TIMES DAILY
Qty: 60 TABLET | Refills: 0 | Status: SHIPPED | OUTPATIENT
Start: 2019-07-16 | End: 2019-08-26

## 2019-07-29 DIAGNOSIS — R09.89 THROAT CLEARING: ICD-10-CM

## 2019-08-26 ENCOUNTER — MYC MEDICAL ADVICE (OUTPATIENT)
Dept: FAMILY MEDICINE | Facility: CLINIC | Age: 34
End: 2019-08-26

## 2019-08-26 DIAGNOSIS — F90.0 ADHD (ATTENTION DEFICIT HYPERACTIVITY DISORDER), INATTENTIVE TYPE: ICD-10-CM

## 2019-08-26 RX ORDER — DEXTROAMPHETAMINE SACCHARATE, AMPHETAMINE ASPARTATE, DEXTROAMPHETAMINE SULFATE AND AMPHETAMINE SULFATE 2.5; 2.5; 2.5; 2.5 MG/1; MG/1; MG/1; MG/1
TABLET ORAL
Qty: 75 TABLET | Refills: 0 | Status: SHIPPED | OUTPATIENT
Start: 2019-08-26 | End: 2019-09-26

## 2019-08-26 NOTE — TELEPHONE ENCOUNTER
"Dr. Evans see jerryt, Pt asking for increase in Adderall. Per our discussion pended for 15mg AM, with 10mg PRN - added \"by 2pm\" for the PRN.      Spoke to pt on phone, explained plan and scheduled for an OV at 4pm on 9/26/19, so should have enough with this refill to get through to that date.       Pending Prescriptions:                       Disp   Refills    amphetamine-dextroamphetamine (ADDERALL) *60 tab*0            Sig: Take 1.5 tablets (15 mg) by mouth every morning.           May also take 1 tablet (10 mg) daily as needed           (Attention). Take by 2pm  Last filled 10mg bid on 7/16/19    Sanjuana Mullen RN    "

## 2019-09-26 ENCOUNTER — OFFICE VISIT (OUTPATIENT)
Dept: FAMILY MEDICINE | Facility: CLINIC | Age: 34
End: 2019-09-26
Payer: COMMERCIAL

## 2019-09-26 VITALS
TEMPERATURE: 98.1 F | OXYGEN SATURATION: 100 % | WEIGHT: 127 LBS | SYSTOLIC BLOOD PRESSURE: 123 MMHG | HEART RATE: 74 BPM | BODY MASS INDEX: 21.68 KG/M2 | DIASTOLIC BLOOD PRESSURE: 87 MMHG | HEIGHT: 64 IN

## 2019-09-26 DIAGNOSIS — R09.82 POST-NASAL DRAINAGE: ICD-10-CM

## 2019-09-26 DIAGNOSIS — Z13.220 LIPID SCREENING: ICD-10-CM

## 2019-09-26 DIAGNOSIS — Z11.3 SCREEN FOR STD (SEXUALLY TRANSMITTED DISEASE): Primary | ICD-10-CM

## 2019-09-26 DIAGNOSIS — Z13.0 SCREENING FOR DEFICIENCY ANEMIA: ICD-10-CM

## 2019-09-26 DIAGNOSIS — F90.0 ADHD (ATTENTION DEFICIT HYPERACTIVITY DISORDER), INATTENTIVE TYPE: Primary | ICD-10-CM

## 2019-09-26 DIAGNOSIS — F90.0 ADHD (ATTENTION DEFICIT HYPERACTIVITY DISORDER), INATTENTIVE TYPE: ICD-10-CM

## 2019-09-26 DIAGNOSIS — R09.89 THROAT CLEARING: ICD-10-CM

## 2019-09-26 DIAGNOSIS — Z79.899 MEDICATION MANAGEMENT: ICD-10-CM

## 2019-09-26 DIAGNOSIS — E03.9 HYPOTHYROIDISM, UNSPECIFIED TYPE: ICD-10-CM

## 2019-09-26 DIAGNOSIS — M62.838 MUSCLE SPASM: ICD-10-CM

## 2019-09-26 DIAGNOSIS — G47.00 INSOMNIA, UNSPECIFIED TYPE: ICD-10-CM

## 2019-09-26 DIAGNOSIS — L70.0 ACNE VULGARIS: ICD-10-CM

## 2019-09-26 LAB
ERYTHROCYTE [DISTWIDTH] IN BLOOD BY AUTOMATED COUNT: 11.7 % (ref 10–15)
HCT VFR BLD AUTO: 39.7 % (ref 35–47)
HGB BLD-MCNC: 14.1 G/DL (ref 11.7–15.7)
MCH RBC QN AUTO: 32.6 PG (ref 26.5–33)
MCHC RBC AUTO-ENTMCNC: 35.5 G/DL (ref 31.5–36.5)
MCV RBC AUTO: 92 FL (ref 78–100)
PLATELET # BLD AUTO: 327 10E9/L (ref 150–450)
RBC # BLD AUTO: 4.32 10E12/L (ref 3.8–5.2)
WBC # BLD AUTO: 7.7 10E9/L (ref 4–11)

## 2019-09-26 PROCEDURE — 87491 CHLMYD TRACH DNA AMP PROBE: CPT | Performed by: INTERNAL MEDICINE

## 2019-09-26 PROCEDURE — 85027 COMPLETE CBC AUTOMATED: CPT | Performed by: INTERNAL MEDICINE

## 2019-09-26 PROCEDURE — 99214 OFFICE O/P EST MOD 30 MIN: CPT | Performed by: INTERNAL MEDICINE

## 2019-09-26 PROCEDURE — 80053 COMPREHEN METABOLIC PANEL: CPT | Performed by: INTERNAL MEDICINE

## 2019-09-26 PROCEDURE — 80061 LIPID PANEL: CPT | Performed by: INTERNAL MEDICINE

## 2019-09-26 PROCEDURE — 87591 N.GONORRHOEAE DNA AMP PROB: CPT | Performed by: INTERNAL MEDICINE

## 2019-09-26 PROCEDURE — 84443 ASSAY THYROID STIM HORMONE: CPT | Performed by: INTERNAL MEDICINE

## 2019-09-26 PROCEDURE — 87389 HIV-1 AG W/HIV-1&-2 AB AG IA: CPT | Performed by: INTERNAL MEDICINE

## 2019-09-26 PROCEDURE — 36415 COLL VENOUS BLD VENIPUNCTURE: CPT | Performed by: INTERNAL MEDICINE

## 2019-09-26 RX ORDER — DEXTROAMPHETAMINE SACCHARATE, AMPHETAMINE ASPARTATE, DEXTROAMPHETAMINE SULFATE AND AMPHETAMINE SULFATE 3.75; 3.75; 3.75; 3.75 MG/1; MG/1; MG/1; MG/1
15 TABLET ORAL 2 TIMES DAILY
Qty: 60 TABLET | Refills: 0 | Status: SHIPPED | OUTPATIENT
Start: 2019-10-25 | End: 2019-11-12

## 2019-09-26 RX ORDER — DEXTROAMPHETAMINE SACCHARATE, AMPHETAMINE ASPARTATE, DEXTROAMPHETAMINE SULFATE AND AMPHETAMINE SULFATE 3.75; 3.75; 3.75; 3.75 MG/1; MG/1; MG/1; MG/1
15 TABLET ORAL EVERY MORNING
Qty: 60 TABLET | Refills: 0 | Status: SHIPPED | OUTPATIENT
Start: 2019-09-26 | End: 2019-11-12 | Stop reason: DRUGHIGH

## 2019-09-26 RX ORDER — CETIRIZINE HYDROCHLORIDE 10 MG/1
10 TABLET ORAL DAILY
Qty: 30 TABLET | Refills: 3 | Status: SHIPPED | OUTPATIENT
Start: 2019-09-26 | End: 2019-12-16

## 2019-09-26 RX ORDER — CYCLOBENZAPRINE HCL 10 MG
5 TABLET ORAL 2 TIMES DAILY PRN
Qty: 10 TABLET | Refills: 1 | Status: SHIPPED | OUTPATIENT
Start: 2019-09-26 | End: 2019-12-04

## 2019-09-26 RX ORDER — SPIRONOLACTONE 100 MG/1
100 TABLET, FILM COATED ORAL DAILY
Qty: 90 TABLET | Refills: 4 | Status: SHIPPED | OUTPATIENT
Start: 2019-09-26 | End: 2019-12-16

## 2019-09-26 RX ORDER — LEVOTHYROXINE SODIUM 125 UG/1
125 TABLET ORAL DAILY
Qty: 90 TABLET | Refills: 1 | Status: SHIPPED | OUTPATIENT
Start: 2019-09-26 | End: 2020-01-28

## 2019-09-26 RX ORDER — TRAZODONE HYDROCHLORIDE 50 MG/1
75 TABLET, FILM COATED ORAL AT BEDTIME
Qty: 45 TABLET | Refills: 3 | Status: SHIPPED | OUTPATIENT
Start: 2019-09-26 | End: 2020-02-10

## 2019-09-26 ASSESSMENT — MIFFLIN-ST. JEOR: SCORE: 1243.13

## 2019-09-26 NOTE — PROGRESS NOTES
"Subjective     Babita Hartley is a 34 year old female who presents to clinic today for the following health issues:    HPI     ADHD   Patient is here for Adderall recheck  She is sad she ever stopped this medication  Has noticed a huge difference in her quality of life since restarting  Endorses she is sleeping better, has more energy, and focus is improved  Before she was struggling to focus  Tried to push through  But this was exhausting for her  Was taking 15mg Adderall and 10mg in afternoon  Did not feel much with 10mg  Taking 15mg in morning and 15mg in evening  Feels this helps her better  Would like to change to this dose    Frequent throat clearing  Patient complains of frequent throat clearing  Has to swallow all day  This has been bothersome for last couple days  This is somewhat embarrassing for her  Wonders what causes this  Taking Zyrtec every morning  Does not think she drinks enough fluids    History of ASCUS  Last PAP on 10/02/2017 showed ASCUS  HPV was negative    Medications and Labs reviewed in EPIC    Reviewed and updated as needed this visit by Provider         Review of Systems   ROS COMP: Constitutional, HEENT, cardiovascular, pulmonary, GI, , musculoskeletal, neuro, skin, endocrine and psych systems are negative, except as otherwise noted.    POSITIVE for frequent throat clearing    This document serves as a record of the services and decisions personally performed and made by Staci Evans MD. It was created on her behalf by Eve Benson, a trained medical scribe. The creation of this document is based on the provider's statements to the medical scribe.  Eve Benson 3:50 PM September 26, 2019        Objective    /87   Pulse 74   Temp 98.1  F (36.7  C) (Oral)   Ht 1.613 m (5' 3.5\")   Wt 57.6 kg (127 lb)   SpO2 100%   BMI 22.14 kg/m    Body mass index is 22.14 kg/m .  Physical Exam   GENERAL: healthy, alert and no distress  PSYCH: mentation appears normal, affect " normal/bright    Diagnostic Test Results:  No results found for this or any previous visit (from the past 24 hour(s)).        Assessment & Plan   Babita was seen today for recheck medication.    Diagnoses and all orders for this visit:    Acne vulgaris  This works good for her  Aware this needs monitoring with labs  Will recheck labs today  -     spironolactone (ALDACTONE) 100 MG tablet; Take 1 tablet (100 mg) by mouth daily    Hypothyroidism, unspecified type  Stable  Compliant with medication  Do not fill levothyroxine prescription until labs are received  May change dose based on labs  Will inform patient of results once received  -     levothyroxine (SYNTHROID/LEVOTHROID) 125 MCG tablet; Take 1 tablet (125 mcg) by mouth daily    Insomnia, unspecified type  Stable  Compliant with medication  Trazodone works well for her  -     traZODone (DESYREL) 50 MG tablet; Take 1.5 tablets (75 mg) by mouth At Bedtime    Muscle spasm  She had left shoulder injury in June 2019  Taking Flexeril to help her sleep at night  Working well for her  -     cyclobenzaprine (FLEXERIL) 10 MG tablet; Take 0.5 tablets (5 mg) by mouth 2 times daily as needed for muscle spasms     ADHD:    Okay to take 15mg Adderall twice daily  Will need to get input of psychiatry about dosing if require higher than this    Recommended to take OTC Zyrtec and Zantac for throat clearing    Patient Instructions   Labs today  I refilled your prescriptions  Okay to take 15mg Adderall in morning and 15mg in afternoon  Adderall is a controlled substance. It can be habit-forming. It should be taken as prescribed. Do not mix it with alcohol. Be careful with driving. Do not lose the prescription. Do not overuse this medication.    Take 150mg Zantac brand name for acid reflux/throat clearing  Take 10mg Zyrtec every morning  Drink plenty of fluids    Follow-up in 2 months  Seek sooner medical attention if there is any worsening of symptoms or problems    The information  in this document, created by the medical scribe for me, accurately reflects the services I personally performed and the decisions made by me. I have reviewed and approved this document for accuracy prior to leaving the patient care area.  September 26, 2019 4:10 PM    Staci Evans MD  [facility]

## 2019-09-26 NOTE — PATIENT INSTRUCTIONS
Labs today  I refilled your prescriptions  Okay to take 15mg Adderall in morning and 15mg in afternoon  Adderall is a controlled substance. It can be habit-forming. It should be taken as prescribed. Do not mix it with alcohol. Be careful with driving. Do not lose the prescription. Do not overuse this medication.    Take 150mg Zantac brand name for acid reflux/throat clearing  Take 10mg Zyrtec every morning  Drink plenty of fluids    Follow-up in 2 months  Seek sooner medical attention if there is any worsening of symptoms or problems

## 2019-09-27 LAB
ALBUMIN SERPL-MCNC: 4.4 G/DL (ref 3.4–5)
ALP SERPL-CCNC: 53 U/L (ref 40–150)
ALT SERPL W P-5'-P-CCNC: 32 U/L (ref 0–50)
ANION GAP SERPL CALCULATED.3IONS-SCNC: 6 MMOL/L (ref 3–14)
AST SERPL W P-5'-P-CCNC: 11 U/L (ref 0–45)
BILIRUB SERPL-MCNC: 0.4 MG/DL (ref 0.2–1.3)
BUN SERPL-MCNC: 15 MG/DL (ref 7–30)
CALCIUM SERPL-MCNC: 9 MG/DL (ref 8.5–10.1)
CHLORIDE SERPL-SCNC: 106 MMOL/L (ref 94–109)
CHOLEST SERPL-MCNC: 173 MG/DL
CO2 SERPL-SCNC: 25 MMOL/L (ref 20–32)
CREAT SERPL-MCNC: 0.77 MG/DL (ref 0.52–1.04)
GFR SERPL CREATININE-BSD FRML MDRD: >90 ML/MIN/{1.73_M2}
GLUCOSE SERPL-MCNC: 81 MG/DL (ref 70–99)
HDLC SERPL-MCNC: 35 MG/DL
LDLC SERPL CALC-MCNC: 123 MG/DL
NONHDLC SERPL-MCNC: 138 MG/DL
POTASSIUM SERPL-SCNC: 4 MMOL/L (ref 3.4–5.3)
PROT SERPL-MCNC: 7.5 G/DL (ref 6.8–8.8)
SODIUM SERPL-SCNC: 137 MMOL/L (ref 133–144)
TRIGL SERPL-MCNC: 77 MG/DL
TSH SERPL DL<=0.005 MIU/L-ACNC: 0.5 MU/L (ref 0.4–4)

## 2019-09-29 LAB
C TRACH DNA SPEC QL NAA+PROBE: NEGATIVE
N GONORRHOEA DNA SPEC QL NAA+PROBE: NEGATIVE
SPECIMEN SOURCE: NORMAL
SPECIMEN SOURCE: NORMAL

## 2019-09-30 LAB — HIV 1+2 AB+HIV1 P24 AG SERPL QL IA: NONREACTIVE

## 2019-09-30 NOTE — RESULT ENCOUNTER NOTE
Zahra Jc,    This is to inform you regarding your test result.    GC and chlamydia test is negative.    Sincerely,      Dr.Nasima Nathan MD,FACP

## 2019-09-30 NOTE — RESULT ENCOUNTER NOTE
Zahra Jc,    This is to inform you regarding your test result.    HIV 1&2 Antibody is negative.      Sincerely,      Dr.Nasima Nathan MD,FACP

## 2019-11-08 ENCOUNTER — HEALTH MAINTENANCE LETTER (OUTPATIENT)
Age: 34
End: 2019-11-08

## 2019-12-04 ENCOUNTER — APPOINTMENT (OUTPATIENT)
Dept: GENERAL RADIOLOGY | Facility: CLINIC | Age: 34
End: 2019-12-04
Attending: EMERGENCY MEDICINE
Payer: COMMERCIAL

## 2019-12-04 ENCOUNTER — HOSPITAL ENCOUNTER (EMERGENCY)
Facility: CLINIC | Age: 34
Discharge: HOME | End: 2019-12-04
Attending: EMERGENCY MEDICINE | Admitting: EMERGENCY MEDICINE
Payer: COMMERCIAL

## 2019-12-04 VITALS
DIASTOLIC BLOOD PRESSURE: 80 MMHG | HEIGHT: 64 IN | RESPIRATION RATE: 14 BRPM | SYSTOLIC BLOOD PRESSURE: 134 MMHG | TEMPERATURE: 98.2 F | WEIGHT: 120 LBS | BODY MASS INDEX: 20.49 KG/M2 | OXYGEN SATURATION: 100 %

## 2019-12-04 DIAGNOSIS — M54.12 CERVICAL RADICULOPATHY: ICD-10-CM

## 2019-12-04 PROCEDURE — 72040 X-RAY EXAM NECK SPINE 2-3 VW: CPT

## 2019-12-04 PROCEDURE — 25000132 ZZH RX MED GY IP 250 OP 250 PS 637: Performed by: EMERGENCY MEDICINE

## 2019-12-04 PROCEDURE — 99283 EMERGENCY DEPT VISIT LOW MDM: CPT

## 2019-12-04 RX ORDER — CYCLOBENZAPRINE HCL 5 MG
5-10 TABLET ORAL 3 TIMES DAILY PRN
Qty: 15 TABLET | Refills: 0 | Status: SHIPPED | OUTPATIENT
Start: 2019-12-04 | End: 2019-12-16

## 2019-12-04 RX ORDER — METHYLPREDNISOLONE 4 MG
TABLET, DOSE PACK ORAL
Qty: 21 TABLET | Refills: 0 | Status: SHIPPED | OUTPATIENT
Start: 2019-12-04 | End: 2019-12-16

## 2019-12-04 RX ORDER — OXYCODONE HYDROCHLORIDE 5 MG/1
5-10 TABLET ORAL EVERY 6 HOURS PRN
Qty: 20 TABLET | Refills: 0 | Status: SHIPPED | OUTPATIENT
Start: 2019-12-04 | End: 2019-12-16

## 2019-12-04 RX ORDER — ACETAMINOPHEN 500 MG
1000 TABLET ORAL ONCE
Status: COMPLETED | OUTPATIENT
Start: 2019-12-04 | End: 2019-12-04

## 2019-12-04 RX ADMIN — ACETAMINOPHEN 1000 MG: 500 TABLET, FILM COATED ORAL at 18:32

## 2019-12-04 ASSESSMENT — MIFFLIN-ST. JEOR: SCORE: 1219.32

## 2019-12-04 ASSESSMENT — ENCOUNTER SYMPTOMS
FEVER: 0
MYALGIAS: 1
NECK PAIN: 1
HEADACHES: 0
NUMBNESS: 1
SHORTNESS OF BREATH: 0
ARTHRALGIAS: 1
DIZZINESS: 1

## 2019-12-04 NOTE — ED AVS SNAPSHOT
Emergency Department  [address] [place] [id number]  Phone:  [phone]  Fax:  [phone]                                    Babita Hartley   MRN: [id number]    Department:   Emergency Department   Date of Visit:  12/4/2019           After Visit Summary Signature Page    I have received my discharge instructions, and my questions have been answered. I have discussed any challenges I see with this plan with the nurse or doctor.    ..........................................................................................................................................  Patient/Patient Representative Signature      ..........................................................................................................................................  Patient Representative Print Name and Relationship to Patient    ..................................................               ................................................  Date                                   Time    ..........................................................................................................................................  Reviewed by Signature/Title    ...................................................              ..............................................  Date                                               Time          22EPIC Rev 08/18

## 2019-12-04 NOTE — LETTER
December 4, 2019      To Whom It May Concern:      Babita Hartley was seen in our Emergency Department today, 12/04/19.  I expect her condition to improve over the next 3 days.  She may return to work/school when improved.    Sincerely,        Scott Pozo MD

## 2019-12-05 NOTE — ED PROVIDER NOTES
History     Chief Complaint:  Shoulder Pain    HPI   Babita Hartley is a 34 year old female, with a history of hypothyroidism, who presents to the emergency department for evaluation of right shoulder pain. The patient reports she has been experiencing pain to her right side of her neck and down the shoulder blade to her mid back for the past two weeks prior to evaluation. She notes the pain has been constant but is worse at morning and at the end of the day. She notes she has been experiencing numbness to her right arm and tonight she had dizziness while driving, prompting her evaluation. She denies any trauma or fall to the area. She denies any headache, fever, chest pain, shortness of breath, or incontinence. She notes she has had this on her left side as well but not as severe. She notes the pain does not worsen with moving her arm.    Allergies:  No known drug allergies     Medications:    Adderall  Levothyroxine  Spironolactone  Ambien  Fluconazole  Ortho tri-cyclen  Zantac  Trazodone    Past Medical History:    ADHD  Hypothyroidism  Depression  Chronic constipation  Anxiety  Ectopic pregnancy    Past Surgical History:    Breast reduction surgery  Ectopic pregnancy surgery  Right salpingotomy    Family History:    HTN  Kidney stones  Lipids  Thyroid disease    Social History:  Smoking status: Never  Alcohol use: Yes  Drug use: No  The patient presents to the emergency department by herself.  PCP: Staci Evans  Marital Status:  Single [1]     Review of Systems   Constitutional: Negative for fever.   Respiratory: Negative for shortness of breath.    Cardiovascular: Negative for chest pain.   Genitourinary:        Negative for incontinence   Musculoskeletal: Positive for arthralgias, myalgias and neck pain.   Neurological: Positive for dizziness and numbness. Negative for headaches.         Physical Exam     Patient Vitals for the past 24 hrs:   BP Temp Temp src Heart Rate Resp SpO2 Height Weight  "  12/04/19 1751 134/80 98.2  F (36.8  C) Oral 102 14 100 % 1.626 m (5' 4\") 54.4 kg (120 lb)     Physical Exam  General: Alert and Interactive.   Head: No signs of trauma.   Mouth/Throat: Oropharynx is clear and moist.   Eyes: Conjunctivae are normal. Pupils are equal, round, and reactive to light.   Neck: Normal range of motion. No nuchal rigidity.   CV: Normal rate and regular rhythm.    Resp: Effort normal and breath sounds normal. No respiratory distress.   GI: Soft. There is no tenderness or guarding.   MSK: Normal range of motion. no edema.   Neuro: The patient is alert and oriented to person, place, and time.  PERRLA, EOMI, strength in upper/lower extremities normal and symmetrical.   Sensation normal. Speech normal.  GCS eye subscore is 4. GCS verbal subscore is 5. GCS motor subscore is 6.   Skin: Skin is warm and dry. No rash noted.   Psych: normal mood and affect. behavior is normal.     Emergency Department Course   Imaging:  Radiology findings were communicated with the patient and family who voiced understanding of the findings.    XR Cervical spine, 2-3 Views  IMPRESSION: Degenerative changes at C5-6.  As read by Radiology.    Interventions:  1832 Tylenol 1000 mg PO    Emergency Department Course:  Past medical records, nursing notes, and vitals reviewed.  1809: I performed an exam of the patient and obtained history, as documented above.    The patient was sent for a cervical spine while in the emergency department, findings above.    1923: I rechecked the patient. Findings and plan explained to the Patient. Patient discharged home with instructions regarding supportive care, medications, and reasons to return. The importance of close follow-up was reviewed.   Impression & Plan    Medical Decision Making:  Babita Hartley is a 34 year old female who presents with neck pain and radicular symptoms.  The patient did not sustain any trauma. X-rays were obtained and were negative. Advanced imaging with " CT/MRI is not indicated at this time, but may be indicated in the future if symptoms fail to resolve.  The patient has not had a fever, saddle/perineal anesthesia, bilateral foot numbness, or bowel or bladder dysfunction.  There is no midline tenderness to percussion or clinical evidence of cauda equina syndrome, discitis, spinal/epidural space hematoma or epidural abscess and nothing in the history that raises red flags for these pathologies.  The neurological exam is normal.  The patient was advised that radiculopathy often takes significant time to resolve, and that follow up with primary care, neurology and/or neurosurgery will be indicated if symptoms do not improve. The patient will be discharged with pain medications to use as directed, and steroids to attempt to assist with nerve inflammation.  No heavy lifting, bending or twisting. Return if increasing pain, muscular weakness, or bowel or bladder dysfunction.    Diagnosis:    ICD-10-CM   1. Cervical radiculopathy M54.12     Disposition:  Discharged to home with instructions for follow up.    Discharge Medications:  New Prescriptions    CYCLOBENZAPRINE (FLEXERIL) 5 MG TABLET    Take 1-2 tablets (5-10 mg) by mouth 3 times daily as needed for muscle spasms    METHYLPREDNISOLONE (MEDROL DOSEPAK) 4 MG TABLET THERAPY PACK    Follow Package Directions    OXYCODONE (ROXICODONE) 5 MG TABLET    Take 1-2 tablets (5-10 mg) by mouth every 6 hours as needed for pain     Blanca Narveaz  12/4/2019    EMERGENCY DEPARTMENT  Scribe Disclosure:  I, Blanca Narvaez, am serving as a scribe at 6:09 PM on 12/4/2019 to document services personally performed by Scott Pozo MD based on my observations and the provider's statements to me.      Scott Pozo MD  12/05/19 0040

## 2019-12-12 ENCOUNTER — MYC MEDICAL ADVICE (OUTPATIENT)
Dept: FAMILY MEDICINE | Facility: CLINIC | Age: 34
End: 2019-12-12

## 2019-12-13 NOTE — TELEPHONE ENCOUNTER
See [employer] message.  Patient requesting imaging.    Yojana Logan RN- Triage [employer]

## 2019-12-16 ENCOUNTER — OFFICE VISIT (OUTPATIENT)
Dept: FAMILY MEDICINE | Facility: CLINIC | Age: 34
End: 2019-12-16
Payer: COMMERCIAL

## 2019-12-16 VITALS
HEIGHT: 64 IN | DIASTOLIC BLOOD PRESSURE: 72 MMHG | BODY MASS INDEX: 21.17 KG/M2 | WEIGHT: 124 LBS | OXYGEN SATURATION: 100 % | HEART RATE: 88 BPM | SYSTOLIC BLOOD PRESSURE: 117 MMHG

## 2019-12-16 DIAGNOSIS — G47.00 INSOMNIA, UNSPECIFIED TYPE: ICD-10-CM

## 2019-12-16 DIAGNOSIS — M62.838 MUSCLE SPASM: ICD-10-CM

## 2019-12-16 DIAGNOSIS — F90.0 ADHD (ATTENTION DEFICIT HYPERACTIVITY DISORDER), INATTENTIVE TYPE: ICD-10-CM

## 2019-12-16 DIAGNOSIS — L70.0 ACNE VULGARIS: ICD-10-CM

## 2019-12-16 DIAGNOSIS — M54.12 CERVICAL RADICULAR PAIN: Primary | ICD-10-CM

## 2019-12-16 PROCEDURE — 99214 OFFICE O/P EST MOD 30 MIN: CPT | Performed by: INTERNAL MEDICINE

## 2019-12-16 RX ORDER — DEXTROAMPHETAMINE SACCHARATE, AMPHETAMINE ASPARTATE, DEXTROAMPHETAMINE SULFATE AND AMPHETAMINE SULFATE 3.75; 3.75; 3.75; 3.75 MG/1; MG/1; MG/1; MG/1
15 TABLET ORAL 2 TIMES DAILY
Qty: 60 TABLET | Refills: 0 | Status: SHIPPED | OUTPATIENT
Start: 2019-12-16 | End: 2020-01-28

## 2019-12-16 RX ORDER — CYCLOBENZAPRINE HCL 5 MG
5-10 TABLET ORAL 3 TIMES DAILY PRN
Qty: 15 TABLET | Refills: 3 | Status: SHIPPED | OUTPATIENT
Start: 2019-12-16 | End: 2019-12-27

## 2019-12-16 RX ORDER — ZOLPIDEM TARTRATE 10 MG/1
10 TABLET ORAL
Qty: 15 TABLET | Refills: 1 | Status: SHIPPED | OUTPATIENT
Start: 2019-12-16 | End: 2020-02-10

## 2019-12-16 RX ORDER — SPIRONOLACTONE 100 MG/1
100 TABLET, FILM COATED ORAL DAILY
Qty: 90 TABLET | Refills: 4 | Status: SHIPPED | OUTPATIENT
Start: 2019-12-16 | End: 2020-02-10

## 2019-12-16 ASSESSMENT — MIFFLIN-ST. JEOR: SCORE: 1237.46

## 2019-12-16 ASSESSMENT — PATIENT HEALTH QUESTIONNAIRE - PHQ9: SUM OF ALL RESPONSES TO PHQ QUESTIONS 1-9: 2

## 2019-12-16 NOTE — PROGRESS NOTES
"Subjective     Babita Hartley is a 34 year old female who presents to clinic today for the following health issues:    HPI   Cervical Radiculopathy   -She went to the ER on 12/04/2019 for cervical radiculopathy symptoms in her right arm. She was given steroids for inflammation but she didn't take it and symptoms got better. 4 days ago she had \"horrible pain\", but the next day it was better.   -She works on ROM exercises and stretches her arms.  -The arm pain will flare intermittently, her father who is a urologist thought perhaps an MRI would be a good idea.  -She works as a surgical assistant in small rooms and notes that she is often standing in awkward positions that commonly flares her radiculopathy.   -Takes flexeril prn  -Questions if playing competitive tennis when she was younger aggravated her radiculopathy symptoms, in addition to stress she currently has.       12/04/2019 XR Cervical Spine     IMPRESSION: Degenerative changes at C5-6.     SUZETTE BEE MD    12/04/2019 ED   Impression & Plan    Medical Decision Making:  Babita aHrtley is a 34 year old female who presents with neck pain and radicular symptoms.  The patient did not sustain any trauma. X-rays were obtained and were negative. Advanced imaging with CT/MRI is not indicated at this time, but may be indicated in the future if symptoms fail to resolve.  The patient has not had a fever, saddle/perineal anesthesia, bilateral foot numbness, or bowel or bladder dysfunction.  There is no midline tenderness to percussion or clinical evidence of cauda equina syndrome, discitis, spinal/epidural space hematoma or epidural abscess and nothing in the history that raises red flags for these pathologies.  The neurological exam is normal.  The patient was advised that radiculopathy often takes significant time to resolve, and that follow up with primary care, neurology and/or neurosurgery will be indicated if symptoms do not improve. The patient will be " discharged with pain medications to use as directed, and steroids to attempt to assist with nerve inflammation.  No heavy lifting, bending or twisting. Return if increasing pain, muscular weakness, or bowel or bladder dysfunction.   .    ADHD  Well controlled with Adderall, 1 tablet twice daily     Acne  Takes spironolactone once daily for acne, works well. Uses tretinoin prn.     Insomnia   Takes 5 mg zolpidem prn for sleep. She is aware of the precautions she should take with this medication. On nights she takes her flexeril she will not take zolpidem         Patient Active Problem List   Diagnosis     CARDIOVASCULAR SCREENING; LDL GOAL LESS THAN 160     Chronic constipation     Insomnia     Anxiety     Mild major depression (H)     Hypothyroidism     ASCUS of cervix with negative high risk HPV     ADHD (attention deficit hyperactivity disorder), inattentive type     Past Surgical History:   Procedure Laterality Date     BREAST SURGERY      breast reduction surgery     ECTOPIC PREGNANCY SURGERY       LAPAROSCOPIC SALPINGOTOMY  9/26/11    right for ectopic       Social History     Tobacco Use     Smoking status: Never Smoker     Smokeless tobacco: Never Used   Substance Use Topics     Alcohol use: Yes     Comment: Once a week     Family History   Problem Relation Age of Onset     Hypertension Father      Genitourinary Problems Father         Kidney stones     Lipids Father      Thyroid Disease Father      Cerebrovascular Disease Paternal Grandfather      Cancer Paternal Grandmother         Pancreatic cancer     Cardiovascular Maternal Grandfather         Heart attack     Thyroid Disease Mother      Breast Cancer No family hx of      Colon Cancer No family hx of              Reviewed and updated as needed this visit by Provider         Review of Systems   ROS COMP: CONSTITUTIONAL: NEGATIVE for fever, chills, change in weight  INTEGUMENTARY/SKIN: NEGATIVE for worrisome rashes, moles or lesions  EYES: NEGATIVE for  "vision changes or irritation  ENT/MOUTH: NEGATIVE for ear, mouth and throat problems  RESP: NEGATIVE for significant cough or SOB  BREAST: NEGATIVE for masses, tenderness or discharge  CV: NEGATIVE for chest pain, palpitations or peripheral edema  GI: NEGATIVE for nausea, abdominal pain, heartburn, or change in bowel habits  : NEGATIVE for frequency, dysuria, or hematuria  MUSCULOSKELETAL: NEGATIVE for significant arthralgias or myalgia  NEURO: NEGATIVE for weakness, dizziness or paresthesias  ENDOCRINE: NEGATIVE for temperature intolerance, skin/hair changes  HEME: NEGATIVE for bleeding problems  PSYCHIATRIC: NEGATIVE for changes in mood or affect    This document serves as a record of the services and decisions personally performed by ROSANA HELLER It was created on his/her behalf by Taty Goodwin, a trained medical scribe. The creation of this document is based on the provider's statements to the medical scribe. Taty Goodwin, December 16, 2019 4:11 PM        Objective    /72 (BP Location: Right arm, Patient Position: Chair, Cuff Size: Adult Regular)   Pulse 88   Ht 1.626 m (5' 4\")   Wt 56.2 kg (124 lb)   SpO2 100%   BMI 21.28 kg/m    Body mass index is 21.28 kg/m .  Physical Exam   GENERAL APPEARANCE: healthy, alert and no distress  EYES: Eyes grossly normal to inspection, PERRL and conjunctivae and sclerae normal  HENT: ear canals and TM's normal and nose and mouth without ulcers or lesions  NECK: no adenopathy  RESP: lungs clear to auscultation - no rales, rhonchi or wheezes  CV: regular rates and rhythm, normal S1 S2, no S3  MS:  no gross musculoskeletal defects noted, no edema   NEURO: Normal strength and tone in UEs, sensory exam grossly normal, mentation intact and speech, UE reflexes 1+ equal bilaterally to reflex hammer    Diagnostic Test Results:  Labs reviewed in Epic        Assessment & Plan        Diagnoses and all orders for this visit:    Cervical radicular pain  I do not think " MRI is necessary right now as pain is intermittent and has improved today   If radicular pain recurs, pt call me and I will order an MRI of the cervical spine. Continue use of flexeril prn. Schedule with ortho to learn proper exercises/preventative measures for radiculopathy.   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    Acne vulgaris  -     spironolactone (ALDACTONE) 100 MG tablet; Take 1 tablet (100 mg) by mouth daily    ADHD (attention deficit hyperactivity disorder), inattentive type  Controlled with Adderall daily. Pt will contact for refills, follow-up in 6 months.   -     amphetamine-dextroamphetamine (ADDERALL) 15 MG tablet; Take 1 tablet (15 mg) by mouth 2 times daily    Insomnia, unspecified type  Discussed that Ambien can be habit forming and discussed precautions to take while on this medication, pt voices understanding.   -     zolpidem (AMBIEN) 10 MG tablet; Take 1 tablet (10 mg) by mouth nightly as needed for sleep    Muscle spasm  Continue use of flexeril prn.  -     cyclobenzaprine (FLEXERIL) 5 MG tablet; Take 1-2 tablets (5-10 mg) by mouth 3 times daily as needed for muscle spasms         Patient Instructions   Ambien can be habit-forming. It should be taken as prescribed. Do not mix it  with alcohol. Be careful with driving.Do not loose the  Prescription.  Do not overuse this medication. It is a controlled substance.  Do not take Ambien and cyclobenzaprine together as it may cause oversedation     Make appointment with orthopedics    Follow up in 6 months    Seek sooner medical attention if there is any worsening of symptoms or problems.          Return in about 6 months (around 6/16/2020) for medication follow up, ADHD.  Length of visit was 20 minutes with more than 50 percent of that time used for discussing medical concerns and education    The information in this document, created by the  medical scribe for me, accurately reflects the services I personally performed and the decisions made by me. I have reviewed and approved this document for accuracy.   Staci Evans MD  [facility]

## 2019-12-16 NOTE — PATIENT INSTRUCTIONS
Ambien can be habit-forming. It should be taken as prescribed. Do not mix it  with alcohol. Be careful with driving.Do not loose the  Prescription.  Do not overuse this medication. It is a controlled substance.  Do not take Ambien and cyclobenzaprine together as it may cause oversedation     Make appointment with orthopedics    Follow up in 6 months    Seek sooner medical attention if there is any worsening of symptoms or problems.

## 2019-12-22 ENCOUNTER — HOSPITAL ENCOUNTER (OUTPATIENT)
Dept: MRI IMAGING | Facility: CLINIC | Age: 34
Discharge: HOME | End: 2019-12-22
Attending: INTERNAL MEDICINE | Admitting: INTERNAL MEDICINE
Payer: COMMERCIAL

## 2019-12-22 DIAGNOSIS — M54.12 CERVICAL RADICULAR PAIN: ICD-10-CM

## 2019-12-22 DIAGNOSIS — M54.12 CERVICAL RADICULAR PAIN: Primary | ICD-10-CM

## 2019-12-22 PROCEDURE — 72141 MRI NECK SPINE W/O DYE: CPT

## 2019-12-27 ENCOUNTER — TELEPHONE (OUTPATIENT)
Dept: FAMILY MEDICINE | Facility: CLINIC | Age: 34
End: 2019-12-27

## 2019-12-27 ENCOUNTER — OFFICE VISIT (OUTPATIENT)
Dept: NEUROSURGERY | Facility: CLINIC | Age: 34
End: 2019-12-27
Attending: PHYSICIAN ASSISTANT
Payer: COMMERCIAL

## 2019-12-27 VITALS
WEIGHT: 152 LBS | RESPIRATION RATE: 16 BRPM | SYSTOLIC BLOOD PRESSURE: 118 MMHG | DIASTOLIC BLOOD PRESSURE: 74 MMHG | OXYGEN SATURATION: 99 % | BODY MASS INDEX: 25.95 KG/M2 | HEART RATE: 97 BPM | HEIGHT: 64 IN | TEMPERATURE: 97.9 F

## 2019-12-27 DIAGNOSIS — M62.838 MUSCLE SPASM: Primary | ICD-10-CM

## 2019-12-27 DIAGNOSIS — M54.12 CERVICAL RADICULOPATHY: Primary | ICD-10-CM

## 2019-12-27 PROCEDURE — 99203 OFFICE O/P NEW LOW 30 MIN: CPT | Performed by: PHYSICIAN ASSISTANT

## 2019-12-27 PROCEDURE — G0463 HOSPITAL OUTPT CLINIC VISIT: HCPCS

## 2019-12-27 RX ORDER — DIAZEPAM 2 MG
2 TABLET ORAL EVERY 6 HOURS PRN
Qty: 15 TABLET | Refills: 0 | Status: SHIPPED | OUTPATIENT
Start: 2019-12-27 | End: 2020-01-31

## 2019-12-27 ASSESSMENT — MIFFLIN-ST. JEOR: SCORE: 1364.47

## 2019-12-27 ASSESSMENT — PAIN SCALES - GENERAL: PAINLEVEL: SEVERE PAIN (7)

## 2019-12-27 NOTE — PROGRESS NOTES
Dr. Ashwin Resendiz  [facility] Neurosurgery Clinic Visit      CC: Right arm pain    Primary care Provider: Staci Evans      Reason For Visit:   I was asked by Staci Evans MD to consult on the patient for Cervical radicular pain.      HPI: Babita Hartley is a 34 year old female who presents for evaluation of neck and right arm pain x 3-4 weeks. Pain is located in right neck and radiates down right arm to the forearm and also radiates down toward right flank. Describes the pain as sharp and intermittent. She does get paresthesias associated in this pattern as well. Cannot recall anything that worsens/alleviates the pain. She has tried oxycodone and flexeril, without relief. Ibuprofen provides mild relief. No recent PT or injections. Denies bladder/bowel incontinence.    Current pain: 6-7/10 At worst: 9/10    Past Medical History:   Diagnosis Date     Anxiety      ASCUS of cervix with negative high risk HPV 10/02/2017    10/2/17 ASCUS, Neg HPV     Constipation      Ectopic pregnancy 9/26/11     Thyroid disease     hypothyroidism       Past Medical History reviewed with patient during visit.    Past Surgical History:   Procedure Laterality Date     BREAST SURGERY      breast reduction surgery     ECTOPIC PREGNANCY SURGERY       LAPAROSCOPIC SALPINGOTOMY  9/26/11    right for ectopic     Past Surgical History reviewed with patient during visit.    Current Outpatient Medications   Medication     amphetamine-dextroamphetamine (ADDERALL) 15 MG tablet     cyclobenzaprine (FLEXERIL) 5 MG tablet     levothyroxine (SYNTHROID/LEVOTHROID) 125 MCG tablet     spironolactone (ALDACTONE) 100 MG tablet     traZODone (DESYREL) 50 MG tablet     tretinoin (RETIN-A) 0.025 % cream     zolpidem (AMBIEN) 10 MG tablet     No current facility-administered medications for this visit.        Allergies   Allergen Reactions     Weed [Grass] Other (See Comments)     Nasal congestion, rhinitis       Social History     Socioeconomic  History     Marital status: Single     Spouse name: Not on file     Number of children: Not on file     Years of education: Not on file     Highest education level: Not on file   Occupational History     Not on file   Social Needs     Financial resource strain: Not on file     Food insecurity:     Worry: Not on file     Inability: Not on file     Transportation needs:     Medical: Not on file     Non-medical: Not on file   Tobacco Use     Smoking status: Never Smoker     Smokeless tobacco: Never Used   Substance and Sexual Activity     Alcohol use: Yes     Comment: Once a week     Drug use: No     Sexual activity: Yes     Partners: Male   Lifestyle     Physical activity:     Days per week: Not on file     Minutes per session: Not on file     Stress: Not on file   Relationships     Social connections:     Talks on phone: Not on file     Gets together: Not on file     Attends Latter day service: Not on file     Active member of club or organization: Not on file     Attends meetings of clubs or organizations: Not on file     Relationship status: Not on file     Intimate partner violence:     Fear of current or ex partner: Not on file     Emotionally abused: Not on file     Physically abused: Not on file     Forced sexual activity: Not on file   Other Topics Concern     Parent/sibling w/ CABG, MI or angioplasty before 65F 55M? Not Asked   Social History Narrative     Not on file       Family History   Problem Relation Age of Onset     Hypertension Father      Genitourinary Problems Father         Kidney stones     Lipids Father      Thyroid Disease Father      Cerebrovascular Disease Paternal Grandfather      Cancer Paternal Grandmother         Pancreatic cancer     Cardiovascular Maternal Grandfather         Heart attack     Thyroid Disease Mother      Breast Cancer No family hx of      Colon Cancer No family hx of           ROS: 10 point ROS neg other than the symptoms noted above in the HPI.    Vital Signs: There were  no vitals taken for this visit.    Examination:  Constitutional:  Alert, well nourished, NAD.  HEENT: Normocephalic, atraumatic.   Pulmonary:  Without shortness of breath, normal effort.   Lymph: no lymphadenopathy to low back or LE.   Integumentary: Skin is free of rashes or lesions.   Cardiovascular:  No pitting edema of BLE.      Neurological:  Awake  Alert  Oriented x 3  Speech clear  Cranial nerves II - XII grossly intact  PERRL  EOMI  Face symmetric  Tongue midline  Motor exam   Shoulder Abduction:  Right:  5/5   Left:  5/5  Biceps:                      Right:  5/5   Left:  5/5  Triceps:                     Right:  5/5   Left:  5/5  Wrist Extensors:       Right:  5/5   Left:  5/5  Wrist Flexors:           Right:  5/5   Left:  5/5  Intrinsics:                   Right:  5/5   Left:  5/5      Sensation normal to bilateral upper extremities.    Reflexes are 2+ in the brachial radialis and triceps. Negative Dennis sign bilaterally.    Musculoskeletal:  Gait: Able to stand from a seated position. Normal non-antalgic, non-myelopathic gait.  Able to heel/toe walk without loss of balance  Cervical examination reveals good range of motion, but increased pain with rotation bilaterally.  No tenderness to palpation of the cervical spine. Mild tenderness to trapezius on the right.    Imaging:   MRI of the cervical spine from 12/22/2019 was reviewed in the office today.     IMPRESSION:    1. Central disc extrusion at C6-C7 contributing to moderate spinal canal stenosis.  2. Right central disc protrusion at C5-C6 contributing to mild spinal canal stenosis.    Assessment/Plan:   Babita Hartley is a 34 year old female who presents for evaluation of neck and right arm pain x 3-4 weeks. Pain is located in right neck and radiates down right arm to the forearm and also radiates down toward right flank. Describes the pain as sharp and intermittent. She does get paresthesias associated in this pattern as well. Cervical MRI  reviewed in office.  She has tried oxycodone and flexeril, without relief. Ibuprofen provides mild relief. Will refer for course of PT and right C5-6 JUAN. If pain persists, can consider RUE EMG. Patient voiced understanding and agreement.            Yael Llamas PA-C  [facility] Neurosurgery  [address]  [address] [place] [id number]    Tel [phone]  Pager [phone]

## 2019-12-27 NOTE — TELEPHONE ENCOUNTER
Inform the patient that we have prescribed cyclobenzaprine already for muscle relaxation  Valium is also a muscle relaxer so no need to give both  Her pain is due to bulging disc  It will improve after the injection hopefully  She can also use over-the-counter lidocaine or salon Pas patches to the affected area.  Dr.Nasima Nathan MD

## 2019-12-27 NOTE — TELEPHONE ENCOUNTER
"Oxycodone is not on pt's med list - per dispense report, pt received #20 tabs (4 day supply) - Oxycodone 5mg IR by provider Scott Crespo in [place] on [place] 12/4/19 (hospitalist at [facility])     Marked as discontinued 12/16 by MA \"med reconciliation clean up\"     Per below pt asking for Valium instead for bulging disc pain     Please advise     Camille CARTER RN        "

## 2019-12-27 NOTE — TELEPHONE ENCOUNTER
Let the patient know that it is not a good idea to take too many controlled substances  I am giving her very short supply of Valium as she is requesting  Prescription sent to the pharmacy  These medications are considered high risk medications  It is very important that she does not take Ambien with Valium to avoid oversedation  Hold Ambien while she is on Valium  Cyclobenzaprine is discontinued  Valium will be only for short-term  Do not take the oxycodone and Valium at the same time as it may cause respiratory depression  Dr.Nasima Nathan MD

## 2019-12-27 NOTE — NURSING NOTE
"Babita Hartley is a 34 year old female who presents for:  Chief Complaint   Patient presents with     Neck Pain        Initial Vitals:  /74   Pulse 97   Temp 97.9  F (36.6  C) (Oral)   Resp 16   Ht 5' 4\" (1.626 m)   Wt 152 lb (68.9 kg)   SpO2 99%   BMI 26.09 kg/m   Estimated body mass index is 26.09 kg/m  as calculated from the following:    Height as of this encounter: 5' 4\" (1.626 m).    Weight as of this encounter: 152 lb (68.9 kg).. Body surface area is 1.76 meters squared. BP completed using cuff size: regular  Severe Pain (7)        Nursing Comments: Pt present today for neck pain.        Tae Aguayo, ZEB    "

## 2019-12-27 NOTE — LETTER
12/27/2019         RE: Babita Hartley  [address]  [place] [address]        Dear Colleague,    Thank you for referring your patient, Babita Hartley, to the [facility] NEUROSURGERY CLINIC. Please see a copy of my visit note below.    Dr. Ashwin KIRBY [facility]surgery Clinic Visit      CC: Right arm pain    Primary care Provider: Staci Evans      Reason For Visit:   I was asked by Staci Evans MD to consult on the patient for Cervical radicular pain.      HPI: Babita Hartley is a 34 year old female who presents for evaluation of neck and right arm pain x 3-4 weeks. Pain is located in right neck and radiates down right arm to the forearm and also radiates down toward right flank. Describes the pain as sharp and intermittent. She does get paresthesias associated in this pattern as well. Cannot recall anything that worsens/alleviates the pain. She has tried oxycodone and flexeril, without relief. Ibuprofen provides mild relief. No recent PT or injections. Denies bladder/bowel incontinence.    Current pain: 6-7/10 At worst: 9/10    Past Medical History:   Diagnosis Date     Anxiety      ASCUS of cervix with negative high risk HPV 10/02/2017    10/2/17 ASCUS, Neg HPV     Constipation      Ectopic pregnancy 9/26/11     Thyroid disease     hypothyroidism       Past Medical History reviewed with patient during visit.    Past Surgical History:   Procedure Laterality Date     BREAST SURGERY      breast reduction surgery     ECTOPIC PREGNANCY SURGERY       LAPAROSCOPIC SALPINGOTOMY  9/26/11    right for ectopic     Past Surgical History reviewed with patient during visit.    Current Outpatient Medications   Medication     amphetamine-dextroamphetamine (ADDERALL) 15 MG tablet     cyclobenzaprine (FLEXERIL) 5 MG tablet     levothyroxine (SYNTHROID/LEVOTHROID) 125 MCG tablet     spironolactone (ALDACTONE) 100 MG tablet     traZODone (DESYREL) 50 MG tablet     tretinoin  (RETIN-A) 0.025 % cream     zolpidem (AMBIEN) 10 MG tablet     No current facility-administered medications for this visit.        Allergies   Allergen Reactions     Weed [Grass] Other (See Comments)     Nasal congestion, rhinitis       Social History     Socioeconomic History     Marital status: Single     Spouse name: Not on file     Number of children: Not on file     Years of education: Not on file     Highest education level: Not on file   Occupational History     Not on file   Social Needs     Financial resource strain: Not on file     Food insecurity:     Worry: Not on file     Inability: Not on file     Transportation needs:     Medical: Not on file     Non-medical: Not on file   Tobacco Use     Smoking status: Never Smoker     Smokeless tobacco: Never Used   Substance and Sexual Activity     Alcohol use: Yes     Comment: Once a week     Drug use: No     Sexual activity: Yes     Partners: Male   Lifestyle     Physical activity:     Days per week: Not on file     Minutes per session: Not on file     Stress: Not on file   Relationships     Social connections:     Talks on phone: Not on file     Gets together: Not on file     Attends Restoration service: Not on file     Active member of club or organization: Not on file     Attends meetings of clubs or organizations: Not on file     Relationship status: Not on file     Intimate partner violence:     Fear of current or ex partner: Not on file     Emotionally abused: Not on file     Physically abused: Not on file     Forced sexual activity: Not on file   Other Topics Concern     Parent/sibling w/ CABG, MI or angioplasty before 65F 55M? Not Asked   Social History Narrative     Not on file       Family History   Problem Relation Age of Onset     Hypertension Father      Genitourinary Problems Father         Kidney stones     Lipids Father      Thyroid Disease Father      Cerebrovascular Disease Paternal Grandfather      Cancer Paternal Grandmother         Pancreatic  cancer     Cardiovascular Maternal Grandfather         Heart attack     Thyroid Disease Mother      Breast Cancer No family hx of      Colon Cancer No family hx of           ROS: 10 point ROS neg other than the symptoms noted above in the HPI.    Vital Signs: There were no vitals taken for this visit.    Examination:  Constitutional:  Alert, well nourished, NAD.  HEENT: Normocephalic, atraumatic.   Pulmonary:  Without shortness of breath, normal effort.   Lymph: no lymphadenopathy to low back or LE.   Integumentary: Skin is free of rashes or lesions.   Cardiovascular:  No pitting edema of BLE.      Neurological:  Awake  Alert  Oriented x 3  Speech clear  Cranial nerves II - XII grossly intact  PERRL  EOMI  Face symmetric  Tongue midline  Motor exam   Shoulder Abduction:  Right:  5/5   Left:  5/5  Biceps:                      Right:  5/5   Left:  5/5  Triceps:                     Right:  5/5   Left:  5/5  Wrist Extensors:       Right:  5/5   Left:  5/5  Wrist Flexors:           Right:  5/5   Left:  5/5  Intrinsics:                   Right:  5/5   Left:  5/5      Sensation normal to bilateral upper extremities.    Reflexes are 2+ in the brachial radialis and triceps. Negative Dennis sign bilaterally.    Musculoskeletal:  Gait: Able to stand from a seated position. Normal non-antalgic, non-myelopathic gait.  Able to heel/toe walk without loss of balance  Cervical examination reveals good range of motion, but increased pain with rotation bilaterally.  No tenderness to palpation of the cervical spine. Mild tenderness to trapezius on the right.    Imaging:   MRI of the cervical spine from 12/22/2019 was reviewed in the office today.     IMPRESSION:    1. Central disc extrusion at C6-C7 contributing to moderate spinal canal stenosis.  2. Right central disc protrusion at C5-C6 contributing to mild spinal canal stenosis.    Assessment/Plan:   Babita Hartley is a 34 year old female who presents for evaluation of neck  and right arm pain x 3-4 weeks. Pain is located in right neck and radiates down right arm to the forearm and also radiates down toward right flank. Describes the pain as sharp and intermittent. She does get paresthesias associated in this pattern as well. Cervical MRI reviewed in office.  She has tried oxycodone and flexeril, without relief. Ibuprofen provides mild relief. Will refer for course of PT and right C5-6 JUAN. If pain persists, can consider RUE EMG. Patient voiced understanding and agreement.            Yael Llamas PA-C  [facility] Neurosurgery  [address] [address]    Tel [phone]  Pager [phone]        Again, thank you for allowing me to participate in the care of your patient.        Sincerely,        Yael Llamas PA-C

## 2019-12-27 NOTE — PATIENT INSTRUCTIONS
Physical therapy and cervical steroid injection ordered. They will contact you to schedule.     Please contact the clinic if pain persists at [phone].

## 2019-12-27 NOTE — TELEPHONE ENCOUNTER
Reason for Call:  Medication or medication refill:    Do you use a [facility]?  Name of the pharmacy and phone number for the current request:       [id number] - [address]        Name of the medication requested: Pt was prescribed oxycodone for a bulging disk, and it was not effective for her.  She is wondering if Valium would work better.  She is hoping to get a short term supply.  She is getting a steroid injection soon.      Other request:     Can we leave a detailed message on this number? YES    Phone number patient can be reached at: Home number on file [phone] (home)    Best Time: any    Call taken on 12/27/2019 at 9:52 AM by Tess Chandler

## 2019-12-27 NOTE — TELEPHONE ENCOUNTER
"Spoke with patient:     She states that her father is an MD and suggested Valium to replace flexeril then discusses:     She saw spine specialist (on 4th floor)     She states \"flexeril does not do anything for me, I assume the valium is stronger\"     Painful at night, difficult to find a position to sleep \"I don't take the flexeril because it doesn't do anything\"     Pt just returned pack of Salon Pas to the pharmacy because \"those didn't do anything either\"     States she doesn't want to be on Valium long term, just until the injection which she will get as soon as possible.     At night it is \"really, really painful\"     She states \"if she just won't prescribe valium, that is fine, too\"     Please advise,   Thank you,   Lynette SOTO RN        "

## 2020-01-01 ENCOUNTER — MYC MEDICAL ADVICE (OUTPATIENT)
Dept: FAMILY MEDICINE | Facility: CLINIC | Age: 35
End: 2020-01-01

## 2020-01-01 DIAGNOSIS — M25.512 ACUTE PAIN OF BOTH SHOULDERS: Primary | ICD-10-CM

## 2020-01-01 DIAGNOSIS — M25.511 ACUTE PAIN OF BOTH SHOULDERS: Primary | ICD-10-CM

## 2020-01-02 NOTE — TELEPHONE ENCOUNTER
To PCP/Covering Provider:     Pt is requesting bilateral shoulder and chest xray to f/u on severe back pain (Please see message, Pt's father is an MD and Pt had recent visit at Spine Clinic)     Please advise,     Thank you,   Lynette SOTO RN

## 2020-01-03 ENCOUNTER — ANCILLARY PROCEDURE (OUTPATIENT)
Dept: GENERAL RADIOLOGY | Facility: CLINIC | Age: 35
End: 2020-01-03
Attending: INTERNAL MEDICINE
Payer: COMMERCIAL

## 2020-01-03 DIAGNOSIS — M25.512 ACUTE PAIN OF BOTH SHOULDERS: ICD-10-CM

## 2020-01-03 DIAGNOSIS — M25.511 ACUTE PAIN OF BOTH SHOULDERS: ICD-10-CM

## 2020-01-03 PROCEDURE — 71046 X-RAY EXAM CHEST 2 VIEWS: CPT

## 2020-01-10 ENCOUNTER — OFFICE VISIT (OUTPATIENT)
Dept: NEUROSURGERY | Facility: CLINIC | Age: 35
End: 2020-01-10
Attending: NEUROLOGICAL SURGERY
Payer: COMMERCIAL

## 2020-01-10 VITALS
WEIGHT: 123 LBS | HEIGHT: 64 IN | TEMPERATURE: 97.7 F | DIASTOLIC BLOOD PRESSURE: 77 MMHG | RESPIRATION RATE: 16 BRPM | BODY MASS INDEX: 21 KG/M2 | SYSTOLIC BLOOD PRESSURE: 114 MMHG | HEART RATE: 91 BPM | OXYGEN SATURATION: 99 %

## 2020-01-10 DIAGNOSIS — M54.12 CERVICAL RADICULOPATHY: Primary | ICD-10-CM

## 2020-01-10 PROCEDURE — G0463 HOSPITAL OUTPT CLINIC VISIT: HCPCS

## 2020-01-10 PROCEDURE — 99213 OFFICE O/P EST LOW 20 MIN: CPT | Performed by: NEUROLOGICAL SURGERY

## 2020-01-10 RX ORDER — METHYLPREDNISOLONE 4 MG
TABLET, DOSE PACK ORAL
Qty: 21 TABLET | Refills: 0 | Status: SHIPPED | OUTPATIENT
Start: 2020-01-10 | End: 2020-02-10

## 2020-01-10 RX ORDER — OXYCODONE HYDROCHLORIDE 5 MG/1
5-10 TABLET ORAL EVERY 8 HOURS PRN
Qty: 15 TABLET | Refills: 0 | Status: SHIPPED | OUTPATIENT
Start: 2020-01-10 | End: 2020-02-10

## 2020-01-10 ASSESSMENT — MIFFLIN-ST. JEOR: SCORE: 1232.92

## 2020-01-10 ASSESSMENT — PAIN SCALES - GENERAL: PAINLEVEL: MODERATE PAIN (5)

## 2020-01-10 NOTE — PATIENT INSTRUCTIONS
-Order placed for epidural steroid injection. The steroid can take 10-14 days to reach max effect. Please call our clinic if symptoms persist after this timeframe.  You can call [facility] to schedule your injection: [phone]      -Order placed for physical therapy. You can call the phone number highlighted in the order to schedule your appointment. Please call our clinic if symptoms persist after your course of physical therapy.    -Orders for Oxycodone and Medrol Dosepak sent to your pharmacy    -Please call us if symptoms change/worsen/persist, or if you have further questions or concerns.     [facility] Neurosurgery  Phone: [phone]  Fax: [phone]

## 2020-01-10 NOTE — LETTER
1/10/2020         RE: Babita Hartley  [address]  [place] [facility] [place] [facility] [address]        Dear Colleague,    Thank you for referring your patient, Babita Hartley, to the [facility]. Please see a copy of my visit note below.    Babita Hartley is a 34 year old female who presents for evaluation of neck and right arm pain x 3-4 weeks. Pain is located in right neck and radiates down right arm to the forearm and also radiates down toward right flank. Describes the pain as sharp and intermittent. She does get paresthesias associated in this pattern as well. Cannot recall anything that worsens/alleviates the pain. She has tried oxycodone and flexeril, without relief. Ibuprofen provides mild relief. No recent PT or injections. Denies bladder/bowel incontinence.    She returns today for follow up.  Continued, episodic, sharp right arm pain radiating from neck to first 3 digits, several times a week.  9/10, severe pain when it comes on.  Over 2 months of duration, no exacerbating factors.  MR with right C5-6 foraminal extruded disc herniation and C6-7 central herniation.    Past Medical History:   Diagnosis Date     Anxiety      ASCUS of cervix with negative high risk HPV 10/02/2017    10/2/17 ASCUS, Neg HPV     Constipation      Ectopic pregnancy 9/26/11     Thyroid disease     hypothyroidism       Past Medical History reviewed with patient during visit.    Past Surgical History:   Procedure Laterality Date     BREAST SURGERY      breast reduction surgery     ECTOPIC PREGNANCY SURGERY       LAPAROSCOPIC SALPINGOTOMY  9/26/11    right for ectopic     Past Surgical History reviewed with patient during visit.    Current Outpatient Medications   Medication     levothyroxine (SYNTHROID/LEVOTHROID) 125 MCG tablet     methylPREDNISolone (MEDROL DOSEPAK) 4 MG tablet therapy pack     spironolactone (ALDACTONE) 100 MG tablet     amphetamine-dextroamphetamine (ADDERALL) 15 MG tablet      diazepam (VALIUM) 2 MG tablet     oxyCODONE (ROXICODONE) 5 MG tablet     traZODone (DESYREL) 50 MG tablet     tretinoin (RETIN-A) 0.025 % cream     zolpidem (AMBIEN) 10 MG tablet     No current facility-administered medications for this visit.        Allergies   Allergen Reactions     Weed [Grass] Other (See Comments)     Nasal congestion, rhinitis       Social History     Socioeconomic History     Marital status: Single     Spouse name: Not on file     Number of children: Not on file     Years of education: Not on file     Highest education level: Not on file   Occupational History     Not on file   Social Needs     Financial resource strain: Not on file     Food insecurity:     Worry: Not on file     Inability: Not on file     Transportation needs:     Medical: Not on file     Non-medical: Not on file   Tobacco Use     Smoking status: Never Smoker     Smokeless tobacco: Never Used   Substance and Sexual Activity     Alcohol use: Yes     Comment: Once a week     Drug use: No     Sexual activity: Yes     Partners: Male   Lifestyle     Physical activity:     Days per week: Not on file     Minutes per session: Not on file     Stress: Not on file   Relationships     Social connections:     Talks on phone: Not on file     Gets together: Not on file     Attends Evangelical service: Not on file     Active member of club or organization: Not on file     Attends meetings of clubs or organizations: Not on file     Relationship status: Not on file     Intimate partner violence:     Fear of current or ex partner: Not on file     Emotionally abused: Not on file     Physically abused: Not on file     Forced sexual activity: Not on file   Other Topics Concern     Parent/sibling w/ CABG, MI or angioplasty before 65F 55M? Not Asked   Social History Narrative     Not on file       Family History   Problem Relation Age of Onset     Hypertension Father      Genitourinary Problems Father         Kidney stones     Lipids Father       "Thyroid Disease Father      Cerebrovascular Disease Paternal Grandfather      Cancer Paternal Grandmother         Pancreatic cancer     Cardiovascular Maternal Grandfather         Heart attack     Thyroid Disease Mother      Breast Cancer No family hx of      Colon Cancer No family hx of           ROS: 10 point ROS neg other than the symptoms noted above in the HPI.    Vital Signs: /77   Pulse 91   Temp 97.7  F (36.5  C) (Oral)   Resp 16   Ht 1.626 m (5' 4\")   Wt 55.8 kg (123 lb)   SpO2 99%   BMI 21.11 kg/m       Examination:  Constitutional:  Alert, well nourished, NAD.  HEENT: Normocephalic, atraumatic.   Pulmonary:  Without shortness of breath, normal effort.   Lymph: no lymphadenopathy to low back or LE.   Integumentary: Skin is free of rashes or lesions.   Cardiovascular:  No pitting edema of BLE.      Neurological:  Awake  Alert  Oriented x 3  Speech clear  Cranial nerves II - XII grossly intact  PERRL  EOMI  Face symmetric  Tongue midline  Motor exam   Shoulder Abduction:  Right:  5/5   Left:  5/5  Biceps:                      Right:  5/5   Left:  5/5  Triceps:                     Right:  5/5   Left:  5/5  Wrist Extensors:       Right:  5/5   Left:  5/5  Wrist Flexors:           Right:  5/5   Left:  5/5  Intrinsics:                   Right:  5/5   Left:  5/5      Sensation normal to bilateral upper extremities.    Reflexes are 2+ in the brachial radialis and triceps. Negative Dennis sign bilaterally.    Musculoskeletal:  Gait: Able to stand from a seated position. Normal non-antalgic, non-myelopathic gait.  Able to heel/toe walk without loss of balance  Cervical examination reveals good range of motion, but increased pain with rotation bilaterally.  No tenderness to palpation of the cervical spine. Mild tenderness to trapezius on the right.    Imaging:   MRI of the cervical spine from 12/22/2019 was reviewed in the office today.     IMPRESSION:    1. Central disc extrusion at C6-C7 contributing " to moderate spinal canal stenosis.  2. Right central disc protrusion at C5-C6 contributing to mild spinal canal stenosis.    Assessment/Plan:   Babita Hartley is a 34 year old female who presents for evaluation of neck and right arm pain     Will try JUAN  Will start a course of PT after JUAN    Again, thank you for allowing me to participate in the care of your patient.        Sincerely,        Ashwin Resendiz MD

## 2020-01-10 NOTE — NURSING NOTE
"Babita Hartley is a 34 year old female who presents for:  Chief Complaint   Patient presents with     Follow Up        Initial Vitals:  /77   Pulse 91   Temp 97.7  F (36.5  C) (Oral)   Resp 16   Ht 5' 4\" (1.626 m)   Wt 123 lb (55.8 kg)   SpO2 99%   BMI 21.11 kg/m   Estimated body mass index is 21.11 kg/m  as calculated from the following:    Height as of this encounter: 5' 4\" (1.626 m).    Weight as of this encounter: 123 lb (55.8 kg).. Body surface area is 1.59 meters squared. BP completed using cuff size: regular  Moderate Pain (5)    Nursing Comments: Pt present today for follow up.    Tae Aguayo CMA    "

## 2020-01-10 NOTE — PROGRESS NOTES
Babita Hartley is a 34 year old female who presents for evaluation of neck and right arm pain x 3-4 weeks. Pain is located in right neck and radiates down right arm to the forearm and also radiates down toward right flank. Describes the pain as sharp and intermittent. She does get paresthesias associated in this pattern as well. Cannot recall anything that worsens/alleviates the pain. She has tried oxycodone and flexeril, without relief. Ibuprofen provides mild relief. No recent PT or injections. Denies bladder/bowel incontinence.    She returns today for follow up.  Continued, episodic, sharp right arm pain radiating from neck to first 3 digits, several times a week.  9/10, severe pain when it comes on.  Over 2 months of duration, no exacerbating factors.  MR with right C5-6 foraminal extruded disc herniation and C6-7 central herniation.    Past Medical History:   Diagnosis Date     Anxiety      ASCUS of cervix with negative high risk HPV 10/02/2017    10/2/17 ASCUS, Neg HPV     Constipation      Ectopic pregnancy 9/26/11     Thyroid disease     hypothyroidism       Past Medical History reviewed with patient during visit.    Past Surgical History:   Procedure Laterality Date     BREAST SURGERY      breast reduction surgery     ECTOPIC PREGNANCY SURGERY       LAPAROSCOPIC SALPINGOTOMY  9/26/11    right for ectopic     Past Surgical History reviewed with patient during visit.    Current Outpatient Medications   Medication     levothyroxine (SYNTHROID/LEVOTHROID) 125 MCG tablet     methylPREDNISolone (MEDROL DOSEPAK) 4 MG tablet therapy pack     spironolactone (ALDACTONE) 100 MG tablet     amphetamine-dextroamphetamine (ADDERALL) 15 MG tablet     diazepam (VALIUM) 2 MG tablet     oxyCODONE (ROXICODONE) 5 MG tablet     traZODone (DESYREL) 50 MG tablet     tretinoin (RETIN-A) 0.025 % cream     zolpidem (AMBIEN) 10 MG tablet     No current facility-administered medications for this visit.        Allergies   Allergen  Reactions     Weed [Grass] Other (See Comments)     Nasal congestion, rhinitis       Social History     Socioeconomic History     Marital status: Single     Spouse name: Not on file     Number of children: Not on file     Years of education: Not on file     Highest education level: Not on file   Occupational History     Not on file   Social Needs     Financial resource strain: Not on file     Food insecurity:     Worry: Not on file     Inability: Not on file     Transportation needs:     Medical: Not on file     Non-medical: Not on file   Tobacco Use     Smoking status: Never Smoker     Smokeless tobacco: Never Used   Substance and Sexual Activity     Alcohol use: Yes     Comment: Once a week     Drug use: No     Sexual activity: Yes     Partners: Male   Lifestyle     Physical activity:     Days per week: Not on file     Minutes per session: Not on file     Stress: Not on file   Relationships     Social connections:     Talks on phone: Not on file     Gets together: Not on file     Attends Protestant service: Not on file     Active member of club or organization: Not on file     Attends meetings of clubs or organizations: Not on file     Relationship status: Not on file     Intimate partner violence:     Fear of current or ex partner: Not on file     Emotionally abused: Not on file     Physically abused: Not on file     Forced sexual activity: Not on file   Other Topics Concern     Parent/sibling w/ CABG, MI or angioplasty before 65F 55M? Not Asked   Social History Narrative     Not on file       Family History   Problem Relation Age of Onset     Hypertension Father      Genitourinary Problems Father         Kidney stones     Lipids Father      Thyroid Disease Father      Cerebrovascular Disease Paternal Grandfather      Cancer Paternal Grandmother         Pancreatic cancer     Cardiovascular Maternal Grandfather         Heart attack     Thyroid Disease Mother      Breast Cancer No family hx of      Colon Cancer No  "family hx of           ROS: 10 point ROS neg other than the symptoms noted above in the HPI.    Vital Signs: /77   Pulse 91   Temp 97.7  F (36.5  C) (Oral)   Resp 16   Ht 1.626 m (5' 4\")   Wt 55.8 kg (123 lb)   SpO2 99%   BMI 21.11 kg/m      Examination:  Constitutional:  Alert, well nourished, NAD.  HEENT: Normocephalic, atraumatic.   Pulmonary:  Without shortness of breath, normal effort.   Lymph: no lymphadenopathy to low back or LE.   Integumentary: Skin is free of rashes or lesions.   Cardiovascular:  No pitting edema of BLE.      Neurological:  Awake  Alert  Oriented x 3  Speech clear  Cranial nerves II - XII grossly intact  PERRL  EOMI  Face symmetric  Tongue midline  Motor exam   Shoulder Abduction:  Right:  5/5   Left:  5/5  Biceps:                      Right:  5/5   Left:  5/5  Triceps:                     Right:  5/5   Left:  5/5  Wrist Extensors:       Right:  5/5   Left:  5/5  Wrist Flexors:           Right:  5/5   Left:  5/5  Intrinsics:                   Right:  5/5   Left:  5/5      Sensation normal to bilateral upper extremities.    Reflexes are 2+ in the brachial radialis and triceps. Negative Dennis sign bilaterally.    Musculoskeletal:  Gait: Able to stand from a seated position. Normal non-antalgic, non-myelopathic gait.  Able to heel/toe walk without loss of balance  Cervical examination reveals good range of motion, but increased pain with rotation bilaterally.  No tenderness to palpation of the cervical spine. Mild tenderness to trapezius on the right.    Imaging:   MRI of the cervical spine from 12/22/2019 was reviewed in the office today.     IMPRESSION:    1. Central disc extrusion at C6-C7 contributing to moderate spinal canal stenosis.  2. Right central disc protrusion at C5-C6 contributing to mild spinal canal stenosis.    Assessment/Plan:   Babita Hartley is a 34 year old female who presents for evaluation of neck and right arm pain     Will try JUAN  Will start a " course of PT after JUAN

## 2020-01-13 ENCOUNTER — TELEPHONE (OUTPATIENT)
Dept: PALLIATIVE MEDICINE | Facility: CLINIC | Age: 35
End: 2020-01-13

## 2020-01-13 NOTE — TELEPHONE ENCOUNTER
Chart review: Patient prescribed medrol dose pack 01/10/20.   Called both numbers listed to discuss medrol dose pack, if began then likely will not be completed with the 4 day window needed for injection on 01/17/20    Unable to leave  and number listed as mail box full  Nursing to attempt to reach at later time    Madeleine MARTINI, RN Care Coordinator  [facility]  Pain Management

## 2020-01-13 NOTE — TELEPHONE ENCOUNTER
Pre-screening Questions for Radiology Injections:    Injection to be done at which interventional clinic site? [facility] [place]    Instruct patient to arrive as directed prior to the scheduled appointment time:    [place] minutes before      [place]: 30 minutes before; if IV needed 1 hour before     Procedure ordered by Dr. Resendiz    Procedure ordered? C6-7 JUAN       Transforaminal Cervical JUAN - Dr. Karlee Goldberg ONLY    What insurance would patient like us to bill for this procedure? Pref. One       Worker's comp or MVA (motor vehicle accident) -Any injection DO NOT SCHEDULE and route to Paulette Wayne.      [employer] insurance - For SI joint injections, DO NOT SCHEDULE and route Paulette Wayne.       [employer] - Any injection besides hip/shoulder/knee joint DO NOT SCHEDULE and route to Paulette Wayne. She will obtain PA and call pt back to schedule procedure or notify pt of denial.       [employer]-Route to [place] for review      **BCBS- ALL need to be routed to [place] for review if a PA is needed**      IF SCHEDULING IN [place] AND NEEDS A PA, IT IS OKAY TO SCHEDULE. [place] HANDLES THEIR OWN PA'S AFTER THE PATIENT IS SCHEDULED. PLEASE SCHEDULE AT LEAST 1 WEEK OUT SO A PA CAN BE OBTAINED.    Any chance of pregnancy? NO   If YES, do NOT schedule and route to RN pool    Is an  needed? No     Patient has a drive home? (mandatory) YES: informed     Is patient taking any blood thinners (i.e. plavix, coumadin, jantoven, warfarin, heparin, pradaxa or dabigatran, etc)? No   If hold needed, do NOT schedule, route to RN pool     Is patient taking any aspirin products (includes Excedrin and Fiorinal)? No     If more than 325mg/day do NOT schedule; route to RN pool     For CERVICAL procedures, hold all aspirin products for 6 days.     Tell pt that if aspirin product is not held for 6 days, the procedure WILL BE cancelled.      Does the patient have a bleeding or clotting disorder? No     If YES, okay to schedule  AND route to RN nurse pool    For any patients with platelet count <100, must be forwarded to provider    Is patient diabetic?  No  If YES, instruct them to bring their glucometer.    Does patient have an active infection or treated for one within the past week? No     Is patient currently taking any antibiotics?  No     For patients on chronic, preventative, or prophylactic antibiotics, procedures may be scheduled.     For patients on antibiotics for active or recent infection:antibiotic course must have been completed for 4 days    Is patient currently taking any steroid medications? (i.e. Prednisone, Medrol)  No     For patients on steroid medications, course must have been completed for 4 days    Reviewed with patient:  If you are started on any steroids or antibiotics between now and your appointment, you must contact us because the procedure may need to be cancelled.  Yes    Is patient actively being treated for cancer or immunocompromised? No  If YES, do NOT schedule and route to RN pool     Are you able to get on and off an exam table with minimal or no assistance? Yes  If NO, do NOT schedule and route to RN pool    Are you able to roll over and lay on your stomach with minimal or no assistance? Yes  If NO, do NOT schedule and route to RN pool     Any allergies to contrast dye, iodine, shellfish, or numbing and steroid medications? No  If YES, route to RN pool AND add allergy information to appointment notes    Allergies: Weed [grass]      Has the patient had a flu shot or any other vaccinations within 7 days before or after the procedure.  No     Does patient have an MRI/CT?  YES: 2019  Check Procedure Scheduling Grid to see if required.      Was the MRI done within the last 3 years?  Yes    If yes, [facility], [place], [facility] etc?       If no, do not schedule and route to RN pool    If MRI was not done at [place], [facility] or [facility] do NOT schedule  and route to RN pool.      If pt has an imaging disc, the injection MAY be scheduled but pt has to bring disc to appt.     If they show up without the disc the injection cannot be done    Reminders (please tell patient if applicable):       Instructed pt to arrive 30 minutes early for IV start if required. (Check Procedure Scheduling Grid)  Not Applicable      If celiac plexus block, informed patient NPO for 6 hours and that it is okay to take medications with sips of water, especially blood pressure medications  Not Applicable         If this is for a cervical procedure, informed patient that aspirin needs to be held for 6 days.   Not Applicable      For all patients not having spinal cord stimulator (SCS) trials or radiofrequency ablations (RFAs), informed patient:    IV sedation is not provided for this procedure.  If you feel that an oral anti-anxiety medication is needed, you can discuss this further with your referring provider or primary care provider.  The Pain Clinic provider will discuss specifics of what the procedure includes at your appointment.  Most procedures last 10-20 minutes.  We use numbing medications to help with any discomfort during the procedure.  Not Applicable      Do not schedule procedures requiring IV placement in the first appointment of the day or first appointment after lunch. Do NOT schedule at 0745, 0815 or 1245.       For patients 85 or older we recommend having an adult stay w/ them for the remainder of the day.       Does the patient have any questions?  NO  Chanelle Salvador  [facility]

## 2020-01-17 ENCOUNTER — HOSPITAL ENCOUNTER (OUTPATIENT)
Dept: GENERAL RADIOLOGY | Facility: CLINIC | Age: 35
End: 2020-01-17
Attending: PHYSICAL MEDICINE & REHABILITATION | Admitting: PHYSICAL MEDICINE & REHABILITATION
Payer: COMMERCIAL

## 2020-01-17 ENCOUNTER — HOSPITAL ENCOUNTER (OUTPATIENT)
Facility: CLINIC | Age: 35
Discharge: HOME | End: 2020-01-17
Attending: PHYSICAL MEDICINE & REHABILITATION | Admitting: PHYSICAL MEDICINE & REHABILITATION
Payer: COMMERCIAL

## 2020-01-17 VITALS — HEART RATE: 88 BPM | SYSTOLIC BLOOD PRESSURE: 133 MMHG | DIASTOLIC BLOOD PRESSURE: 75 MMHG | RESPIRATION RATE: 16 BRPM

## 2020-01-17 VITALS — HEART RATE: 90 BPM | SYSTOLIC BLOOD PRESSURE: 112 MMHG | DIASTOLIC BLOOD PRESSURE: 70 MMHG | OXYGEN SATURATION: 100 %

## 2020-01-17 DIAGNOSIS — M54.12 CERVICAL RADICULOPATHY: ICD-10-CM

## 2020-01-17 PROCEDURE — 25500064 ZZH RX 255 OP 636: Performed by: PHYSICAL MEDICINE & REHABILITATION

## 2020-01-17 PROCEDURE — 62321 NJX INTERLAMINAR CRV/THRC: CPT | Mod: TC

## 2020-01-17 PROCEDURE — 25000128 H RX IP 250 OP 636: Performed by: PHYSICAL MEDICINE & REHABILITATION

## 2020-01-17 PROCEDURE — 40000863 ZZH STATISTIC RADIOLOGY XRAY, US, CT, MAR, NM

## 2020-01-17 PROCEDURE — 25000125 ZZHC RX 250: Performed by: PHYSICAL MEDICINE & REHABILITATION

## 2020-01-17 RX ORDER — TRIAMCINOLONE ACETONIDE 40 MG/ML
40 INJECTION, SUSPENSION INTRA-ARTICULAR; INTRAMUSCULAR ONCE
Status: COMPLETED | OUTPATIENT
Start: 2020-01-17 | End: 2020-01-17

## 2020-01-17 RX ORDER — IOPAMIDOL 408 MG/ML
10 INJECTION, SOLUTION INTRATHECAL ONCE
Status: COMPLETED | OUTPATIENT
Start: 2020-01-17 | End: 2020-01-17

## 2020-01-17 RX ADMIN — TRIAMCINOLONE ACETONIDE 40 MG: 40 INJECTION, SUSPENSION INTRA-ARTICULAR; INTRAMUSCULAR at 11:05

## 2020-01-17 RX ADMIN — IOPAMIDOL 2 ML: 408 INJECTION, SOLUTION INTRATHECAL at 11:07

## 2020-01-17 RX ADMIN — LIDOCAINE HYDROCHLORIDE 3 ML: 10 INJECTION, SOLUTION EPIDURAL; INFILTRATION; INTRACAUDAL; PERINEURAL at 11:07

## 2020-01-17 NOTE — PROGRESS NOTES
Care Suites Discharge Nursing Note    Education/questions answered: Reviewed discharge instructions. Questions answered. Pt accepts and understands.  Patient DC location: Home  Accompanied by: Pt's mom is picking her up.  CS discharge time: 1130

## 2020-01-17 NOTE — PROVIDER NOTIFICATION
VSTaken at 1115 not 1100.      Care Suites Post-Procedure Note    Procedure: Cervical JUAN  CS arrival time: 1115  Accompanied by: Lana BAY  Concerns/abnormal assessment after procedure: VSS. Pain in right back/shoulder with some radiation to right arm about a 4/10. Dressing to upper spine CDI. Taking PO.   Plan: Discharge after reviewing discharge instructions.

## 2020-01-17 NOTE — TELEPHONE ENCOUNTER
Appointment note added as unable to contact patient    Madeleine MARTINI, RN Care Coordinator  [facility]  [facility]

## 2020-01-17 NOTE — DISCHARGE INSTRUCTIONS
[facility] Procedure Discharge Instructions    Today you saw:   Dr Peter Maldonado    Procedure: Cervical Epidural Steroid Injection     Medications used:  Lidocaine  -  Kenalog - Isovue M200  -Normal saline    After you go home:      You may resume your normal diet        Care of Puncture Site:      If you have a bandaid on your puncture site, you may remove it the next morning    You may shower     No bath tubs, whirlpools or swimming for 24 hours after your procedure     Activity:      You may go back to normal activity in 24 hours    Avoid strenuous activity for the first 24 hours.    Do not drive for 6 hours.  The effect of the numbing medication could slow your reflexes.    Medicines:      You may resume all medications    For minor pain, you may use anti-inflammatory medications - (such as Ibuprofen, Aleve, Advil) or Acetaminophen (Tylenol) for pain control if necessary.    Pain:       You may have a mild to moderate increase in pain for several days following the injection.    It may take up to 14 days for the steroid medication to start working although you may feel the effect as early as a few days after the procedure.    You may use ice packs for 10-15 minutes, 3 to 4 times a day at the injection site for comfort.    Do not use heat to painful areas for 6 to 8 hours.  This will give the numbing medication time to wear off and prevent you from accidentally burning your skin.    Call the Pain Clinic if you experience any of the following:      You have chills or a fever greater than 100 F     Swelling, bleeding, redness, drainage, warmth at the injection site    Progressive weakness or numbness in your legs or arms    If lumbar, call if you have a loss of bowel or bladder function    If cervical, call if you have any unusual headache that is not relieved by Tylenol or other pain medication    Unusual new onset of pain that is not improving    Other Instructions:      If you are  diabetic, check your blood sugar more frequently than usual as your blood sugar may be higher than normal for 10-14 days following a steroid injection.  Contact the provider who manages your diabetes to help you control your blood sugar if needed.      If you have questions call:        Pain Clinic @ [phone] during business hours  Monday-Thursday 8 AM-5:30 PM and Friday 8 AM-4:30 PM    Provider Line @ [phone] after hours

## 2020-01-24 ENCOUNTER — MYC MEDICAL ADVICE (OUTPATIENT)
Dept: NEUROSURGERY | Facility: CLINIC | Age: 35
End: 2020-01-24

## 2020-01-24 DIAGNOSIS — M54.12 CERVICAL RADICULOPATHY: Primary | ICD-10-CM

## 2020-01-24 RX ORDER — METHOCARBAMOL 750 MG/1
750 TABLET, FILM COATED ORAL 4 TIMES DAILY PRN
Qty: 30 TABLET | Refills: 1 | Status: SHIPPED | OUTPATIENT
Start: 2020-01-24 | End: 2020-02-10

## 2020-01-27 ENCOUNTER — MYC MEDICAL ADVICE (OUTPATIENT)
Dept: FAMILY MEDICINE | Facility: CLINIC | Age: 35
End: 2020-01-27

## 2020-01-30 ENCOUNTER — MYC MEDICAL ADVICE (OUTPATIENT)
Dept: FAMILY MEDICINE | Facility: CLINIC | Age: 35
End: 2020-01-30

## 2020-01-31 NOTE — TELEPHONE ENCOUNTER
PCP,    Pt would like to schedule telephone visit at 12 today for FMLA.  OK to schedule?    Thank you,  Xavier KIRBY RN

## 2020-01-31 NOTE — TELEPHONE ENCOUNTER
TO PCP:     See below, pt asking for you to complete FMLA paperwork for bulging discs without OV     Last OV 12/16/19 for cervical radicular pain - does she need OV/phone visit to discuss?     Please advise     Camille CARTER RN

## 2020-02-07 ENCOUNTER — TELEPHONE (OUTPATIENT)
Dept: NEUROLOGY | Facility: CLINIC | Age: 35
End: 2020-02-07

## 2020-02-07 NOTE — TELEPHONE ENCOUNTER
Dr. Kuhn would like to see this referral on 2/10 either between 8-10 am or after [facility].    Patient's voice mail box was full and therefore I could not leave a message.    I tried calling the mother's number but the number is invalid. I will send a MyChart.    Authorization to schedule this appointment was given by Tanika Lucas.

## 2020-02-07 NOTE — TELEPHONE ENCOUNTER
[facility] Call Center    Phone Message    May a detailed message be left on voicemail: yes     Reason for Call: Other: Babita calling back and would be available at 9am on Monday 2/10.  She was also made aware of her voice mail being full and her mother's number is updated     Action Taken: Message routed to:  [facility] ([facility]  Neurology    Travel Screening: Not Applicable

## 2020-02-10 ENCOUNTER — PRE VISIT (OUTPATIENT)
Dept: NEUROLOGY | Facility: CLINIC | Age: 35
End: 2020-02-10

## 2020-02-10 ENCOUNTER — OFFICE VISIT (OUTPATIENT)
Dept: NEUROLOGY | Facility: CLINIC | Age: 35
End: 2020-02-10
Payer: COMMERCIAL

## 2020-02-10 VITALS
SYSTOLIC BLOOD PRESSURE: 108 MMHG | OXYGEN SATURATION: 100 % | DIASTOLIC BLOOD PRESSURE: 75 MMHG | HEART RATE: 78 BPM | WEIGHT: 124.3 LBS | BODY MASS INDEX: 21.34 KG/M2

## 2020-02-10 DIAGNOSIS — M79.621 PAIN OF RIGHT UPPER ARM: Primary | ICD-10-CM

## 2020-02-10 RX ORDER — SPIRONOLACTONE 100 MG/1
TABLET, FILM COATED ORAL
COMMUNITY
Start: 2020-01-21 | End: 2020-03-24

## 2020-02-10 SDOH — HEALTH STABILITY: MENTAL HEALTH: HOW MANY STANDARD DRINKS CONTAINING ALCOHOL DO YOU HAVE ON A TYPICAL DAY?: 1 OR 2

## 2020-02-10 SDOH — HEALTH STABILITY: MENTAL HEALTH: HOW OFTEN DO YOU HAVE A DRINK CONTAINING ALCOHOL?: 2-4 TIMES A MONTH

## 2020-02-10 ASSESSMENT — PAIN SCALES - GENERAL: PAINLEVEL: MILD PAIN (3)

## 2020-02-10 NOTE — LETTER
2/10/2020       RE: Babita Hartley  [address]  [place] [facility] [address]     Dear Colleague,    Thank you for referring your patient, Babita Hartley, to the [facility] [place] [facility] at [facility]. Please see a copy of my visit note below.    Department of Neurology  Movement Disorders Division   Initial Clinic Evaluation     Patient: Babita Hartley   MRN: [id number]   : 1985   Date of Visit: 2/10/2020     Referring Physician: Devaughn Hartley MD    Reason for Referral: Right neck and shoulder pain    Impression:  1.  Right neck and shoulder pain    In terms of localization I think this is most likely a cervical radiculopathy.  The findings are most consistent with a right C6 and possibly C5 radiculopathy.  Her MRI shows disc disease at these levels but no clear posterior lateral extension into the proximal neural foramen.  However I wonder about a far lateral disc perhaps at the C6-7 level.  Her neck pain is minimal and the shoulder/arm pain is severe.  This could also be a brachial plexopathy.  However her problems seem confined to a single root.  I think this would be less likely.  If these were mononeuropathies it would have to be axillary and musculocutaneous which seems unlikely.    The first step is to localize the process.  Her pain is gone on for 2 months.  I think an EMG should localize her source of problems.    I went over the images and the thinking with Babita.  I explained to her that the next logical step is to do an EMG for localization.  She is in agreement.    Recommendations:  1.  EMG  2.  Discuss results with the patient and plan next steps.  I reassured Babita that we would work rapidly to try to relieve her excruciating pain.  Until then I recommended regular acetaminophen 500 mg 2 tablets 2 or 3 times a day on a regular schedule.    Please call or write with questions or concerns,    Sincerely yours,    Camden Kuhn MD ,  MD      History of Present Illness  Ms. Hartley is a 34 year old female who comes for evaluation of right neck and shoulder pain.  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    The patient has had no previous neck injury or shoulder pain.  She has sustained no injury to her head neck or shoulder.  There is been no excessive strain or fall.    She had a flu shot did not October she believes but had no reaction to this.    Through the month of November she believes she was having some right shoulder pain.  She would be taking ibuprofen for this.  It was not of great concern at that time.  The pain would radiate to the suprascapular region and into the deltoid.  She was surprised at one point where she noted she had gone through a whole bottle of ibuprofen rather quickly.  The pain seemed to be worse after long surgical days.    On December 4 while driving home she began to have numbness and tingling down her entire right arm.  This was accompanied by pain into the deltoid and upper arm.  The maximal area of numbness was in digits 1 through 3.  This alarmed her and she went to the emergency room.  There x-rays were done.  These revealed a reversal of cervical lordosis.  There was loss of disc space height between C5-6.  There was an anterior osteophyte seen.    The pain became more severe.  The patient saw Dr. Evans her primary care physician.  It was felt the patient had a cervical radiculopathy.  The patient was prescribed a Medrol Dosepak and pain relief.  She elected not to take the Medrol Dosepak.  The pain persisted an MRI of the cervical spine was performed.  This was on December 22 of 2019.  I reviewed the scan with the patient.  The scan shows significant disc disease at C5-6 and C6-7.  There is central disc herniations.  This is maximal at C6-7 where it obliterates the anterior thecal sac and does cause some mild cord impression more on the left than the right.  There is no significant  intramedullary signal change.  I also think at the C6-7 level there may be some asymmetry of the nerve root exit sleeves.    The patient saw Dr. Resendiz in neurosurgery.  Dr. Resendiz did not find any abnormalities on neurological examination.  On January 17 of 2020 the patient underwent an epidural steroid injection.  I looked over the notes and the level is not described.  Looking at the image there was flow of contrast across multiple segments of the epidural space.  The injection gave no benefit and perhaps the pain was worse after this.    The patient has had persistent severe excruciating pain.  It is a deep aching pain.  When it occurs she cannot get comfortable.  The pain is perhaps not so constant as it was in the past.  It is intermittent.  It tends to be the worse after her long surgical day doing Mohs surgery with her dermatology practitioner.  There she is standing for most of the day.  When she goes home she is in severe pain which last through the next day.  She also gets pain in the evening time and night which makes it impossible for her to sleep.  When severe the pain is definitely 10/10.  The pain at present is in the right paracervical region radiating to the right suprascapular region and down into the right upper arm.  It does not go below the upper arm at this time.  However there is definite paresthesia that extend down into the hand.  The patient does not notice that the pain is worse with neck movement.  The patient tried taking oxycodone which caused intolerable nausea.  Valium helps a bit.  Robaxin caused rash.  Flexeril was of no benefit.    The patient does have some weakness in her right arm.  She has difficulty holding the right arm up over her head when she blow dries her hair.  The pain is now becoming more diffuse and extending down into the torso region.  She denies Lhermitte's phenomena.  She denies pain in the left arm.  There is no gait or bowel or bladder difficulties.    She states  that others have begun to notice that she has asymmetry of her scapula.  The right scapula seems to lie flat against her torso.  The left scapula seems to protrude a bit which she regards is normal position.    Neurological exam is recorded below but in summary these are the findings:    There is weakness 4 out of 5 of the right deltoid without atrophy.  There is trace weakness 4+ out of 5 of the right infraspinatus, right biceps and right brachial radialis.  There is no other weakness of the arm.  The right supinator is strong.  All other muscles are strong including the flexor pollicis longus.  There is trace reduction of reflex in the right brachioradialis.  There is slight loss of pinprick sensation over the second digit.  The right scapula does lie flatter against the torso than the left.  There is no winging of the scapula.  I think there is may be some slight atrophy of the right supraspinatus muscle.    Past Medical History:   Past Medical History:   Diagnosis Date     Anxiety      ASCUS of cervix with negative high risk HPV 10/02/2017    10/2/17 ASCUS, Neg HPV     Constipation      Ectopic pregnancy 9/26/11     Thyroid disease     hypothyroidism       Past Surgical History:   Past Surgical History:   Procedure Laterality Date     BREAST SURGERY      breast reduction surgery     ECTOPIC PREGNANCY SURGERY       LAPAROSCOPIC SALPINGOTOMY  9/26/11    right for ectopic       Medications:  Current Outpatient Medications   Medication Sig Dispense Refill     amphetamine-dextroamphetamine (ADDERALL) 15 MG tablet Take 1 tablet (15 mg) by mouth 2 times daily 60 tablet 0     levothyroxine (SYNTHROID/LEVOTHROID) 125 MCG tablet Take 1 tablet (125 mcg) by mouth daily 90 tablet 1     spironolactone (ALDACTONE) 100 MG tablet Take 1 tablet (100 mg) by mouth daily 90 tablet 4     methocarbamol (ROBAXIN) 750 MG tablet Take 1 tablet (750 mg) by mouth 4 times daily as needed for muscle spasms (Patient not taking: Reported on  2/10/2020) 30 tablet 1     methylPREDNISolone (MEDROL DOSEPAK) 4 MG tablet therapy pack Follow Package Directions (Patient not taking: Reported on 2/10/2020) 21 tablet 0     oxyCODONE (ROXICODONE) 5 MG tablet Take 1-2 tablets (5-10 mg) by mouth every 8 hours as needed for severe pain (Patient not taking: Reported on 2/10/2020) 15 tablet 0     traZODone (DESYREL) 50 MG tablet Take 1.5 tablets (75 mg) by mouth At Bedtime (Patient not taking: Reported on 2/10/2020) 45 tablet 3     zolpidem (AMBIEN) 10 MG tablet Take 1 tablet (10 mg) by mouth nightly as needed for sleep (Patient not taking: Reported on 2/10/2020) 15 tablet 1          Movement Disorder-related Medications                                                                                                                                                             Allergies: is allergic to weed [grass].    Social History:   Social History     Socioeconomic History     Marital status: Single     Spouse name: None     Number of children: None     Years of education: None     Highest education level: None   Occupational History     None   Social Needs     Financial resource strain: None     Food insecurity:     Worry: None     Inability: None     Transportation needs:     Medical: None     Non-medical: None   Tobacco Use     Smoking status: Never Smoker     Smokeless tobacco: Never Used   Substance and Sexual Activity     Alcohol use: Yes     Frequency: 2-4 times a month     Drinks per session: 1 or 2     Comment: Once a week     Drug use: No     Sexual activity: Yes     Partners: Male   Lifestyle     Physical activity:     Days per week: None     Minutes per session: None     Stress: None   Relationships     Social connections:     Talks on phone: None     Gets together: None     Attends Christian service: None     Active member of club or organization: None     Attends meetings of clubs or organizations: None     Relationship status: None     Intimate partner  violence:     Fear of current or ex partner: None     Emotionally abused: None     Physically abused: None     Forced sexual activity: None   Other Topics Concern     Parent/sibling w/ CABG, MI or angioplasty before 65F 55M? Not Asked   Social History Narrative     None       Family History:  Family History   Problem Relation Age of Onset     Hypertension Father      Genitourinary Problems Father         Kidney stones     Lipids Father      Thyroid Disease Father      Cerebrovascular Disease Paternal Grandfather      Cancer Paternal Grandmother         Pancreatic cancer     Cardiovascular Maternal Grandfather         Heart attack     Thyroid Disease Mother      Breast Cancer No family hx of      Colon Cancer No family hx of        ROS:    Neurologic  Visual loss: no, Double vision: no, Headache: no, Loss of consciousness:  no, Seizure: no, Fainting (syncope): no, Dysarthria: no, Dysphagia:  no, Vertigo:  no, Weakness: YES, Atrophy: no, Twitches: no, Lhermitte's: no, Numbness or tingling: YES, Handwriting change: no, Tremors: no, Involuntary movements: no, Imbalance: no, Abnormal gait:  no, Falls :no, Memory loss: no, RBD: no, Sleep disorder: no, Hallucination: no, Loss of concentration: no,  Behavioral change: no,  Loss of motivation: no      Comprehensive Neurologic Exam    Mental Status Exam   The patient is alert, oriented and exhibits no difficulty with cognition or memory.  A formal short test of mental status was performed.     Neurovascular         Speech and Language   Right Left     Carotid Bruit Absent Absent  Speech is normal.   Dorsalis Pedis Pulse Normal Normal  Description   Posterior Tibial Pulse Normal Normal  Language is normal.     Cranial Nerve Exam                 Right Left   Right Left   II                                        Normal Normal  Hearing (VIII) Normal Normal      III, IV, V!                   Normal Normal  Nystagmus (VIII) Normal Normal   EOM description                               Gag (IX, X) Normal Normal   Facial Sensation (V) Normal Normal  SCM (XI) Normal Normal   Muscles of Mastication (V) Normal Normal  Trapezius (XI) Normal Normal   Facial Strength (VII) Normal Normal  Tongue (XII) Normal Normal     Motor Exam  Strength (*/5 grading)  Muscle                  Right Left  Muscle Right Left   Frontalis                                           Normal Normal  Iliopsoas Normal    Normal          Orbicularis Oculi                     Normal Normal  Quadrideps Normal    Normal        Orbicularis Oris                         Normal Normal  Anterior Tibial Normal Normal      Deltoid 4 Normal  Gastrocnemius Normal    Normal   Biceps 4+ Normal  Extensor Hallucis Longus Normal    Normal   Triceps Normal Normal  Toe Extensors Normal    Normal   EDC Normal Normal  Toe Flexor Normal    Normal   Finger Flexors Normal Normal  Brachioradialis 4+ Normal   First Dorsal Interosseous Normal Normal  Supinator Normal Normal   Hypothenar Normal Normal  Flexor Pollicis Longus Normal Normal   Thenar Normal Normal  Other Normal Normal     Reflexes      Tone   Right Left   Right Left   Biceps Normal Normal  Spasticity (S)  Rigidity (R)     Triceps -1 Normal  Neck     Brachioradialis -1 Normal  Arm     Quadriceps Normal Normal  Leg     Ankle Normal Normal       Babkinski Flexor Flexor         AMR       Coordination   Right Left   Right Left   Fingers Normal Normal  Finger nose finger Normal Normal   Hand Normal Normal  Drift Normal Normal   Leg Normal Normal  Heel Pena Normal Normal   Foot Normal Normal  Other     Other             Involuntary Movements    Gait and Station  None            Right Left  Stand & Sit Normal   (Movement type) Normal Normal  Gait Normal   (Movement type) Normal Normal  Tandem Normal   (Movement type) Normal Normal  Romberg Absent       Sensory Exam          Right Left    Pin Normal Normal    Vibration Normal Normal    Joint position Normal Normal    Other          Coding statement:      Duration of  Services: face-to-face 45 min.   Greater than 50% of this visit was spent in counseling and coordination of care.      Again, thank you for allowing me to participate in the care of your patient.      Sincerely,    Camden Kuhn MD

## 2020-02-10 NOTE — PROGRESS NOTES
Department of Neurology  Movement Disorders Division   Initial Clinic Evaluation     Patient: Babita Hartley   MRN: [id number]   : 1985   Date of Visit: 2/10/2020     Referring Physician: Devaughn Hartley MD    Reason for Referral: Right neck and shoulder pain    Impression:  1.  Right neck and shoulder pain    In terms of localization I think this is most likely a cervical radiculopathy.  The findings are most consistent with a right C6 and possibly C5 radiculopathy.  Her MRI shows disc disease at these levels but no clear posterior lateral extension into the proximal neural foramen.  However I wonder about a far lateral disc perhaps at the C6-7 level.  Her neck pain is minimal and the shoulder/arm pain is severe.  This could also be a brachial plexopathy.  However her problems seem confined to a single root.  I think this would be less likely.  If these were mononeuropathies it would have to be axillary and musculocutaneous which seems unlikely.    The first step is to localize the process.  Her pain is gone on for 2 months.  I think an EMG should localize her source of problems.    I went over the images and the thinking with Babita.  I explained to her that the next logical step is to do an EMG for localization.  She is in agreement.    Recommendations:  1.  EMG  2.  Discuss results with the patient and plan next steps.  I reassured Babita that we would work rapidly to try to relieve her excruciating pain.  Until then I recommended regular acetaminophen 500 mg 2 tablets 2 or 3 times a day on a regular schedule.      Please call or write with questions or concerns,    Sincerely yours,    Camden Kuhn MD , MD      History of Present Illness  Ms. Hartley is a 34 year old female who comes for evaluation of right neck and shoulder pain.  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    The patient has had no previous neck injury or shoulder pain.  She has sustained no injury to  her head neck or shoulder.  There is been no excessive strain or fall.    She had a flu shot did not October she believes but had no reaction to this.    Through the month of November she believes she was having some right shoulder pain.  She would be taking ibuprofen for this.  It was not of great concern at that time.  The pain would radiate to the suprascapular region and into the deltoid.  She was surprised at one point where she noted she had gone through a whole bottle of ibuprofen rather quickly.  The pain seemed to be worse after long surgical days.    On December 4 while driving home she began to have numbness and tingling down her entire right arm.  This was accompanied by pain into the deltoid and upper arm.  The maximal area of numbness was in digits 1 through 3.  This alarmed her and she went to the emergency room.  There x-rays were done.  These revealed a reversal of cervical lordosis.  There was loss of disc space height between C5-6.  There was an anterior osteophyte seen.    The pain became more severe.  The patient saw Dr. Evans her primary care physician.  It was felt the patient had a cervical radiculopathy.  The patient was prescribed a Medrol Dosepak and pain relief.  She elected not to take the Medrol Dosepak.  The pain persisted an MRI of the cervical spine was performed.  This was on December 22 of 2019.  I reviewed the scan with the patient.  The scan shows significant disc disease at C5-6 and C6-7.  There is central disc herniations.  This is maximal at C6-7 where it obliterates the anterior thecal sac and does cause some mild cord impression more on the left than the right.  There is no significant intramedullary signal change.  I also think at the C6-7 level there may be some asymmetry of the nerve root exit sleeves.    The patient saw Dr. Resendiz in neurosurgery.  Dr. Resendiz did not find any abnormalities on neurological examination.  On January 17 of 2020 the patient underwent an epidural  steroid injection.  I looked over the notes and the level is not described.  Looking at the image there was flow of contrast across multiple segments of the epidural space.  The injection gave no benefit and perhaps the pain was worse after this.    The patient has had persistent severe excruciating pain.  It is a deep aching pain.  When it occurs she cannot get comfortable.  The pain is perhaps not so constant as it was in the past.  It is intermittent.  It tends to be the worse after her long surgical day doing Mohs surgery with her dermatology practitioner.  There she is standing for most of the day.  When she goes home she is in severe pain which last through the next day.  She also gets pain in the evening time and night which makes it impossible for her to sleep.  When severe the pain is definitely 10/10.  The pain at present is in the right paracervical region radiating to the right suprascapular region and down into the right upper arm.  It does not go below the upper arm at this time.  However there is definite paresthesia that extend down into the hand.  The patient does not notice that the pain is worse with neck movement.  The patient tried taking oxycodone which caused intolerable nausea.  Valium helps a bit.  Robaxin caused rash.  Flexeril was of no benefit.    The patient does have some weakness in her right arm.  She has difficulty holding the right arm up over her head when she blow dries her hair.  The pain is now becoming more diffuse and extending down into the torso region.  She denies Lhermitte's phenomena.  She denies pain in the left arm.  There is no gait or bowel or bladder difficulties.    She states that others have begun to notice that she has asymmetry of her scapula.  The right scapula seems to lie flat against her torso.  The left scapula seems to protrude a bit which she regards is normal position.    Neurological exam is recorded below but in summary these are the findings:    There  is weakness 4 out of 5 of the right deltoid without atrophy.  There is trace weakness 4+ out of 5 of the right infraspinatus, right biceps and right brachial radialis.  There is no other weakness of the arm.  The right supinator is strong.  All other muscles are strong including the flexor pollicis longus.  There is trace reduction of reflex in the right brachioradialis.  There is slight loss of pinprick sensation over the second digit.  The right scapula does lie flatter against the torso than the left.  There is no winging of the scapula.  I think there is may be some slight atrophy of the right supraspinatus muscle.    Past Medical History:   Past Medical History:   Diagnosis Date     Anxiety      ASCUS of cervix with negative high risk HPV 10/02/2017    10/2/17 ASCUS, Neg HPV     Constipation      Ectopic pregnancy 9/26/11     Thyroid disease     hypothyroidism       Past Surgical History:   Past Surgical History:   Procedure Laterality Date     BREAST SURGERY      breast reduction surgery     ECTOPIC PREGNANCY SURGERY       LAPAROSCOPIC SALPINGOTOMY  9/26/11    right for ectopic       Medications:  Current Outpatient Medications   Medication Sig Dispense Refill     amphetamine-dextroamphetamine (ADDERALL) 15 MG tablet Take 1 tablet (15 mg) by mouth 2 times daily 60 tablet 0     levothyroxine (SYNTHROID/LEVOTHROID) 125 MCG tablet Take 1 tablet (125 mcg) by mouth daily 90 tablet 1     spironolactone (ALDACTONE) 100 MG tablet Take 1 tablet (100 mg) by mouth daily 90 tablet 4     methocarbamol (ROBAXIN) 750 MG tablet Take 1 tablet (750 mg) by mouth 4 times daily as needed for muscle spasms (Patient not taking: Reported on 2/10/2020) 30 tablet 1     methylPREDNISolone (MEDROL DOSEPAK) 4 MG tablet therapy pack Follow Package Directions (Patient not taking: Reported on 2/10/2020) 21 tablet 0     oxyCODONE (ROXICODONE) 5 MG tablet Take 1-2 tablets (5-10 mg) by mouth every 8 hours as needed for severe pain (Patient not  taking: Reported on 2/10/2020) 15 tablet 0     traZODone (DESYREL) 50 MG tablet Take 1.5 tablets (75 mg) by mouth At Bedtime (Patient not taking: Reported on 2/10/2020) 45 tablet 3     zolpidem (AMBIEN) 10 MG tablet Take 1 tablet (10 mg) by mouth nightly as needed for sleep (Patient not taking: Reported on 2/10/2020) 15 tablet 1          Movement Disorder-related Medications                                                                                                                                                             Allergies: is allergic to weed [grass].    Social History:   Social History     Socioeconomic History     Marital status: Single     Spouse name: None     Number of children: None     Years of education: None     Highest education level: None   Occupational History     None   Social Needs     Financial resource strain: None     Food insecurity:     Worry: None     Inability: None     Transportation needs:     Medical: None     Non-medical: None   Tobacco Use     Smoking status: Never Smoker     Smokeless tobacco: Never Used   Substance and Sexual Activity     Alcohol use: Yes     Frequency: 2-4 times a month     Drinks per session: 1 or 2     Comment: Once a week     Drug use: No     Sexual activity: Yes     Partners: Male   Lifestyle     Physical activity:     Days per week: None     Minutes per session: None     Stress: None   Relationships     Social connections:     Talks on phone: None     Gets together: None     Attends [employer] service: None     Active member of club or organization: None     Attends meetings of clubs or organizations: None     Relationship status: None     Intimate partner violence:     Fear of current or ex partner: None     Emotionally abused: None     Physically abused: None     Forced sexual activity: None   Other Topics Concern     Parent/sibling w/ CABG, MI or angioplasty before 65F 55M? Not Asked   Social History Narrative     None       Family  History:  Family History   Problem Relation Age of Onset     Hypertension Father      Genitourinary Problems Father         Kidney stones     Lipids Father      Thyroid Disease Father      Cerebrovascular Disease Paternal Grandfather      Cancer Paternal Grandmother         Pancreatic cancer     Cardiovascular Maternal Grandfather         Heart attack     Thyroid Disease Mother      Breast Cancer No family hx of      Colon Cancer No family hx of        ROS:    Neurologic  Visual loss: no, Double vision: no, Headache: no, Loss of consciousness:  no, Seizure: no, Fainting (syncope): no, Dysarthria: no, Dysphagia:  no, Vertigo:  no, Weakness: YES, Atrophy: no, Twitches: no, Lhermitte's: no, Numbness or tingling: YES, Handwriting change: no, Tremors: no, Involuntary movements: no, Imbalance: no, Abnormal gait:  no, Falls :no, Memory loss: no, RBD: no, Sleep disorder: no, Hallucination: no, Loss of concentration: no,  Behavioral change: no,  Loss of motivation: no      Comprehensive Neurologic Exam    Mental Status Exam   The patient is alert, oriented and exhibits no difficulty with cognition or memory.  A formal short test of mental status was performed.     Neurovascular         Speech and Language   Right Left     Carotid Bruit Absent Absent  Speech is normal.   Dorsalis Pedis Pulse Normal Normal  Description   Posterior Tibial Pulse Normal Normal  Language is normal.     Cranial Nerve Exam                 Right Left   Right Left   II                                        Normal Normal  Hearing (VIII) Normal Normal      III, IV, V!                   Normal Normal  Nystagmus (VIII) Normal Normal   EOM description                              Gag (IX, X) Normal Normal   Facial Sensation (V) Normal Normal  SCM (XI) Normal Normal   Muscles of Mastication (V) Normal Normal  Trapezius (XI) Normal Normal   Facial Strength (VII) Normal Normal  Tongue (XII) Normal Normal     Motor Exam  Strength (*/5 grading)  Muscle                   Right Left  Muscle Right Left   Frontalis                                           Normal Normal  Iliopsoas Normal    Normal          Orbicularis Oculi                     Normal Normal  Quadrideps Normal    Normal        Orbicularis Oris                         Normal Normal  Anterior Tibial Normal Normal      Deltoid 4 Normal  Gastrocnemius Normal    Normal   Biceps 4+ Normal  Extensor Hallucis Longus Normal    Normal   Triceps Normal Normal  Toe Extensors Normal    Normal   EDC Normal Normal  Toe Flexor Normal    Normal   Finger Flexors Normal Normal  Brachioradialis 4+ Normal   First Dorsal Interosseous Normal Normal  Supinator Normal Normal   Hypothenar Normal Normal  Flexor Pollicis Longus Normal Normal   Thenar Normal Normal  Other Normal Normal     Reflexes      Tone   Right Left   Right Left   Biceps Normal Normal  Spasticity (S)  Rigidity (R)     Triceps -1 Normal  Neck     Brachioradialis -1 Normal  Arm     Quadriceps Normal Normal  Leg     Ankle Normal Normal       Babkinski Flexor Flexor         AMR       Coordination   Right Left   Right Left   Fingers Normal Normal  Finger nose finger Normal Normal   Hand Normal Normal  Drift Normal Normal   Leg Normal Normal  Heel Pena Normal Normal   Foot Normal Normal  Other     Other               Involuntary Movements    Gait and Station  None            Right Left  Stand & Sit Normal   (Movement type) Normal Normal  Gait Normal   (Movement type) Normal Normal  Tandem Normal   (Movement type) Normal Normal  Romberg Absent       Sensory Exam          Right Left    Pin Normal Normal    Vibration Normal Normal    Joint position Normal Normal    Other             Coding statement:     Duration of  Services: face-to-face 45 min.   Greater than 50% of this visit was spent in counseling and coordination of care.

## 2020-02-10 NOTE — TELEPHONE ENCOUNTER
RECORDS RECEIVED FROM: Internal   Date of Appt: 2/10/20   NOTES (FOR ALL VISITS) STATUS DETAILS   OFFICE NOTE from referring provider Internal Dr Resendiz @  Neurosurgery:  1/10/20   OFFICE NOTE from other specialist N/A    DISCHARGE SUMMARY from hospital N/A    DISCHARGE REPORT from the ER Internal [facility]:  12/4/19   OPERATIVE REPORT N/A    MEDICATION LIST Internal    IMAGING  (FOR ALL VISITS)     EMG N/A    EEG N/A    MRI (HEAD, NECK, SPINE) Internal [facility]:  MRI Cervical Spine 12/22/19   LUMBAR PUNCTURE N/A    [facility] Scan N/A    CT (HEAD, NECK, SPINE) N/A

## 2020-02-12 ENCOUNTER — OFFICE VISIT (OUTPATIENT)
Dept: NEUROLOGY | Facility: CLINIC | Age: 35
End: 2020-02-12
Payer: COMMERCIAL

## 2020-02-12 DIAGNOSIS — M54.12 CERVICAL RADICULOPATHY: Primary | ICD-10-CM

## 2020-02-12 NOTE — PROGRESS NOTES
[facility]  Electrodiagnostic Laboratory    Nerve Conduction & EMG Report          Patient:       Babita Hartley  Patient ID:    [id number]  Gender:        Female  YOB: 1985  Age:           34 Years 6 Months        History & Examination: This is a 34-year-old female with a two-month history of severe right neck shoulder and upper arm pain.  She is also had right hand paresthesia.  Her MRI of the cervical spine shows significant C5-6 and C6-7 central disc herniation.      Techniques: Motor conduction studies were done with surface recording electrodes. Sensory conduction studies were performed with surface electrodes, unless indicated otherwise by (n), designating the use of subdermal recording electrodes. Temperature was monitored and recorded throughout the study. Upper extremities were maintained at a temperature of 32 degrees Centigrade or higher.  Lower extremities were maintained at a temperature of 31degrees Centigrade or higher. EMG was done with a concentric needle electrode.       Results: The median antidromic sensory nerve action potentials are normal amplitude and robust bilaterally.  There are distal conduction velocities are normal.  The right ulnar antidromic sensory nerve action potential is normal.  Bilateral lateral cutaneous nerve of the forearm sensory nerve action potentials are normal.  This potential is  higher amplitude on the right.  Right median and ulnar motor conduction studies are normal.  Needle exam reveals mild fibrillation in the right supraspinatus and right C5 paraspinal region.  Long-duration often polyphasic units are seen in C5-6 muscles throughout the right arm including the rhomboid, but sparing the brachioradialis and pronator teres.    Interpretation: The EMG is abnormal.  The EMG findings are consistent with a subacute to chronic right C5-6 radiculopathy.  The findings suggest that it is more likely the right C5 nerve root which is  involved.  There is no EMG evidence for a right brachial plexopathy.    EMG Physician: Camden Kuhn MD         Sensory NCS      Nerve / Sites Rec. Site Onset Peak Ref. NP Amp Ref. PP Amp Dist Nura Ref. Temp     ms ms ms  V  V  V cm m/s m/s  C   R MEDIAN - Dig II Anti      Wrist Dig II 2.40 3.28  75.8 10.0 140.3 14 58.4 48.0 31.9   L MEDIAN - Dig II Anti      Wrist Dig II 2.60 3.49  56.2 10.0 117.3 14 53.8 48.0 32.5   R ULNAR - Dig V Anti      Wrist Dig V 2.19 3.02  38.4 8.0 59.0 12.5 57.1 48.0 32.7   R MUSCULOCUTANEOUS      Elbow Forearm 1.51 1.98 3.30 30.0  104.6 10.5 69.5  33.6   L MUSCULOCUTANEOUS      Elbow Forearm 1.51 1.93 3.30 13.8  13.7 10.5 69.5  32.4      Elbow Forearm 1.51 2.03 3.30 12.7  12.6 10.5 69.5  32.4       Motor NCS      Nerve / Sites Rec. Site Lat Ref. Amp Ref. Rel Amp Dist Nura Ref. Dur. Area Temp.     ms ms mV mV % cm m/s m/s ms %  C   R MEDIAN - APB      Wrist APB 3.44 4.40 13.4 5.0 100 8   6.61 100 33      Elbow APB 7.29  12.8  95.6 20 51.9 48.0 6.93 92.5 33   R ULNAR - ADM      Wrist ADM 3.02 3.50 16.1 5.0 100 8   6.56 100 33.3      B.Elbow ADM 5.99  16.2  101 18 60.6 48.0 6.88 95.5 33.3      A.Elbow ADM 7.29  16.0  99.7 8 61.4 48.0 7.03 101 33.3       Needle EMG (W)      EMG Summary Table     Spontaneous MUAP Recruitment    IA Fib/PSW Fasc H.F. Amp Dur. PPP Pattern   R. DELTOID N None None None 1+ 1+ 1+ Mildly Reduced   R. BICEPS N None None None 1+ 1+ N N   R. TRICEPS N None None None N N N N   R. BRACHIORADIALIS N None None None N N N N   R. PRON TERES N None None None N N N N   R. FIRST D INTEROSS N None None None N N N N   R. SUPRASPINATUS Increased 1+ None None 1+ 1+ N N   R. RHOMB MAJOR N None None None 1+ 1+ N N   R. INFRASPINATUS N None None None N N N N   R. C5 PSP Increased 1+ None None 1+ 1+ N N

## 2020-02-12 NOTE — LETTER
2/12/2020       RE: Babita Hartley  [address]  [place] [address]     Dear Colleague,    Thank you for referring your patient, Babita Hartley, to the [facility] EMG at [facility]. Please see a copy of my visit note below.        [facility]  Electrodiagnostic Laboratory    Nerve Conduction & EMG Report          Patient:       Babita Hartley  Patient ID:    [id number]  Gender:        Female  YOB: 1985  Age:           34 Years 6 Months        History & Examination: This is a 34-year-old female with a two-month history of severe right neck shoulder and upper arm pain.  She is also had right hand paresthesia.  Her MRI of the cervical spine shows significant C5-6 and C6-7 central disc herniation.      Techniques: Motor conduction studies were done with surface recording electrodes. Sensory conduction studies were performed with surface electrodes, unless indicated otherwise by (n), designating the use of subdermal recording electrodes. Temperature was monitored and recorded throughout the study. Upper extremities were maintained at a temperature of 32 degrees Centigrade or higher.  Lower extremities were maintained at a temperature of 31degrees Centigrade or higher. EMG was done with a concentric needle electrode.       Results: The median antidromic sensory nerve action potentials are normal amplitude and robust bilaterally.  There are distal conduction velocities are normal.  The right ulnar antidromic sensory nerve action potential is normal.  Bilateral lateral cutaneous nerve of the forearm sensory nerve action potentials are normal.  This potential is  higher amplitude on the right.  Right median and ulnar motor conduction studies are normal.  Needle exam reveals mild fibrillation in the right supraspinatus and right C5 paraspinal region.  Long-duration often polyphasic units are seen in C5-6 muscles throughout the right arm  including the rhomboid, but sparing the brachioradialis and pronator teres.    Interpretation: The EMG is abnormal.  The EMG findings are consistent with a subacute to chronic right C5-6 radiculopathy.  The findings suggest that it is more likely the right C5 nerve root which is involved.  There is no EMG evidence for a right brachial plexopathy.    EMG Physician: Camden Kuhn MD         Sensory NCS      Nerve / Sites Rec. Site Onset Peak Ref. NP Amp Ref. PP Amp Dist Nura Ref. Temp     ms ms ms  V  V  V cm m/s m/s  C   R MEDIAN - Dig II Anti      Wrist Dig II 2.40 3.28  75.8 10.0 140.3 14 58.4 48.0 31.9   L MEDIAN - Dig II Anti      Wrist Dig II 2.60 3.49  56.2 10.0 117.3 14 53.8 48.0 32.5   R ULNAR - Dig V Anti      Wrist Dig V 2.19 3.02  38.4 8.0 59.0 12.5 57.1 48.0 32.7   R MUSCULOCUTANEOUS      Elbow Forearm 1.51 1.98 3.30 30.0  104.6 10.5 69.5  33.6   L MUSCULOCUTANEOUS      Elbow Forearm 1.51 1.93 3.30 13.8  13.7 10.5 69.5  32.4      Elbow Forearm 1.51 2.03 3.30 12.7  12.6 10.5 69.5  32.4       Motor NCS      Nerve / Sites Rec. Site Lat Ref. Amp Ref. Rel Amp Dist Nura Ref. Dur. Area Temp.     ms ms mV mV % cm m/s m/s ms %  C   R MEDIAN - APB      Wrist APB 3.44 4.40 13.4 5.0 100 8   6.61 100 33      Elbow APB 7.29  12.8  95.6 20 51.9 48.0 6.93 92.5 33   R ULNAR - ADM      Wrist ADM 3.02 3.50 16.1 5.0 100 8   6.56 100 33.3      B.Elbow ADM 5.99  16.2  101 18 60.6 48.0 6.88 95.5 33.3      A.Elbow ADM 7.29  16.0  99.7 8 61.4 48.0 7.03 101 33.3       Needle EMG (W)      EMG Summary Table     Spontaneous MUAP Recruitment    IA Fib/PSW Fasc H.F. Amp Dur. PPP Pattern   R. DELTOID N None None None 1+ 1+ 1+ Mildly Reduced   R. BICEPS N None None None 1+ 1+ N N   R. TRICEPS N None None None N N N N   R. BRACHIORADIALIS N None None None N N N N   R. PRON TERES N None None None N N N N   R. FIRST D INTEROSS N None None None N N N N   R. SUPRASPINATUS Increased 1+ None None 1+ 1+ N N   R. RHOMB MAJOR N None None None 1+  1+ N N   R. INFRASPINATUS N None None None N N N N   R. C5 PSP Increased 1+ None None 1+ 1+ N N                          Again, thank you for allowing me to participate in the care of your patient.      Sincerely,    Camden Kuhn MD

## 2020-02-21 ENCOUNTER — TELEPHONE (OUTPATIENT)
Dept: MEDSURG UNIT | Facility: CLINIC | Age: 35
End: 2020-02-21

## 2020-02-21 RX ORDER — DEXTROSE MONOHYDRATE 25 G/50ML
25-50 INJECTION, SOLUTION INTRAVENOUS
Status: CANCELLED | OUTPATIENT
Start: 2020-02-21

## 2020-02-21 RX ORDER — NICOTINE POLACRILEX 4 MG
15-30 LOZENGE BUCCAL
Status: CANCELLED | OUTPATIENT
Start: 2020-02-21

## 2020-02-24 ENCOUNTER — NURSE TRIAGE (OUTPATIENT)
Dept: NURSING | Facility: CLINIC | Age: 35
End: 2020-02-24

## 2020-02-24 ENCOUNTER — HOSPITAL ENCOUNTER (OUTPATIENT)
Facility: CLINIC | Age: 35
Discharge: HOME | End: 2020-02-24
Admitting: RADIOLOGY
Payer: COMMERCIAL

## 2020-02-24 ENCOUNTER — HOSPITAL ENCOUNTER (OUTPATIENT)
Dept: GENERAL RADIOLOGY | Facility: CLINIC | Age: 35
End: 2020-02-24
Attending: PSYCHIATRY & NEUROLOGY
Payer: COMMERCIAL

## 2020-02-24 VITALS
RESPIRATION RATE: 18 BRPM | HEART RATE: 88 BPM | SYSTOLIC BLOOD PRESSURE: 130 MMHG | TEMPERATURE: 98.3 F | DIASTOLIC BLOOD PRESSURE: 78 MMHG

## 2020-02-24 DIAGNOSIS — M54.12 CERVICAL RADICULOPATHY: ICD-10-CM

## 2020-02-24 PROCEDURE — 64479 NJX AA&/STRD TFRM EPI C/T 1: CPT | Mod: 50

## 2020-02-24 PROCEDURE — 40000863 ZZH STATISTIC RADIOLOGY XRAY, US, CT, MAR, NM

## 2020-02-24 PROCEDURE — 25000128 H RX IP 250 OP 636: Performed by: RADIOLOGY

## 2020-02-24 PROCEDURE — 25000125 ZZHC RX 250: Performed by: RADIOLOGY

## 2020-02-24 PROCEDURE — 25500064 ZZH RX 255 OP 636: Performed by: RADIOLOGY

## 2020-02-24 RX ORDER — IOPAMIDOL 408 MG/ML
20 INJECTION, SOLUTION INTRATHECAL ONCE
Status: COMPLETED | OUTPATIENT
Start: 2020-02-24 | End: 2020-02-24

## 2020-02-24 RX ORDER — DEXAMETHASONE SODIUM PHOSPHATE 10 MG/ML
20 INJECTION, SOLUTION INTRAMUSCULAR; INTRAVENOUS ONCE
Status: COMPLETED | OUTPATIENT
Start: 2020-02-24 | End: 2020-02-24

## 2020-02-24 RX ORDER — LIDOCAINE HYDROCHLORIDE 10 MG/ML
5 INJECTION, SOLUTION EPIDURAL; INFILTRATION; INTRACAUDAL; PERINEURAL ONCE
Status: COMPLETED | OUTPATIENT
Start: 2020-02-24 | End: 2020-02-24

## 2020-02-24 RX ORDER — LIDOCAINE HYDROCHLORIDE 10 MG/ML
30 INJECTION, SOLUTION EPIDURAL; INFILTRATION; INTRACAUDAL; PERINEURAL ONCE
Status: COMPLETED | OUTPATIENT
Start: 2020-02-24 | End: 2020-02-24

## 2020-02-24 RX ORDER — NICOTINE POLACRILEX 4 MG
15-30 LOZENGE BUCCAL
Status: DISCONTINUED | OUTPATIENT
Start: 2020-02-24 | End: 2020-02-24 | Stop reason: HOSPADM

## 2020-02-24 RX ORDER — DEXTROSE MONOHYDRATE 25 G/50ML
25-50 INJECTION, SOLUTION INTRAVENOUS
Status: DISCONTINUED | OUTPATIENT
Start: 2020-02-24 | End: 2020-02-24 | Stop reason: HOSPADM

## 2020-02-24 RX ADMIN — IOPAMIDOL 1 ML: 408 INJECTION, SOLUTION INTRATHECAL at 11:26

## 2020-02-24 RX ADMIN — LIDOCAINE HYDROCHLORIDE 4 ML: 10 INJECTION, SOLUTION EPIDURAL; INFILTRATION; INTRACAUDAL; PERINEURAL at 11:58

## 2020-02-24 RX ADMIN — DEXAMETHASONE SODIUM PHOSPHATE 20 MG: 10 INJECTION, SOLUTION INTRAMUSCULAR; INTRAVENOUS at 11:26

## 2020-02-24 RX ADMIN — LIDOCAINE HYDROCHLORIDE 18 ML: 10 INJECTION, SOLUTION EPIDURAL; INFILTRATION; INTRACAUDAL; PERINEURAL at 11:25

## 2020-02-24 NOTE — DISCHARGE INSTRUCTIONS
Nerve root Injection Discharge Instructions     After you go home:      You may resume your normal diet.    Care of Puncture Site:      If you have a bandaid on your puncture site, you may remove it the next morning    You may shower tomorrow    No bath tubs, whirlpools or swimming for at least 3 days     Activity:      You may go back to normal activity in 24 hours    You should let pain be your guide as to the extent of your activities    Maintain any activity limitations as ordered by your provider    Do NOT drive a vehicle if you develop numbness in your arm or leg    Medicines:      You may resume all medications    Resume your Warfarin/Coumadin at your regular dose today. Follow up with your provider to have your INR rechecked    Resume your Platelet Inhibitors and Aspirin tomorrow at your regular dose    For minor pain, you may take Acetaminophen (Tylenol) or Ibuprofen (Advil)    Pain:       You may experience increased or different pain over the next 24-48 hours    For the next 48 hrs - you may use ice packs for discomfort     Call your primary care doctor if:      You have severe pain that does not improve with pain medication    You have chills or a fever greater than 101 F (38 C)    The site is red, swollen, hot or tender    New problems with your bowel or bladder    Any questions or concerns    Other Instructions:      New numbness down your leg post injection is temporary and may last for up to 6 hours. You may need assistance with activity until your leg has normal sensation.    If you are diabetic, monitor your blood sugar closely. Contact the provider who manages your diabetes to help you control your blood sugar if needed.    For Your Information:      A steroid was injected to help decrease swelling and may help to reduce pain. It may take up to 7-10 days to obtain full results.    Some patients will get lasting relief from a single injection. Others may require up to 3 injections to get results.  If you have more than one steroid injection, they should be given 2 weeks apart.    Side effects of your steroid injection are mild and will go away in 2-3 days  - Insomnia  - Heartburn  - Flushed face  - Water retention  - Increased appetite  - Increased blood sugar      If you have questions call:        [facility] @ [phone]      The provider who performed your procedure was _________________.

## 2020-02-24 NOTE — PROCEDURES
[facility]    Procedure: IR Procedure Note  Date/Time: 2/24/2020 11:57 AM  Performed by: Jesus Vanegas MD  Authorized by: Jesus Vanegas MD     UNIVERSAL PROTOCOL   Site Marked: NA  Prior Images Obtained and Reviewed:  Yes  Required items: Required blood products, implants, devices and special equipment available    Patient identity confirmed:  Verbally with patient, arm band, provided demographic data and hospital-assigned identification number  NA - No sedation, light sedation, or local anesthesia  Confirmation Checklist:  Patient's identity using two indicators, relevant allergies, procedure was appropriate and matched the consent or emergent situation and correct equipment/implants were available  Time out: Immediately prior to the procedure a time out was called    Universal Protocol: the Joint Commission Universal Protocol was followed    Preparation: Patient was prepped and draped in usual sterile fashion    ESBL (mL):  0         ANESTHESIA    Anesthesia: Local infiltration  Local Anesthetic:  Lidocaine 1% without epinephrine  Anesthetic Total (mL):  18      SEDATION    Patient Sedated: No    See dictated procedure note for full details.  Findings: Bilateral C5-6 TFESI performed. Left side done initially inadvertently, then right side completed after discussing with patient.    Specimens: none    Complications: None    Condition: Stable    Plan: Bedrest x 30 min, then discharge if no issues    PROCEDURE   Patient Tolerance:  Patient tolerated the procedure well with no immediate complications  Describe Procedure: Bilateral C5-6 TFESI  Length of time physician/provider present for 1:1 monitoring during sedation: 0

## 2020-02-24 NOTE — PROGRESS NOTES
Care Suites Admission Nursing Note    Patient Information  Name: Babita Hartley  Age: 34 year old  Reason for admission: Nerve Root injection  Care Suites arrival time: 1015    Patient Admission/Assessment   Pre-procedure assessment complete: YES  If abnormal assessment/labs, provider notified: N/A, denies pregnancy  NPO: YES  Medications held per instructions/orders: YES  Consent: obtained  Patient oriented to room: YES  Education/questions answered: NO  Plan/other: Proceed    Discharge Planning  Accompanied by: Samantha- friend  Discharge name/phone number: pt does not have # presently  Overnight post sedation caregiver: n/a  Discharge location: home    Aidan Macdonald RN

## 2020-02-25 NOTE — TELEPHONE ENCOUNTER
"\"I had a cervical thoracic injection earlier today. First of all I had 2 because they gave it to me on the wrong side. So then he gave it in the correct area. That was hours ago. I am really hot and sweaty. I have terrible leg cramps in both legs(rates pain an 8/10). I had called Dr. Kuhn(Neurology) earlier and he said to drink lots of water, which I did but it's not helping at all. \" Patient denies other sx at this time. Triaged and advised ER. Call back if needed.  Treasure Colindres RN [facility]        Reason for Disposition    [1] SEVERE pain (e.g., excruciating, unable to do any normal activities) AND [2] not improved 2 hours after pain medicine    Additional Information    Negative: [1] Drinking very little AND [2] dehydration suspected (e.g., no urine > 12 hours, very dry mouth, very lightheaded)    Negative: Patient sounds very sick or weak to the triager    Protocols used: MUSCLE ACHES AND BODY PAIN-A-AH      "

## 2020-03-02 ENCOUNTER — MYC REFILL (OUTPATIENT)
Dept: FAMILY MEDICINE | Facility: CLINIC | Age: 35
End: 2020-03-02

## 2020-03-02 DIAGNOSIS — F90.0 ADHD (ATTENTION DEFICIT HYPERACTIVITY DISORDER), INATTENTIVE TYPE: ICD-10-CM

## 2020-03-04 PROBLEM — F90.0 ADHD (ATTENTION DEFICIT HYPERACTIVITY DISORDER), INATTENTIVE TYPE: Status: ACTIVE | Noted: 2019-09-26

## 2020-03-04 RX ORDER — DEXTROAMPHETAMINE SACCHARATE, AMPHETAMINE ASPARTATE, DEXTROAMPHETAMINE SULFATE AND AMPHETAMINE SULFATE 3.75; 3.75; 3.75; 3.75 MG/1; MG/1; MG/1; MG/1
15 TABLET ORAL 2 TIMES DAILY
Qty: 60 TABLET | Refills: 0 | Status: CANCELLED | OUTPATIENT
Start: 2020-03-04

## 2020-03-09 ENCOUNTER — TELEPHONE (OUTPATIENT)
Dept: NEUROLOGY | Facility: CLINIC | Age: 35
End: 2020-03-09

## 2020-03-09 NOTE — TELEPHONE ENCOUNTER
Babita had her selective cervical nerve root block on February 24 of 2020.  Unfortunately the left side was injected first which was not her symptomatic side.  The right side at C5-C6 was injected.    Afterwards Babita had a acute response with skin outbreak and aching in her legs.  She then developed orthostatic headaches consistent with a spinal leak.  She spent several days at rest taking fluids and thankfully the symptoms resolved.  The good news is that the injection completely relieved her right neck and arm pain.    Babita called today and left a message indicating that she was having neck pain.  I attempted to call her back but have not been able to reach her.  I will attempt to reach her sometime later today.    Camden Kuhn MD

## 2020-03-13 ENCOUNTER — VIRTUAL VISIT (OUTPATIENT)
Dept: FAMILY MEDICINE | Facility: OTHER | Age: 35
End: 2020-03-13

## 2020-03-13 NOTE — PROGRESS NOTES
"Date: 2020 09:42:07  Clinician: Maricarmen Loving  Clinician NPI: [id number]  Patient: Babita Hartley  Patient : 1985  Patient Address: [address] [place] [address]  Patient Phone: [phone]  Visit Protocol: URI  Patient Summary:  Babita is a 34 year old ( : 1985 ) female who initiated a Visit for COVID-19 (Coronavirus) evaluation and screening. When asked the question \"Please sign me up to receive news, health information and promotions from "[employer]\", Babita responded \"Yes\".    Babita states her symptoms started today.   Her symptoms consist of malaise, a sore throat, a cough, a headache, chills, and myalgia. She is experiencing mild difficulty breathing with activities but can speak normally in full sentences. Babita also feels feverish.   Symptom details     Cough: Babita coughs every 5-10 minutes and her cough is not more bothersome at night. Phlegm comes into her throat when she coughs. She does not believe her cough is caused by post-nasal drip. The color of the phlegm is yellow.     Sore throat: Babita reports having moderate throat pain (4-6 on a 10 point pain scale), does not have exudate on her tonsils, and can swallow liquids. The lymph nodes in her neck are not enlarged. A rash has not appeared on the skin since the sore throat started.     Temperature: Her current temperature is 101 degrees Fahrenheit. Babita has had a temperature over 100 degrees Fahrenheit for 1-2 days.     Headache: She states the headache is moderate (4-6 on a 10 point pain scale).      Babita denies having rhinitis, wheezing, ear pain, nasal congestion, teeth pain, enlarged lymph nodes, and facial pain or pressure. She also denies taking antibiotic medication for the symptoms, having recent facial or sinus surgery in the past 60 days, and having a sinus infection within the past year.   Precipitating events  Within the past week, Babita has not been exposed to someone with strep throat. She " has not recently been exposed to someone with influenza. Babita has been in close contact with the following high risk individuals: people with asthma, heart disease or diabetes, adults 65 or older, pregnant women, and children under the age of 5.   Pertinent COVID-19 (Coronavirus) information  Babita has not traveled internationally in the last 14 days before the start of her symptoms.   Babita has not had close contact with a laboratory confirmed positive COVID-19 patient within 14 days of symptom onset. 101   Pertinent medical history  Babita does not get yeast infections when she takes antibiotics.   Babita does not need a return to work/school note.   Weight: 120 lbs   Babita does not smoke or use smokeless tobacco.   She denies pregnancy and denies breastfeeding. She has menstruated in the past month.   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 To do this follow these instructions:  You should go to one of our dedicated testing centers as soon as possible during the hours below at one of these locations:   Walk-in Care: [facility] at [address], [place] [address]. Hours: M-F 7am - 6pm, Sat-Sun 8am -- 3pm   [facility] at [address] [address]. Hours: Every Day 9am -- 7pm  Walk-in Care: [facility] at [address] [place] [address]. Hours: M-F 7am - 6pm, Sat-Sun 8am -- 3pm  [place] [address] [place] [address]. Hours: M-F 11am -- 7pm, Sat-Sun 9am-4pm    What to expect:   When you arrive please come park in the parking lot.  Call [phone] and let them know which of the four clinics you are at, description of your car and where you are parked. Mention you did an [facility] visit and were sent for testing.  They will add you to the queue to get your test (you will stay in your car the entire time).  On that phone call you will give them the information to register your for the visit.  You will then be met by a provider who will perform a brief assessment in your car and collect samples to send for Corona Virus 19, influenza and possibly RSV.  You will be given patient information about respiratory illnesses and instructions. You with the results if you are not on mychart.   Isolate Yourself:   Isolate yourself while traveling.  Do Not allow any visitors within 6 feet.  Do Not go to work or school.  Do Not go to Confucianist,  centers, shopping, or other public places.  Do Not shake hands.  Avoid close contact with others (hugging, kissing).  Protect Others:  Cover Your Mouth and Nose with a mask, disposable tissue or wash cloth to avoid spreading germs to others.  Wash your hands and face frequently with soap and water   Fever Medicines:   For fever relief, take acetaminophen or ibuprofen.  Treat fevers above 101deg F (38.3deg C) to lower fevers and make you more comfortable.   Acetaminophen (e.g., Tylenol): Take 650 mg (two 325 mg pills) by mouth every 4-6 hours as needed of regular strength Tylenol or 1,000 mg (two 500 mg pills) every 8 hours as needed of Extra Strength Tylenol.   Ibuprofen (e.g., Motrin, Advil): Take 400 mg (two 200 mg pills) by mouth every 6 hours as needed.   Acetaminophen is thought to be safer than ibuprofen or naproxen for people over 65 years old. Acetaminophen is in many OTC and prescription medicines. It might be in more than one medicine that you are taking. You need to be careful and not take an overdose. Before taking any medicine,  read all the instructions on the package.  Caution -NSAIDs (e.g., ibuprofen, naproxen): Do not take nonsteroidal anti-inflammatory drugs (NSAIDs) if you have stomach problems, kidney disease, heart failure, or other contraindications to using this type of medicine. Do not take NSAID medicines for over 7 days without consulting your PCP. Do not take NSAID medicines if you are pregnant. Do not take NSAID medicines if you are also taking blood thinners.   Call Back If: Breathing difficulty develops or you become worse.  Thank you for limiting contact with others, wearing a simple mask to cover your cough, practice good hand hygiene habits and accessing our virtual services where possible to limit the spread of this virus.  For more information about COVID19 and options for caring for yourself at home, please visit the CDC website at https://www.cdc.gov/coronavirus/2019-ncov/about/steps-when-sick.html  For more options for care at [facility], please visit our website at [email or web address]    COVID-19 (Coronavirus) General Information  We understand it may be concerning to be ill with symptoms that overlap with COVID-19 (Coronavirus) symptoms. Below are some helpful information on COVID-19 (Coronavirus).  How can I protect myself and others from the COVID-19 (Coronavirus)?  Because there is currently no vaccine to prevent infection, the best way to protect yourself is to avoid being exposed to this virus. The CDC recommends the following additional steps:     Wash your hands often with soap and water for at least 20 seconds, especially after blowing your nose, coughing, or sneezing; going to the bathroom; and before eating or preparing food.  Use an alcohol-based hand  that contains at least 60 percent alcohol if soap and water are not available.        Avoid touching your eyes, nose and mouth with unwashed hands.    Avoid close contact with people who are sick.    Stay home  when you are sick.    Cover your cough or sneeze with a tissue, then throw the tissue in the trash.    Clean and disinfect frequently touched objects and surfaces.     You can help stop COVID-19 (Coronavirus) by knowing the signs and symptoms:     Fever    Cough    Shortness of breath     Contact your healthcare provider if   Develop symptoms   AND   Have been in close contact with a person known to have COVID-19 (Coronavirus) or live in or have recently traveled from an area with ongoing spread of COVID-19 (Coronavirus). Call ahead before you go to a doctor's office or emergency room. Tell them about your recent travel and your symptoms.   For the most up to date information, visit the CDC's website.  Steps to help prevent the spread of COVID-19 (Coronavirus) if you are sick  If you are sick with COVID-19 (Coronavirus) or suspect you are infected with the virus that causes COVID-19 (Coronavirus), follow the steps below to help prevent the disease from spreading&nbsp;to people in your home and community.     Stay home except to get medical care. Home isolation may be started in consultation with your healthcare clinician.    Separate yourself from other people and animals in your home.    Call ahead before visiting your doctor if you have a medical appointment.    Wear a facemask when you are around other people.    Cover your cough and sneezes.    Clean your hands often.    Avoid sharing personal household items.    Clean and disinfect frequently touched objects and surfaces everyday.    You will need to have someone drop off medications or household supplies (if needed) at your house without coming inside or in contact with you or others living in your house.    Monitor your symptoms and seek prompt medical care if your illness is worsening (e.g. Difficulty breathing).    Discontinue home isolation only in consultation with your healthcare provider.     For more detailed and up to date information on what to do if  "you are sick, visit this link: What to Do If You Are Sick With Coronavirus Disease 2019 (COVID-19).  Do I need to be tested for COVID-19 (Coronavirus)?     At this time, the limited number of tests available are controlled by the state and local health departments and are being reserved for more seriously ill patients, those with known exposure to confirmed patients, and those with recent travel (within 14 days) to countries with high rates of COVID-19 (Coronavirus).    Decisions on which patients receive testing will be based on the local spread of COVID-19 (Coronavirus) as well as the symptoms. Your healthcare provider will make the final decision on whether you should be tested.    In the meantime, if you have concerns that you may have been exposed, it is reasonable to practice \"social distancing.\"&nbsp; If you are ill with a cold or flu like illness, please monitor your symptoms and reach out to your healthcare provider if your symptoms worsen.    For more up to date information, visit this link: COVID-19 (Coronavirus) Frequently Asked Questions and Answers.      Diagnosis: Cough  Diagnosis ICD: R05  "

## 2020-03-14 ENCOUNTER — OFFICE VISIT (OUTPATIENT)
Dept: URGENT CARE | Facility: URGENT CARE | Age: 35
End: 2020-03-14
Payer: COMMERCIAL

## 2020-03-14 DIAGNOSIS — R50.9 FEVER, UNSPECIFIED: Primary | ICD-10-CM

## 2020-03-14 LAB
FLUAV+FLUBV AG SPEC QL: NEGATIVE
FLUAV+FLUBV AG SPEC QL: NEGATIVE
SPECIMEN SOURCE: NORMAL

## 2020-03-14 PROCEDURE — 87804 INFLUENZA ASSAY W/OPTIC: CPT | Performed by: INTERNAL MEDICINE

## 2020-03-14 PROCEDURE — 99213 OFFICE O/P EST LOW 20 MIN: CPT | Performed by: PHYSICIAN ASSISTANT

## 2020-03-14 NOTE — PROGRESS NOTES
SUBJECTIVE:  This 34 year old female presents for COVID-19 testing.  she reports fever, cough, chills, headache, sore throat.  Onset of symptoms was 3/12/20.  Exposure history: none     OBJECTIVE:  Visual assessment shows:  GENERAL APPEARANCE is healthy without evident apparent respiratory distress  BREATHING: the patient is breathing comfortably and is speaking full sentences    ASSESSMENT/PLAN:    ICD-10-CM    1. Fever, unspecified  R50.9 Influenza A & B Antigen     COVID-19 Virus (Coronavirus), PCR - Nasopharyngeal (NP) Swab in [facility]       You are being tested for Corona Virus 19, Influenza and possibly RSV.    Please use the information at the end of this document to sign up for [facility] [employer] where you can get your results and a message about those results sent to you through the [employer] application. If you do not have [facility] [employer] we will call you with your results but it may take longer.    Isolate Yourself:    Isolate yourself while traveling.    Do Not allow any visitors within 6 feet.    Do Not go to work or school.    Do Not go to Confucianism,  centers, shopping, or other public places.    Do Not shake hands.    Avoid close contact with others (hugging, kissing).    Protect Others:    Cover Your Mouth and Nose with a mask, disposable tissue or wash cloth to avoid spreading germs to others.    Wash your hands and face frequently with soap and water    Call Back If: Breathing difficulty develops or you become worse.    For more information about COVID19 and options for caring for yourself at home, please visit the CDC website at https://www.cdc.gov/coronavirus/2019-ncov/about/steps-when-sick.html  For more options for care at [facility], please visit our website at [email or web address]

## 2020-03-14 NOTE — PATIENT INSTRUCTIONS
You are being tested for Corona Virus 19, Influenza and possibly RSV.    Please use the information at the end of this document to sign up for [facility] [employer] where you can get your results and a message about those results sent to you through the [employer] application. If you do not have mychart we will call you with your results but it may take longer.    Isolate Yourself:    Isolate yourself while traveling.    Do Not allow any visitors within 6 feet.    Do Not go to work or school.    Do Not go to Yazidism,  centers, shopping, or other public places.    Do Not shake hands.    Avoid close contact with others (hugging, kissing).    Protect Others:    Cover Your Mouth and Nose with a mask, disposable tissue or wash cloth to avoid spreading germs to others.    Wash your hands and face frequently with soap and water    Call Back If: Breathing difficulty develops or you become worse.    For more information about COVID19 and options for caring for yourself at home, please visit the CDC website at https://www.cdc.gov/coronavirus/2019-ncov/about/steps-when-sick.html  For more options for care at [facility], please visit our website at [email or web address]

## 2020-03-17 ENCOUNTER — NURSE TRIAGE (OUTPATIENT)
Dept: NURSING | Facility: CLINIC | Age: 35
End: 2020-03-17

## 2020-03-17 NOTE — TELEPHONE ENCOUNTER
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  Today Babita is calling for covid 19 lab result.  FNA relayed message the test is still in process.    Additional Information    Negative: Nursing judgment, per information in Reference    Negative: Information only call about a Well Adult (no illness or injury)    Negative: Nursing judgment or information in reference    Negative: Nursing judgment or information in reference    Negative: Nursing judgment or information in reference    Negative: Nursing judgment or information in reference    Negative: Nursing judgment or information in reference    Negative: Nursing judgment or information in reference    Negative: Nursing judgment or information in reference    Negative: Nursing judgment or information in reference    Negative: Nursing judgment or information in reference    Negative: Nursing judgment or information in reference    Negative: Nursing judgment or information in reference    Negative: Nursing judgment or information in reference    Negative: Nursing judgment or information in reference    Nursing judgment or information in reference    Protocols used: NO GUIDELINE [id number]

## 2020-03-18 NOTE — TELEPHONE ENCOUNTER
Coronavirus (COVID-19) Notification    Reason for call  Notify of Negative COVID-19 lab result, assess symptoms,  review [facility] recommendations    Lab Result    Lab test 2019-nCoV rRt-PCR  Oropharyngeal AND/OR nasopharyngeal swabs were NEGATIVE for 2019-nCoV RNA        RN Recommendations/Instructions per [facility]  Patient notified of Negative COVID-19.    Patient can discontinue Quarantee and is free to resume normal activites.  If Patient has questions that you are not able to answer they can contact PCP or MD hotline ([phone])    Please Contact your PCP clinic or return to the Emergency department if your:    Symptoms worsen or other concerning symptom's.      [RN/LPN Name]  Evelyn Hu LPN

## 2020-03-21 LAB
COVID-19 VIRUS PCR RESULT FROM MDH: NEGATIVE
LAB SCANNED RESULT: NORMAL

## 2020-03-24 DIAGNOSIS — L70.0 ACNE VULGARIS: Primary | ICD-10-CM

## 2020-03-24 RX ORDER — SPIRONOLACTONE 100 MG/1
100 TABLET, FILM COATED ORAL DAILY
Qty: 90 TABLET | Refills: 0 | Status: SHIPPED | OUTPATIENT
Start: 2020-03-24 | End: 2020-12-01

## 2020-03-24 NOTE — TELEPHONE ENCOUNTER
Routing refill request to provider for review/approval because:  Medication is reported/historical- reported by [facility]

## 2020-03-24 NOTE — TELEPHONE ENCOUNTER
"spironolactone (ALDACTONE) 100 MG tablet    1/21/2020      Last Written Prescription Date:  1/21/2020  Last Fill Quantity: Unknown,  # refills: Unknown    Last office visit: 12/16/2019 with prescribing provider: KEON Evans   Future Office Visit: Unknown    Requested Prescriptions   Pending Prescriptions Disp Refills     spironolactone (ALDACTONE) 100 MG tablet [Pharmacy Med Name: SPIRONOLACTONE 100MG TABLETS] 90 tablet      Sig: TAKE 1 TABLET BY MOUTH EVERY DAY       Diuretics (Including Combos) Protocol Passed - 3/24/2020  1:15 PM        Passed - Blood pressure under 140/90 in past 12 months     BP Readings from Last 3 Encounters:   02/24/20 130/78   02/10/20 108/75   01/17/20 112/70                 Passed - Recent (12 mo) or future (30 days) visit within the authorizing provider's specialty     Patient has had an office visit with the authorizing provider or a provider within the authorizing providers department within the previous 12 mos or has a future within next 30 days. See \"Patient Info\" tab in inbasket, or \"Choose Columns\" in Meds & Orders section of the refill encounter.              Passed - Medication is active on med list        Passed - Patient is age 18 or older        Passed - No active pregancy on record        Passed - Normal serum creatinine on file in past 12 months     Recent Labs   Lab Test 09/26/19  1617   CR 0.77              Passed - Normal serum potassium on file in past 12 months     Recent Labs   Lab Test 09/26/19  1617   POTASSIUM 4.0                    Passed - Normal serum sodium on file in past 12 months     Recent Labs   Lab Test 09/26/19  1617                 Passed - No positive pregnancy test in past 12 months             "

## 2020-03-31 DIAGNOSIS — E03.9 HYPOTHYROIDISM, UNSPECIFIED TYPE: ICD-10-CM

## 2020-03-31 NOTE — TELEPHONE ENCOUNTER
"Last Written Prescription Date:  1/28/20  Last Fill Quantity: 90 tablet,  # refills: 1   Last office visit: 12/16/2019 with prescribing provider:  Nathan   Future Office Visit:      Requested Prescriptions   Pending Prescriptions Disp Refills     levothyroxine (SYNTHROID/LEVOTHROID) 125 MCG tablet [Pharmacy Med Name: LEVOTHYROXINE 0.125MG (125MCG) TAB] 90 tablet 1     Sig: TAKE 1 TABLET(125 MCG) BY MOUTH DAILY       Thyroid Protocol Passed - 3/31/2020  4:32 PM        Passed - Patient is 12 years or older        Passed - Recent (12 mo) or future (30 days) visit within the authorizing provider's specialty     Patient has had an office visit with the authorizing provider or a provider within the authorizing providers department within the previous 12 mos or has a future within next 30 days. See \"Patient Info\" tab in inbasket, or \"Choose Columns\" in Meds & Orders section of the refill encounter.              Passed - Medication is active on med list        Passed - Normal TSH on file in past 12 months     Recent Labs   Lab Test 09/26/19  1617   TSH 0.50              Passed - No active pregnancy on record     If patient is pregnant or has had a positive pregnancy test, please check TSH.          Passed - No positive pregnancy test in past 12 months     If patient is pregnant or has had a positive pregnancy test, please check TSH.               "

## 2020-04-01 RX ORDER — LEVOTHYROXINE SODIUM 125 UG/1
TABLET ORAL
Start: 2020-04-01

## 2020-04-09 ENCOUNTER — MYC REFILL (OUTPATIENT)
Dept: FAMILY MEDICINE | Facility: CLINIC | Age: 35
End: 2020-04-09

## 2020-04-09 DIAGNOSIS — F90.0 ADHD (ATTENTION DEFICIT HYPERACTIVITY DISORDER), INATTENTIVE TYPE: ICD-10-CM

## 2020-04-10 ENCOUNTER — TELEPHONE (OUTPATIENT)
Dept: PHYSICAL THERAPY | Facility: CLINIC | Age: 35
End: 2020-04-10

## 2020-04-10 RX ORDER — DEXTROAMPHETAMINE SACCHARATE, AMPHETAMINE ASPARTATE, DEXTROAMPHETAMINE SULFATE AND AMPHETAMINE SULFATE 3.75; 3.75; 3.75; 3.75 MG/1; MG/1; MG/1; MG/1
15 TABLET ORAL 2 TIMES DAILY
Qty: 60 TABLET | Refills: 0 | Status: SHIPPED | OUTPATIENT
Start: 2020-04-10 | End: 2020-05-19

## 2020-04-10 NOTE — TELEPHONE ENCOUNTER
Last Written Prescription Date:  3/4/2020  Last Fill Quantity: 60,  # refills: 0   Last office visit: 12/16/2019 with prescribing provider:  12/16/19   Future Office Visit:  None    Routing refill request to provider for review/approval because:  Controlled sub/no RN authorization.    OV up to date; please review and sign in PCP absence.      Thank you,  Mirian Douglass RN on 4/10/2020 at 9:42 AM     Left arm;

## 2020-04-10 NOTE — TELEPHONE ENCOUNTER
Pt reports she does not need PT services at this time.  She is managing her neck pain on her own well.

## 2020-05-12 ENCOUNTER — MYC MEDICAL ADVICE (OUTPATIENT)
Dept: DERMATOLOGY | Facility: CLINIC | Age: 35
End: 2020-05-12

## 2020-05-12 ENCOUNTER — TELEPHONE (OUTPATIENT)
Dept: FAMILY MEDICINE | Facility: CLINIC | Age: 35
End: 2020-05-12

## 2020-05-12 DIAGNOSIS — Z12.83 SKIN CANCER SCREENING: Primary | ICD-10-CM

## 2020-05-12 NOTE — TELEPHONE ENCOUNTER
Hi,   Pt has an upcoming appt with Dr Hough the Dermatologist on June 2nd. She does need a referral put in please for it.

## 2020-05-12 NOTE — TELEPHONE ENCOUNTER
Please give me more information   Reason for referral?  Location of clinic   Dr.Nasima Nathan MD

## 2020-05-22 NOTE — TELEPHONE ENCOUNTER
Called and left general message with clinic number; requesting a call back regarding a 6/2/20 appointment. When call is returned please transfer to Dermatology Department. Madeleine Grimm MA

## 2020-05-26 NOTE — TELEPHONE ENCOUNTER
LVM for patient to call back confirming that she is aware that the 6/2 appt needs to be cancelled- she will have to schedule for an in person office visit in July or later.  We are only doing telephone visits at this time and a skin ck neeeds to be done in office.    Jeannine Pablo [facility] 5/26/2020 10:53 AM       Please relay message above if patient calls back    Jeannine Pablo [facility] 5/26/2020 10:53 AM

## 2020-06-02 ENCOUNTER — OFFICE VISIT (OUTPATIENT)
Dept: DERMATOLOGY | Facility: CLINIC | Age: 35
End: 2020-06-02
Payer: COMMERCIAL

## 2020-06-02 ENCOUNTER — TELEPHONE (OUTPATIENT)
Dept: DERMATOLOGY | Facility: CLINIC | Age: 35
End: 2020-06-02

## 2020-06-02 DIAGNOSIS — L81.6 POIKILODERMA: ICD-10-CM

## 2020-06-02 DIAGNOSIS — L70.0 ACNE VULGARIS: Primary | ICD-10-CM

## 2020-06-02 DIAGNOSIS — D22.9 MULTIPLE NEVI: ICD-10-CM

## 2020-06-02 PROCEDURE — 99203 OFFICE O/P NEW LOW 30 MIN: CPT | Performed by: DERMATOLOGY

## 2020-06-02 RX ORDER — HYDROQUINONE 40 MG/G
CREAM TOPICAL
Qty: 28 G | Refills: 4 | Status: SHIPPED | OUTPATIENT
Start: 2020-06-02

## 2020-06-02 RX ORDER — TRETINOIN 1 MG/G
CREAM TOPICAL
Qty: 20 G | Refills: 11 | Status: SHIPPED | OUTPATIENT
Start: 2020-06-02 | End: 2021-08-20

## 2020-06-02 NOTE — TELEPHONE ENCOUNTER
Received provider approval for patient to come in for an in-clinic visit. Patient completed appointment today. Madeleine Grimm MA

## 2020-06-02 NOTE — PROGRESS NOTES
Dermatology Clinic Note    Dermatology Problem List:  1. Dermal nevi  2. Poikiloderma   3. S/p breast reduction surgery  4. 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 Benign pigmented nevi   6. History of actinic keratosis on the R cheek  7. Acne      Assessment and Plan:  1. Benign nevus of the L medial thigh: reassurance provided that features are benign. Photos obtained.     2. Benign pigmented nevi: No lesions of concern. Sun protection recommended as she is already doing.     3. History of actinic keratosis on the R cheek s/p effudex course. Now with post inflammatory pigment change. I noted that there is no palpable lesion present at the site. The post inflammatory pigment change will fade with time and the tretinoin and hydroquinone with assist with this.     4. Solar lentigines: Continue with tretinoin 0.1% cream nightly as tolerated. Hydroquinone 4% 12 weeks on/ 12 off.     5. History of sunburns and solar damage changes including poikiloderma. Recommended a trial of efudex to the full face and upper chest nightly for 3-4 weeks.    6. Acne vulgaris: Continue tretinoin 0.1% cream nightly.      Thank you for involving me in this patient's care.     Leticia Hough MD  Dermatology Staff    CC: Staci Evans MD  [address] [facility]  [place] [address]    Staci Evans MD    ____________________________________________________________________________________________________________________________________________    CC: Patient presents with:  Consult: new pt, PCP Dr. Evans full body skin check           HPI: Babita Hartley is a 34 year old female presenting for initial evaluation of worrisome moles. 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 No bleeding or tenderness. Has a spot on the R cheek that was treated with efudex for 4 weeks. She had redness and scaling. The area has healed, but she is  worried that there may be squamous cell carcinoma present. No longer scaling, but there is a color and txeture change to the area. No history of skin cancer.       Patient Active Problem List   Diagnosis     CARDIOVASCULAR SCREENING; LDL GOAL LESS THAN 160     Chronic constipation     Insomnia     Anxiety     Mild major depression (H)     Hypothyroidism     ASCUS of cervix with negative high risk HPV     ADHD (attention deficit hyperactivity disorder), inattentive type       Allergies   Allergen Reactions     Weed [Grass] Other (See Comments)     Nasal congestion, rhinitis         Current Outpatient Medications   Medication     [START ON 6/18/2020] amphetamine-dextroamphetamine (ADDERALL) 15 MG tablet     amphetamine-dextroamphetamine (ADDERALL) 15 MG tablet     [START ON 7/18/2020] amphetamine-dextroamphetamine (ADDERALL) 15 MG tablet     levothyroxine (SYNTHROID/LEVOTHROID) 125 MCG tablet     spironolactone (ALDACTONE) 100 MG tablet     No current facility-administered medications for this visit.        Family History   Problem Relation Age of Onset     Hypertension Father      Genitourinary Problems Father         Kidney stones     Lipids Father      Thyroid Disease Father      Cerebrovascular Disease Paternal Grandfather      Cancer Paternal Grandmother         Pancreatic cancer     Cardiovascular Maternal Grandfather         Heart attack     Thyroid Disease Mother      Breast Cancer No family hx of      Colon Cancer No family hx of    Mom with history of squamous cell carcinoma.     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         ROS: Patient in normal state of health and is feeling well on complete ROS.     EXAM:  There were no vitals taken for this visit.  GEN: Alert, no  distress  HEENT: Conjunctiva clear.   PULM: Breathing comfortably on RA  CV: Extrem warm and well perfused  ABD: No distension  MS: No joint deformity  Psych: Normal mood/affect  NEURO: A/O x3  SKIN:   --light brown pink macule on the R mid cheek. No scale or induration  --Scattered medium and dark brown macules on the back arms and legs approx 2-3 mm   --L medial thigh with 3 mm papule brown pink  --Scalp with cherry red papule on the vertex  --Poikilodermatous changes to the anterior chest  --Surgical scars on the chest  --Light brown macules on the forehead and chest

## 2020-06-02 NOTE — TELEPHONE ENCOUNTER
Please call patient back asap regarding appt today at 1:30pm with Dr. Hough which was cancelled. 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 She has a lesion on her leg and she cannot wait to be seen in August. Please call asap to her cell number.

## 2020-06-02 NOTE — TELEPHONE ENCOUNTER
Patient called and explained she needed to come in for an in-clinic appointment. 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 Received provider approval for patient to have an in-clinic office visit. Contacted patient and advised for her to come in for an office visit.  Madeleine Grimm MA

## 2020-06-02 NOTE — PATIENT INSTRUCTIONS
Pediatric Dermatology  [facility]  [address]  1st [place]  [address]  Phone: [phone]    Pediatric & Adult Dermatology  [facility]  [address] [facility]   2nd Floor  [facility] [address]  [phone]                  General information: Dr. Leticia Hough is a board-certified dermatologist with subspecialty certification in pediatric dermatology.     Scheduling and Nurse Triage: Dr. Hough sees pediatric patients on Mondays in [place] and adult and pediatric patients on Tuesdays in [place]. The remainder of the week she practices at the [facility]. Please call the above phone numbers to schedule or to talk to a nurse.     -For scheduling at the [place] or [place] locations, or to talk to the triage nurse please call the above phone number at the clinic where you were seen.     -For medication refills, please call your pharmacy.

## 2020-06-02 NOTE — LETTER
6/2/2020      RE: Babita Hartley  [address]  [place] [address]       Dermatology Clinic Note    Dermatology Problem List:  1. Dermal nevi  2. Poikiloderma   3. S/p breast reduction surgery  4. 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 Benign pigmented nevi   6. History of actinic keratosis on the R cheek  7. Acne      Assessment and Plan:  1. Benign nevus of the L medial thigh: reassurance provided that features are benign. Photos obtained.     2. Benign pigmented nevi: No lesions of concern. Sun protection recommended as she is already doing.     3. History of actinic keratosis on the R cheek s/p effudex course. Now with post inflammatory pigment change. I noted that there is no palpable lesion present at the site. The post inflammatory pigment change will fade with time and the tretinoin and hydroquinone with assist with this.     4. Solar lentigines: Continue with tretinoin 0.1% cream nightly as tolerated. Hydroquinone 4% 12 weeks on/ 12 off.     5. History of sunburns and solar damage changes including poikiloderma. Recommended a trial of efudex to the full face and upper chest nightly for 3-4 weeks.    6. Acne vulgaris: Continue tretinoin 0.1% cream nightly.      Thank you for involving me in this patient's care.     Leticia Hough MD  Dermatology Staff    CC: Staci Evans MD  [address]  [place] [address]        ____________________________________________________________________________________________________________________________________________    CC: Patient presents with:  Consult: new pt, PCP Dr. Evans full body skin check           HPI: Babita Hartley is a 34 year old female presenting for initial evaluation of worrisome moles. 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 No bleeding or tenderness. Has a spot on the R cheek that was treated with efudex for 4 weeks. She had  redness and scaling. The area has healed, but she is worried that there may be squamous cell carcinoma present. No longer scaling, but there is a color and txeture change to the area. No history of skin cancer.       Patient Active Problem List   Diagnosis     CARDIOVASCULAR SCREENING; LDL GOAL LESS THAN 160     Chronic constipation     Insomnia     Anxiety     Mild major depression (H)     Hypothyroidism     ASCUS of cervix with negative high risk HPV     ADHD (attention deficit hyperactivity disorder), inattentive type       Allergies   Allergen Reactions     Weed [Grass] Other (See Comments)     Nasal congestion, rhinitis         Current Outpatient Medications   Medication     [START ON 6/18/2020] amphetamine-dextroamphetamine (ADDERALL) 15 MG tablet     amphetamine-dextroamphetamine (ADDERALL) 15 MG tablet     [START ON 7/18/2020] amphetamine-dextroamphetamine (ADDERALL) 15 MG tablet     levothyroxine (SYNTHROID/LEVOTHROID) 125 MCG tablet     spironolactone (ALDACTONE) 100 MG tablet     No current facility-administered medications for this visit.        Family History   Problem Relation Age of Onset     Hypertension Father      Genitourinary Problems Father         Kidney stones     Lipids Father      Thyroid Disease Father      Cerebrovascular Disease Paternal Grandfather      Cancer Paternal Grandmother         Pancreatic cancer     Cardiovascular Maternal Grandfather         Heart attack     Thyroid Disease Mother      Breast Cancer No family hx of      Colon Cancer No family hx of    Mom with history of squamous cell carcinoma.     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         ROS: Patient in normal state of health and is feeling well on complete ROS.     EXAM:  There were  no vitals taken for this visit.  GEN: Alert, no distress  HEENT: Conjunctiva clear.   PULM: Breathing comfortably on RA  CV: Extrem warm and well perfused  ABD: No distension  MS: No joint deformity  Psych: Normal mood/affect  NEURO: A/O x3  SKIN:   --light brown pink macule on the R mid cheek. No scale or induration  --Scattered medium and dark brown macules on the back arms and legs approx 2-3 mm   --L medial thigh with 3 mm papule brown pink  --Scalp with cherry red papule on the vertex  --Poikilodermatous changes to the anterior chest  --Surgical scars on the chest  --Light brown macules on the forehead and chest            Leticia Hough MD

## 2020-07-20 ENCOUNTER — MYC MEDICAL ADVICE (OUTPATIENT)
Dept: DERMATOLOGY | Facility: CLINIC | Age: 35
End: 2020-07-20

## 2020-07-20 DIAGNOSIS — L65.9 HAIR THINNING: Primary | ICD-10-CM

## 2020-07-21 RX ORDER — BIMATOPROST 3 UG/ML
1 SOLUTION TOPICAL AT BEDTIME
Qty: 5 ML | Refills: 11 | Status: SHIPPED | OUTPATIENT
Start: 2020-07-21 | End: 2021-09-09

## 2020-08-02 ENCOUNTER — MYC REFILL (OUTPATIENT)
Dept: FAMILY MEDICINE | Facility: CLINIC | Age: 35
End: 2020-08-02

## 2020-08-02 DIAGNOSIS — F90.0 ADHD (ATTENTION DEFICIT HYPERACTIVITY DISORDER), INATTENTIVE TYPE: ICD-10-CM

## 2020-08-03 ENCOUNTER — MYC MEDICAL ADVICE (OUTPATIENT)
Dept: FAMILY MEDICINE | Facility: CLINIC | Age: 35
End: 2020-08-03

## 2020-08-04 DIAGNOSIS — F90.0 ADHD (ATTENTION DEFICIT HYPERACTIVITY DISORDER), INATTENTIVE TYPE: ICD-10-CM

## 2020-08-04 RX ORDER — DEXTROAMPHETAMINE SACCHARATE, AMPHETAMINE ASPARTATE, DEXTROAMPHETAMINE SULFATE AND AMPHETAMINE SULFATE 3.75; 3.75; 3.75; 3.75 MG/1; MG/1; MG/1; MG/1
15 TABLET ORAL 2 TIMES DAILY
Qty: 60 TABLET | Refills: 0 | Status: SHIPPED | OUTPATIENT
Start: 2020-08-04 | End: 2020-09-10

## 2020-08-04 RX ORDER — DEXTROAMPHETAMINE SACCHARATE, AMPHETAMINE ASPARTATE, DEXTROAMPHETAMINE SULFATE AND AMPHETAMINE SULFATE 3.75; 3.75; 3.75; 3.75 MG/1; MG/1; MG/1; MG/1
15 TABLET ORAL EVERY MORNING
Qty: 60 TABLET | Refills: 0 | Status: SHIPPED | OUTPATIENT
Start: 2020-08-04 | End: 2020-10-16

## 2020-09-07 ENCOUNTER — MYC REFILL (OUTPATIENT)
Dept: FAMILY MEDICINE | Facility: CLINIC | Age: 35
End: 2020-09-07

## 2020-09-07 DIAGNOSIS — F90.0 ADHD (ATTENTION DEFICIT HYPERACTIVITY DISORDER), INATTENTIVE TYPE: ICD-10-CM

## 2020-09-07 RX ORDER — DEXTROAMPHETAMINE SACCHARATE, AMPHETAMINE ASPARTATE, DEXTROAMPHETAMINE SULFATE AND AMPHETAMINE SULFATE 3.75; 3.75; 3.75; 3.75 MG/1; MG/1; MG/1; MG/1
15 TABLET ORAL EVERY MORNING
Qty: 60 TABLET | Refills: 0 | Status: CANCELLED | OUTPATIENT
Start: 2020-09-07

## 2020-09-10 RX ORDER — DEXTROAMPHETAMINE SACCHARATE, AMPHETAMINE ASPARTATE, DEXTROAMPHETAMINE SULFATE AND AMPHETAMINE SULFATE 3.75; 3.75; 3.75; 3.75 MG/1; MG/1; MG/1; MG/1
15 TABLET ORAL 2 TIMES DAILY
Qty: 60 TABLET | Refills: 0 | Status: SHIPPED | OUTPATIENT
Start: 2020-09-10 | End: 2020-10-16

## 2020-09-10 NOTE — TELEPHONE ENCOUNTER
Adderall 15mg      Last Written Prescription Date:  8/4/2020  Last Fill Quantity: 60,   # refills: 0  Last Office Visit: 1/31/2020 - virtual, f/u 6 months for ADHD    Future Office visit:       Routing refill request to provider for review/approval because:  Drug not on the [facility], [facility] or [facility] refill protocol or controlled substance    2 different directions for this on med list, BID and once a day - both Rxs from 8/4    Tried checking , but site down currently     Message sent to verify directions and notify due for follow up visit     Camille CARETR RN

## 2020-09-10 NOTE — TELEPHONE ENCOUNTER
*Pt confirmed dosing 15mg BID     Routing to provider - pt requesting today if possible     Thank you  Camille CARTER RN

## 2020-09-17 ENCOUNTER — APPOINTMENT (OUTPATIENT)
Dept: URBAN - METROPOLITAN AREA CLINIC 255 | Age: 35
Setting detail: DERMATOLOGY
End: 2020-09-20

## 2020-09-17 ENCOUNTER — APPOINTMENT (OUTPATIENT)
Dept: URBAN - METROPOLITAN AREA CLINIC 255 | Age: 35
Setting detail: DERMATOLOGY
End: 2020-09-17

## 2020-09-17 DIAGNOSIS — L72.8 OTHER FOLLICULAR CYSTS OF THE SKIN AND SUBCUTANEOUS TISSUE: ICD-10-CM

## 2020-09-17 PROCEDURE — OTHER EXCISION: OTHER

## 2020-09-17 PROCEDURE — OTHER COUNSELING: OTHER

## 2020-09-17 PROCEDURE — 11441 EXC FACE-MM B9+MARG 0.6-1 CM: CPT

## 2020-09-17 PROCEDURE — OTHER MIPS QUALITY: OTHER

## 2020-09-17 PROCEDURE — 13131 CMPLX RPR F/C/C/M/N/AX/G/H/F: CPT

## 2020-09-17 ASSESSMENT — LOCATION SIMPLE DESCRIPTION DERM: LOCATION SIMPLE: RIGHT CHEEK

## 2020-09-17 ASSESSMENT — LOCATION DETAILED DESCRIPTION DERM: LOCATION DETAILED: RIGHT LATERAL MALAR CHEEK

## 2020-09-17 ASSESSMENT — LOCATION ZONE DERM: LOCATION ZONE: FACE

## 2020-09-17 NOTE — PROCEDURE: MIPS QUALITY
Quality 226: Preventive Care And Screening: Tobacco Use: Screening And Cessation Intervention: Patient screened for tobacco use and is an ex/non-smoker
Detail Level: Detailed
Quality 110: Preventive Care And Screening: Influenza Immunization: Influenza Immunization previously received during influenza season
Quality 130: Documentation Of Current Medications In The Medical Record: Current Medications Documented
Quality 431: Preventive Care And Screening: Unhealthy Alcohol Use - Screening: Patient screened for unhealthy alcohol use using a single question and scores less than 2 times per year
Quality 265: Biopsy Follow-Up: Biopsy results reviewed, communicated, tracked, and documented

## 2020-09-17 NOTE — PROCEDURE: EXCISION
Double M-Plasty Intermediate Repair Preamble Text (Leave Blank If You Do Not Want): Undermining was performed with blunt dissection.
Complex Repair And Rhombic Flap Text: The defect edges were debeveled with a #15 scalpel blade.  The primary defect was closed partially with a complex linear closure.  Given the location of the remaining defect, shape of the defect and the proximity to free margins a rhombic flap was deemed most appropriate for complete closure of the defect.  Using a sterile surgical marker, an appropriate advancement flap was drawn incorporating the defect and placing the expected incisions within the relaxed skin tension lines where possible.    The area thus outlined was incised deep to adipose tissue with a #15 scalpel blade.  The skin margins were undermined to an appropriate distance in all directions utilizing iris scissors.
Complex Repair And Single Advancement Flap Text: The defect edges were debeveled with a #15 scalpel blade.  The primary defect was closed partially with a complex linear closure.  Given the location of the remaining defect, shape of the defect and the proximity to free margins a single advancement flap was deemed most appropriate for complete closure of the defect.  Using a sterile surgical marker, an appropriate advancement flap was drawn incorporating the defect and placing the expected incisions within the relaxed skin tension lines where possible.    The area thus outlined was incised deep to adipose tissue with a #15 scalpel blade.  The skin margins were undermined to an appropriate distance in all directions utilizing iris scissors.
Advancement Flap (Double) Text: The defect edges were debeveled with a #15 scalpel blade.  Given the location of the defect and the proximity to free margins a double advancement flap was deemed most appropriate.  Using a sterile surgical marker, the appropriate advancement flaps were drawn incorporating the defect and placing the expected incisions within the relaxed skin tension lines where possible.    The area thus outlined was incised deep to adipose tissue with a #15 scalpel blade.  The skin margins were undermined to an appropriate distance in all directions utilizing iris scissors.
A-T Advancement Flap Text: The defect edges were debeveled with a #15 scalpel blade.  Given the location of the defect, shape of the defect and the proximity to free margins an A-T advancement flap was deemed most appropriate.  Using a sterile surgical marker, an appropriate advancement flap was drawn incorporating the defect and placing the expected incisions within the relaxed skin tension lines where possible.    The area thus outlined was incised deep to adipose tissue with a #15 scalpel blade.  The skin margins were undermined to an appropriate distance in all directions utilizing iris scissors.
Where Do You Want The Question To Include Opioid Counseling Located?: Case Summary Tab
Double M-Plasty Complex Repair Preamble Text (Leave Blank If You Do Not Want): Extensive wide undermining was performed.
Dressing: pressure dressing with telfa
Burow's Advancement Flap Text: The defect edges were debeveled with a #15 scalpel blade.  Given the location of the defect and the proximity to free margins a Burow's advancement flap was deemed most appropriate.  Using a sterile surgical marker, the appropriate advancement flap was drawn incorporating the defect and placing the expected incisions within the relaxed skin tension lines where possible.    The area thus outlined was incised deep to adipose tissue with a #15 scalpel blade.  The skin margins were undermined to an appropriate distance in all directions utilizing iris scissors.
Graft Donor Site Bandage (Optional-Leave Blank If You Don't Want In Note): Steri-strips and a pressure bandage were applied to the donor site.
Post-Care Instructions: I reviewed with the patient in detail post-care instructions. Patient is not to engage in any heavy lifting, exercise, or swimming for the next 14 days. Should the patient develop any fevers, chills, bleeding, severe pain patient will contact the office immediately.
Melolabial Transposition Flap Text: The defect edges were debeveled with a #15 scalpel blade.  Given the location of the defect and the proximity to free margins a melolabial flap was deemed most appropriate.  Using a sterile surgical marker, an appropriate melolabial transposition flap was drawn incorporating the defect.    The area thus outlined was incised deep to adipose tissue with a #15 scalpel blade.  The skin margins were undermined to an appropriate distance in all directions utilizing iris scissors.
Did You Provide Opioid Counseling: No
Additional Anesthesia Volume In Cc: 6
Bilateral Helical Rim Advancement Flap Text: The defect edges were debeveled with a #15 blade scalpel.  Given the location of the defect and the proximity to free margins (helical rim) a bilateral helical rim advancement flap was deemed most appropriate.  Using a sterile surgical marker, the appropriate advancement flaps were drawn incorporating the defect and placing the expected incisions between the helical rim and antihelix where possible.  The area thus outlined was incised through and through with a #15 scalpel blade.  With a skin hook and iris scissors, the flaps were gently and sharply undermined and freed up.
Slit Excision Additional Text (Leave Blank If You Do Not Want): A linear line was drawn on the skin overlying the lesion. An incision was made slowly until the lesion was visualized.  Once visualized, the lesion was removed with blunt dissection.
Anesthesia Volume In Cc: 3.5
Composite Graft Text: The defect edges were debeveled with a #15 scalpel blade.  Given the location of the defect, shape of the defect, the proximity to free margins and the fact the defect was full thickness a composite graft was deemed most appropriate.  The defect was outline and then transferred to the donor site.  A full thickness graft was then excised from the donor site. The graft was then placed in the primary defect, oriented appropriately and then sutured into place.  The secondary defect was then repaired using a primary closure.
Use Complex Repair Preambles?: Yes
Purse String (Simple) Text: Given the location of the defect and the characteristics of the surrounding skin a purse string simple closure was deemed most appropriate.  Undermining was performed circumferentially around the surgical defect.  A purse string suture was then placed and tightened.
Hatchet Flap Text: The defect edges were debeveled with a #15 scalpel blade.  Given the location of the defect, shape of the defect and the proximity to free margins a hatchet flap was deemed most appropriate.  Using a sterile surgical marker, an appropriate hatchet flap was drawn incorporating the defect and placing the expected incisions within the relaxed skin tension lines where possible.    The area thus outlined was incised deep to adipose tissue with a #15 scalpel blade.  The skin margins were undermined to an appropriate distance in all directions utilizing iris scissors.
Primary Defect Width (In Cm): 0
W Plasty Text: The lesion was extirpated to the level of the fat with a #15 scalpel blade.  Given the location of the defect, shape of the defect and the proximity to free margins a W-plasty was deemed most appropriate for repair.  Using a sterile surgical marker, the appropriate transposition arms of the W-plasty were drawn incorporating the defect and placing the expected incisions within the relaxed skin tension lines where possible.    The area thus outlined was incised deep to adipose tissue with a #15 scalpel blade.  The skin margins were undermined to an appropriate distance in all directions utilizing iris scissors.  The opposing transposition arms were then transposed into place in opposite direction and anchored with interrupted buried subcutaneous sutures.
Purse String (Intermediate) Text: Given the location of the defect and the characteristics of the surrounding skin a purse string intermediate closure was deemed most appropriate.  Undermining was performed circumferentially around the surgical defect.  A purse string suture was then placed and tightened.
Complex Repair And Split-Thickness Skin Graft Text: The defect edges were debeveled with a #15 scalpel blade.  The primary defect was closed partially with a complex linear closure.  Given the location of the defect, shape of the defect and the proximity to free margins a split thickness skin graft was deemed most appropriate to repair the remaining defect.  The graft was trimmed to fit the size of the remaining defect.  The graft was then placed in the primary defect, oriented appropriately, and sutured into place.
Anesthesia Type: 1% lidocaine with 1:100,000 epinephrine and a 1:10 solution of 8.4% sodium bicarbonate
Complex Repair And Double Advancement Flap Text: The defect edges were debeveled with a #15 scalpel blade.  The primary defect was closed partially with a complex linear closure.  Given the location of the remaining defect, shape of the defect and the proximity to free margins a double advancement flap was deemed most appropriate for complete closure of the defect.  Using a sterile surgical marker, an appropriate advancement flap was drawn incorporating the defect and placing the expected incisions within the relaxed skin tension lines where possible.    The area thus outlined was incised deep to adipose tissue with a #15 scalpel blade.  The skin margins were undermined to an appropriate distance in all directions utilizing iris scissors.
O-Z Flap Text: The defect edges were debeveled with a #15 scalpel blade.  Given the location of the defect, shape of the defect and the proximity to free margins an O-Z flap was deemed most appropriate.  Using a sterile surgical marker, an appropriate transposition flap was drawn incorporating the defect and placing the expected incisions within the relaxed skin tension lines where possible. The area thus outlined was incised deep to adipose tissue with a #15 scalpel blade.  The skin margins were undermined to an appropriate distance in all directions utilizing iris scissors.
Island Pedicle Flap-Requiring Vessel Identification Text: The defect edges were debeveled with a #15 scalpel blade.  Given the location of the defect, shape of the defect and the proximity to free margins an island pedicle advancement flap was deemed most appropriate.  Using a sterile surgical marker, an appropriate advancement flap was drawn, based on the axial vessel mentioned above, incorporating the defect, outlining the appropriate donor tissue and placing the expected incisions within the relaxed skin tension lines where possible.    The area thus outlined was incised deep to adipose tissue with a #15 scalpel blade.  The skin margins were undermined to an appropriate distance in all directions around the primary defect and laterally outward around the island pedicle utilizing iris scissors.  There was minimal undermining beneath the pedicle flap.
Suturegard Retention Suture: 2-0 Nylon
Complex Repair And Modified Advancement Flap Text: The defect edges were debeveled with a #15 scalpel blade.  The primary defect was closed partially with a complex linear closure.  Given the location of the remaining defect, shape of the defect and the proximity to free margins a modified advancement flap was deemed most appropriate for complete closure of the defect.  Using a sterile surgical marker, an appropriate advancement flap was drawn incorporating the defect and placing the expected incisions within the relaxed skin tension lines where possible.    The area thus outlined was incised deep to adipose tissue with a #15 scalpel blade.  The skin margins were undermined to an appropriate distance in all directions utilizing iris scissors.
Saucerization Excision Additional Text (Leave Blank If You Do Not Want): The margin was drawn around the clinically apparent lesion.  Incisions were then made along these lines, in a tangential fashion, to the appropriate tissue plane and the lesion was extirpated.
Size Of Margin In Cm: 0.1
Double O-Z Flap Text: The defect edges were debeveled with a #15 scalpel blade.  Given the location of the defect, shape of the defect and the proximity to free margins a Double O-Z flap was deemed most appropriate.  Using a sterile surgical marker, an appropriate transposition flap was drawn incorporating the defect and placing the expected incisions within the relaxed skin tension lines where possible. The area thus outlined was incised deep to adipose tissue with a #15 scalpel blade.  The skin margins were undermined to an appropriate distance in all directions utilizing iris scissors.
Cartilage Graft Text: The defect edges were debeveled with a #15 scalpel blade.  Given the location of the defect, shape of the defect, the fact the defect involved a full thickness cartilage defect a cartilage graft was deemed most appropriate.  An appropriate donor site was identified, cleansed, and anesthetized. The cartilage graft was then harvested and transferred to the recipient site, oriented appropriately and then sutured into place.  The secondary defect was then repaired using a primary closure.
Complex Repair And O-L Flap Text: The defect edges were debeveled with a #15 scalpel blade.  The primary defect was closed partially with a complex linear closure.  Given the location of the remaining defect, shape of the defect and the proximity to free margins an O-L flap was deemed most appropriate for complete closure of the defect.  Using a sterile surgical marker, an appropriate flap was drawn incorporating the defect and placing the expected incisions within the relaxed skin tension lines where possible.    The area thus outlined was incised deep to adipose tissue with a #15 scalpel blade.  The skin margins were undermined to an appropriate distance in all directions utilizing iris scissors.
Number Of Hemigard Strips Per Side: 1
Retention Suture Bite Size: 3 mm
Excision Depth: adipose tissue
H Plasty Text: Given the location of the defect, shape of the defect and the proximity to free margins a H-plasty was deemed most appropriate for repair.  Using a sterile surgical marker, the appropriate advancement arms of the H-plasty were drawn incorporating the defect and placing the expected incisions within the relaxed skin tension lines where possible. The area thus outlined was incised deep to adipose tissue with a #15 scalpel blade. The skin margins were undermined to an appropriate distance in all directions utilizing iris scissors.  The opposing advancement arms were then advanced into place in opposite direction and anchored with interrupted buried subcutaneous sutures.
Advancement Flap (Single) Text: The defect edges were debeveled with a #15 scalpel blade.  Given the location of the defect and the proximity to free margins a single advancement flap was deemed most appropriate.  Using a sterile surgical marker, an appropriate advancement flap was drawn incorporating the defect and placing the expected incisions within the relaxed skin tension lines where possible.    The area thus outlined was incised deep to adipose tissue with a #15 scalpel blade.  The skin margins were undermined to an appropriate distance in all directions utilizing iris scissors.
Mucosal Advancement Flap Text: Given the location of the defect, shape of the defect and the proximity to free margins a mucosal advancement flap was deemed most appropriate. Incisions were made with a 15 blade scalpel in the appropriate fashion along the cutaneous vermillion border and the mucosal lip. The remaining actinically damaged mucosal tissue was excised.  The mucosal advancement flap was then elevated to the gingival sulcus with care taken to preserve the neurovascular structures and advanced into the primary defect. Care was taken to ensure that precise realignment of the vermilion border was achieved.
Alar Island Pedicle Flap Text: The defect edges were debeveled with a #15 scalpel blade.  Given the location of the defect, shape of the defect and the proximity to the alar rim an island pedicle advancement flap was deemed most appropriate.  Using a sterile surgical marker, an appropriate advancement flap was drawn incorporating the defect, outlining the appropriate donor tissue and placing the expected incisions within the nasal ala running parallel to the alar rim. The area thus outlined was incised with a #15 scalpel blade.  The skin margins were undermined minimally to an appropriate distance in all directions around the primary defect and laterally outward around the island pedicle utilizing iris scissors.  There was minimal undermining beneath the pedicle flap.
Nostril Rim Text: The closure involved the nostril rim.
Suture Removal: 7 days
Complex Repair And Tissue Cultured Epidermal Autograft Text: The defect edges were debeveled with a #15 scalpel blade.  The primary defect was closed partially with a complex linear closure.  Given the location of the defect, shape of the defect and the proximity to free margins an tissue cultured epidermal autograft was deemed most appropriate to repair the remaining defect.  The graft was trimmed to fit the size of the remaining defect.  The graft was then placed in the primary defect, oriented appropriately, and sutured into place.
Banner Transposition Flap Text: The defect edges were debeveled with a #15 scalpel blade.  Given the location of the defect and the proximity to free margins a Banner transposition flap was deemed most appropriate.  Using a sterile surgical marker, an appropriate flap drawn around the defect. The area thus outlined was incised deep to adipose tissue with a #15 scalpel blade.  The skin margins were undermined to an appropriate distance in all directions utilizing iris scissors.
Double O-Z Plasty Text: The defect edges were debeveled with a #15 scalpel blade.  Given the location of the defect, shape of the defect and the proximity to free margins a Double O-Z plasty (double transposition flap) was deemed most appropriate.  Using a sterile surgical marker, the appropriate transposition flaps were drawn incorporating the defect and placing the expected incisions within the relaxed skin tension lines where possible. The area thus outlined was incised deep to adipose tissue with a #15 scalpel blade.  The skin margins were undermined to an appropriate distance in all directions utilizing iris scissors.  Hemostasis was achieved with electrocautery.  The flaps were then transposed into place, one clockwise and the other counterclockwise, and anchored with interrupted buried subcutaneous sutures.
Bi-Rhombic Flap Text: The defect edges were debeveled with a #15 scalpel blade.  Given the location of the defect and the proximity to free margins a bi-rhombic flap was deemed most appropriate.  Using a sterile surgical marker, an appropriate rhombic flap was drawn incorporating the defect. The area thus outlined was incised deep to adipose tissue with a #15 scalpel blade.  The skin margins were undermined to an appropriate distance in all directions utilizing iris scissors.
Hemigard Postcare Instructions: The HEMIGARD strips are to remain completely dry for at least 5-7 days.
Z Plasty Text: The lesion was extirpated to the level of the fat with a #15 scalpel blade.  Given the location of the defect, shape of the defect and the proximity to free margins a Z-plasty was deemed most appropriate for repair.  Using a sterile surgical marker, the appropriate transposition arms of the Z-plasty were drawn incorporating the defect and placing the expected incisions within the relaxed skin tension lines where possible.    The area thus outlined was incised deep to adipose tissue with a #15 scalpel blade.  The skin margins were undermined to an appropriate distance in all directions utilizing iris scissors.  The opposing transposition arms were then transposed into place in opposite direction and anchored with interrupted buried subcutaneous sutures.
Complex Repair And O-T Advancement Flap Text: The defect edges were debeveled with a #15 scalpel blade.  The primary defect was closed partially with a complex linear closure.  Given the location of the remaining defect, shape of the defect and the proximity to free margins an O-T advancement flap was deemed most appropriate for complete closure of the defect.  Using a sterile surgical marker, an appropriate advancement flap was drawn incorporating the defect and placing the expected incisions within the relaxed skin tension lines where possible.    The area thus outlined was incised deep to adipose tissue with a #15 scalpel blade.  The skin margins were undermined to an appropriate distance in all directions utilizing iris scissors.
Helical Rim Advancement Flap Text: The defect edges were debeveled with a #15 blade scalpel.  Given the location of the defect and the proximity to free margins (helical rim) a double helical rim advancement flap was deemed most appropriate.  Using a sterile surgical marker, the appropriate advancement flaps were drawn incorporating the defect and placing the expected incisions between the helical rim and antihelix where possible.  The area thus outlined was incised through and through with a #15 scalpel blade.  With a skin hook and iris scissors, the flaps were gently and sharply undermined and freed up.
Island Pedicle Flap With Canthal Suspension Text: The defect edges were debeveled with a #15 scalpel blade.  Given the location of the defect, shape of the defect and the proximity to free margins an island pedicle advancement flap was deemed most appropriate.  Using a sterile surgical marker, an appropriate advancement flap was drawn incorporating the defect, outlining the appropriate donor tissue and placing the expected incisions within the relaxed skin tension lines where possible. The area thus outlined was incised deep to adipose tissue with a #15 scalpel blade.  The skin margins were undermined to an appropriate distance in all directions around the primary defect and laterally outward around the island pedicle utilizing iris scissors.  There was minimal undermining beneath the pedicle flap. A suspension suture was placed in the canthal tendon to prevent tension and prevent ectropion.
Burow's Graft Text: The defect edges were debeveled with a #15 scalpel blade.  Given the location of the defect, shape of the defect, the proximity to free margins and the presence of a standing cone deformity a Burow's skin graft was deemed most appropriate. The standing cone was removed and this tissue was then trimmed to the shape of the primary defect. The adipose tissue was also removed until only dermis and epidermis were left.  The skin margins of the secondary defect were undermined to an appropriate distance in all directions utilizing iris scissors.  The secondary defect was closed with interrupted buried subcutaneous sutures.  The skin edges were then re-apposed with running  sutures.  The skin graft was then placed in the primary defect and oriented appropriately.
Consent was obtained from the patient. The risks and benefits to therapy were discussed in detail. Specifically, the risks of infection, scarring, bleeding, prolonged wound healing, incomplete removal, allergy to anesthesia, nerve injury and recurrence were addressed. Prior to the procedure, the treatment site was clearly identified and confirmed by the patient. All components of Universal Protocol/PAUSE Rule completed.
Hemigard Intro: Due to skin fragility and wound tension, it was decided to use HEMIGARD adhesive retention suture devices to permit a linear closure. The skin was cleaned and dried for a 6cm distance away from the wound. Excessive hair, if present, was removed to allow for adhesion.
O-L Flap Text: The defect edges were debeveled with a #15 scalpel blade.  Given the location of the defect, shape of the defect and the proximity to free margins an O-L flap was deemed most appropriate.  Using a sterile surgical marker, an appropriate advancement flap was drawn incorporating the defect and placing the expected incisions within the relaxed skin tension lines where possible.    The area thus outlined was incised deep to adipose tissue with a #15 scalpel blade.  The skin margins were undermined to an appropriate distance in all directions utilizing iris scissors.
Rhomboid Transposition Flap Text: The defect edges were debeveled with a #15 scalpel blade.  Given the location of the defect and the proximity to free margins a rhomboid transposition flap was deemed most appropriate.  Using a sterile surgical marker, an appropriate rhomboid flap was drawn incorporating the defect.    The area thus outlined was incised deep to adipose tissue with a #15 scalpel blade.  The skin margins were undermined to an appropriate distance in all directions utilizing iris scissors.
Cheek Interpolation Flap Text: A decision was made to reconstruct the defect utilizing an interpolation axial flap and a staged reconstruction.  A telfa template was made of the defect.  This telfa template was then used to outline the Cheek Interpolation flap.  The donor area for the pedicle flap was then injected with anesthesia.  The flap was excised through the skin and subcutaneous tissue down to the layer of the underlying musculature.  The interpolation flap was carefully excised within this deep plane to maintain its blood supply.  The edges of the donor site were undermined.   The donor site was closed in a primary fashion.  The pedicle was then rotated into position and sutured.  Once the tube was sutured into place, adequate blood supply was confirmed with blanching and refill.  The pedicle was then wrapped with xeroform gauze and dressed appropriately with a telfa and gauze bandage to ensure continued blood supply and protect the attached pedicle.
Complex Repair And Double M Plasty Text: The defect edges were debeveled with a #15 scalpel blade.  The primary defect was closed partially with a complex linear closure.  Given the location of the remaining defect, shape of the defect and the proximity to free margins a double M plasty was deemed most appropriate for complete closure of the defect.  Using a sterile surgical marker, an appropriate advancement flap was drawn incorporating the defect and placing the expected incisions within the relaxed skin tension lines where possible.    The area thus outlined was incised deep to adipose tissue with a #15 scalpel blade.  The skin margins were undermined to an appropriate distance in all directions utilizing iris scissors.
Complex Repair And A-T Advancement Flap Text: The defect edges were debeveled with a #15 scalpel blade.  The primary defect was closed partially with a complex linear closure.  Given the location of the remaining defect, shape of the defect and the proximity to free margins an A-T advancement flap was deemed most appropriate for complete closure of the defect.  Using a sterile surgical marker, an appropriate advancement flap was drawn incorporating the defect and placing the expected incisions within the relaxed skin tension lines where possible.    The area thus outlined was incised deep to adipose tissue with a #15 scalpel blade.  The skin margins were undermined to an appropriate distance in all directions utilizing iris scissors.
Complex Repair And Skin Substitute Graft Text: The defect edges were debeveled with a #15 scalpel blade.  The primary defect was closed partially with a complex linear closure.  Given the location of the remaining defect, shape of the defect and the proximity to free margins a skin substitute graft was deemed most appropriate to repair the remaining defect.  The graft was trimmed to fit the size of the remaining defect.  The graft was then placed in the primary defect, oriented appropriately, and sutured into place.
Dorsal Nasal Flap Text: The defect edges were debeveled with a #15 scalpel blade.  Given the location of the defect and the proximity to free margins a dorsal nasal flap was deemed most appropriate.  Using a sterile surgical marker, an appropriate dorsal nasal flap was drawn around the defect.    The area thus outlined was incised deep to adipose tissue with a #15 scalpel blade.  The skin margins were undermined to an appropriate distance in all directions utilizing iris scissors.
Undermining Type: Entire Wound
Hemostasis: Electrodesiccation
Eliptical Excision Additional Text (Leave Blank If You Do Not Want): The margin was drawn around the clinically apparent lesion.  An elliptical shape was then drawn on the skin incorporating the lesion and margins.  Incisions were then made along these lines to the appropriate tissue plane and the lesion was extirpated.
Complex Repair And V-Y Plasty Text: The defect edges were debeveled with a #15 scalpel blade.  The primary defect was closed partially with a complex linear closure.  Given the location of the remaining defect, shape of the defect and the proximity to free margins a V-Y plasty was deemed most appropriate for complete closure of the defect.  Using a sterile surgical marker, an appropriate advancement flap was drawn incorporating the defect and placing the expected incisions within the relaxed skin tension lines where possible.    The area thus outlined was incised deep to adipose tissue with a #15 scalpel blade.  The skin margins were undermined to an appropriate distance in all directions utilizing iris scissors.
Medical Necessity Clause: This procedure was medically necessary because the lesion that was treated was:
X Size Of Lesion In Cm (Optional): 0.6
Double Island Pedicle Flap Text: The defect edges were debeveled with a #15 scalpel blade.  Given the location of the defect, shape of the defect and the proximity to free margins a double island pedicle advancement flap was deemed most appropriate.  Using a sterile surgical marker, an appropriate advancement flap was drawn incorporating the defect, outlining the appropriate donor tissue and placing the expected incisions within the relaxed skin tension lines where possible.    The area thus outlined was incised deep to adipose tissue with a #15 scalpel blade.  The skin margins were undermined to an appropriate distance in all directions around the primary defect and laterally outward around the island pedicle utilizing iris scissors.  There was minimal undermining beneath the pedicle flap.
Split-Thickness Skin Graft Text: The defect edges were debeveled with a #15 scalpel blade.  Given the location of the defect, shape of the defect and the proximity to free margins a split thickness skin graft was deemed most appropriate.  Using a sterile surgical marker, the primary defect shape was transferred to the donor site. The split thickness graft was then harvested.  The skin graft was then placed in the primary defect and oriented appropriately.
Complex Repair And Ftsg Text: The defect edges were debeveled with a #15 scalpel blade.  The primary defect was closed partially with a complex linear closure.  Given the location of the defect, shape of the defect and the proximity to free margins a full thickness skin graft was deemed most appropriate to repair the remaining defect.  The graft was trimmed to fit the size of the remaining defect.  The graft was then placed in the primary defect, oriented appropriately, and sutured into place.
Island Pedicle Flap Text: The defect edges were debeveled with a #15 scalpel blade.  Given the location of the defect, shape of the defect and the proximity to free margins an island pedicle advancement flap was deemed most appropriate.  Using a sterile surgical marker, an appropriate advancement flap was drawn incorporating the defect, outlining the appropriate donor tissue and placing the expected incisions within the relaxed skin tension lines where possible.    The area thus outlined was incised deep to adipose tissue with a #15 scalpel blade.  The skin margins were undermined to an appropriate distance in all directions around the primary defect and laterally outward around the island pedicle utilizing iris scissors.  There was minimal undermining beneath the pedicle flap.
Melolabial Interpolation Flap Text: A decision was made to reconstruct the defect utilizing an interpolation axial flap and a staged reconstruction.  A telfa template was made of the defect.  This telfa template was then used to outline the melolabial interpolation flap.  The donor area for the pedicle flap was then injected with anesthesia.  The flap was excised through the skin and subcutaneous tissue down to the layer of the underlying musculature.  The pedicle flap was carefully excised within this deep plane to maintain its blood supply.  The edges of the donor site were undermined.   The donor site was closed in a primary fashion.  The pedicle was then rotated into position and sutured.  Once the tube was sutured into place, adequate blood supply was confirmed with blanching and refill.  The pedicle was then wrapped with xeroform gauze and dressed appropriately with a telfa and gauze bandage to ensure continued blood supply and protect the attached pedicle.
Complex Repair And Xenograft Text: The defect edges were debeveled with a #15 scalpel blade.  The primary defect was closed partially with a complex linear closure.  Given the location of the defect, shape of the defect and the proximity to free margins a xenograft was deemed most appropriate to repair the remaining defect.  The graft was trimmed to fit the size of the remaining defect.  The graft was then placed in the primary defect, oriented appropriately, and sutured into place.
No Repair - Repaired With Adjacent Surgical Defect Text (Leave Blank If You Do Not Want): After the excision the defect was repaired concurrently with another surgical defect which was in close approximation.
Deep Sutures: 6-0 Monocryl
Lip Wedge Excision Repair Text: Given the location of the defect and the proximity to free margins a full thickness wedge repair was deemed most appropriate.  Using a sterile surgical marker, the appropriate repair was drawn incorporating the defect and placing the expected incisions perpendicular to the vermilion border.  The vermilion border was also meticulously outlined to ensure appropriate reapproximation during the repair.  The area thus outlined was incised through and through with a #15 scalpel blade.  The muscularis and dermis were reaproximated with deep sutures following hemostasis. Care was taken to realign the vermilion border before proceeding with the superficial closure.  Once the vermilion was realigned the superfical and mucosal closure was finished.
Complex Repair And Burow's Graft Text: The defect edges were debeveled with a #15 scalpel blade.  The primary defect was closed partially with a complex linear closure.  Given the location of the defect, shape of the defect, the proximity to free margins and the presence of a standing cone deformity a Burow's graft was deemed most appropriate to repair the remaining defect.  The graft was trimmed to fit the size of the remaining defect.  The graft was then placed in the primary defect, oriented appropriately, and sutured into place.
Chonodrocutaneous Helical Advancement Flap Text: The defect edges were debeveled with a #15 scalpel blade.  Given the location of the defect and the proximity to free margins a chondrocutaneous helical advancement flap was deemed most appropriate.  Using a sterile surgical marker, the appropriate advancement flap was drawn incorporating the defect and placing the expected incisions within the relaxed skin tension lines where possible.    The area thus outlined was incised deep to adipose tissue with a #15 scalpel blade.  The skin margins were undermined to an appropriate distance in all directions utilizing iris scissors.
Anesthesia Type: 1% lidocaine with epinephrine and a 1:10 solution of 8.4% sodium bicarbonate
Complex Repair And Bilobe Flap Text: The defect edges were debeveled with a #15 scalpel blade.  The primary defect was closed partially with a complex linear closure.  Given the location of the remaining defect, shape of the defect and the proximity to free margins a bilobe flap was deemed most appropriate for complete closure of the defect.  Using a sterile surgical marker, an appropriate advancement flap was drawn incorporating the defect and placing the expected incisions within the relaxed skin tension lines where possible.    The area thus outlined was incised deep to adipose tissue with a #15 scalpel blade.  The skin margins were undermined to an appropriate distance in all directions utilizing iris scissors.
Intermediate / Complex Repair - Final Wound Length In Cm: 1.1
Fusiform Excision Additional Text (Leave Blank If You Do Not Want): The margin was drawn around the clinically apparent lesion.  A fusiform shape was then drawn on the skin incorporating the lesion and margins.  Incisions were then made along these lines to the appropriate tissue plane and the lesion was extirpated.
Size Of Lesion In Cm: 0.8
Cheek-To-Nose Interpolation Flap Text: A decision was made to reconstruct the defect utilizing an interpolation axial flap and a staged reconstruction.  A telfa template was made of the defect.  This telfa template was then used to outline the Cheek-To-Nose Interpolation flap.  The donor area for the pedicle flap was then injected with anesthesia.  The flap was excised through the skin and subcutaneous tissue down to the layer of the underlying musculature.  The interpolation flap was carefully excised within this deep plane to maintain its blood supply.  The edges of the donor site were undermined.   The donor site was closed in a primary fashion.  The pedicle was then rotated into position and sutured.  Once the tube was sutured into place, adequate blood supply was confirmed with blanching and refill.  The pedicle was then wrapped with xeroform gauze and dressed appropriately with a telfa and gauze bandage to ensure continued blood supply and protect the attached pedicle.
Mercedes Flap Text: The defect edges were debeveled with a #15 scalpel blade.  Given the location of the defect, shape of the defect and the proximity to free margins a Mercedes flap was deemed most appropriate.  Using a sterile surgical marker, an appropriate advancement flap was drawn incorporating the defect and placing the expected incisions within the relaxed skin tension lines where possible. The area thus outlined was incised deep to adipose tissue with a #15 scalpel blade.  The skin margins were undermined to an appropriate distance in all directions utilizing iris scissors.
Paramedian Forehead Flap Text: A decision was made to reconstruct the defect utilizing an interpolation axial flap and a staged reconstruction.  A telfa template was made of the defect.  This telfa template was then used to outline the paramedian forehead pedicle flap.  The donor area for the pedicle flap was then injected with anesthesia.  The flap was excised through the skin and subcutaneous tissue down to the layer of the underlying musculature.  The pedicle flap was carefully excised within this deep plane to maintain its blood supply.  The edges of the donor site were undermined.   The donor site was closed in a primary fashion.  The pedicle was then rotated into position and sutured.  Once the tube was sutured into place, adequate blood supply was confirmed with blanching and refill.  The pedicle was then wrapped with xeroform gauze and dressed appropriately with a telfa and gauze bandage to ensure continued blood supply and protect the attached pedicle.
Length To Time In Minutes Device Was In Place: 10
Scalpel Size: 15 blade
Interpolation Flap Text: A decision was made to reconstruct the defect utilizing an interpolation axial flap and a staged reconstruction.  A telfa template was made of the defect.  This telfa template was then used to outline the interpolation flap.  The donor area for the pedicle flap was then injected with anesthesia.  The flap was excised through the skin and subcutaneous tissue down to the layer of the underlying musculature.  The interpolation flap was carefully excised within this deep plane to maintain its blood supply.  The edges of the donor site were undermined.   The donor site was closed in a primary fashion.  The pedicle was then rotated into position and sutured.  Once the tube was sutured into place, adequate blood supply was confirmed with blanching and refill.  The pedicle was then wrapped with xeroform gauze and dressed appropriately with a telfa and gauze bandage to ensure continued blood supply and protect the attached pedicle.
Bilobed Transposition Flap Text: The defect edges were debeveled with a #15 scalpel blade.  Given the location of the defect and the proximity to free margins a bilobed transposition flap was deemed most appropriate.  Using a sterile surgical marker, an appropriate bilobe flap drawn around the defect.    The area thus outlined was incised deep to adipose tissue with a #15 scalpel blade.  The skin margins were undermined to an appropriate distance in all directions utilizing iris scissors.
Modified Advancement Flap Text: The defect edges were debeveled with a #15 scalpel blade.  Given the location of the defect, shape of the defect and the proximity to free margins a modified advancement flap was deemed most appropriate.  Using a sterile surgical marker, an appropriate advancement flap was drawn incorporating the defect and placing the expected incisions within the relaxed skin tension lines where possible.    The area thus outlined was incised deep to adipose tissue with a #15 scalpel blade.  The skin margins were undermined to an appropriate distance in all directions utilizing iris scissors.
V-Y Plasty Text: The defect edges were debeveled with a #15 scalpel blade.  Given the location of the defect, shape of the defect and the proximity to free margins an V-Y advancement flap was deemed most appropriate.  Using a sterile surgical marker, an appropriate advancement flap was drawn incorporating the defect and placing the expected incisions within the relaxed skin tension lines where possible.    The area thus outlined was incised deep to adipose tissue with a #15 scalpel blade.  The skin margins were undermined to an appropriate distance in all directions utilizing iris scissors.
Repair Type: Complex
Epidermal Sutures: 6-0 Surgipro
Medical Necessity Information: It is in your best interest to select a reason for this procedure from the list below. All of these items fulfill various CMS LCD requirements except lesion extends to a margin.
Rhombic Flap Text: The defect edges were debeveled with a #15 scalpel blade.  Given the location of the defect and the proximity to free margins a rhombic flap was deemed most appropriate.  Using a sterile surgical marker, an appropriate rhombic flap was drawn incorporating the defect.    The area thus outlined was incised deep to adipose tissue with a #15 scalpel blade.  The skin margins were undermined to an appropriate distance in all directions utilizing iris scissors.
Mastoid Interpolation Flap Text: A decision was made to reconstruct the defect utilizing an interpolation axial flap and a staged reconstruction.  A telfa template was made of the defect.  This telfa template was then used to outline the mastoid interpolation flap.  The donor area for the pedicle flap was then injected with anesthesia.  The flap was excised through the skin and subcutaneous tissue down to the layer of the underlying musculature.  The pedicle flap was carefully excised within this deep plane to maintain its blood supply.  The edges of the donor site were undermined.   The donor site was closed in a primary fashion.  The pedicle was then rotated into position and sutured.  Once the tube was sutured into place, adequate blood supply was confirmed with blanching and refill.  The pedicle was then wrapped with xeroform gauze and dressed appropriately with a telfa and gauze bandage to ensure continued blood supply and protect the attached pedicle.
Tissue Cultured Epidermal Autograft Text: The defect edges were debeveled with a #15 scalpel blade.  Given the location of the defect, shape of the defect and the proximity to free margins a tissue cultured epidermal autograft was deemed most appropriate.  The graft was then trimmed to fit the size of the defect.  The graft was then placed in the primary defect and oriented appropriately.
Suturegard Body: The suture ends were repeatedly re-tightened and re-clamped to achieve the desired tissue expansion.
Transposition Flap Text: The defect edges were debeveled with a #15 scalpel blade.  Given the location of the defect and the proximity to free margins a transposition flap was deemed most appropriate.  Using a sterile surgical marker, an appropriate transposition flap was drawn incorporating the defect.    The area thus outlined was incised deep to adipose tissue with a #15 scalpel blade.  The skin margins were undermined to an appropriate distance in all directions utilizing iris scissors.
Posterior Auricular Interpolation Flap Text: A decision was made to reconstruct the defect utilizing an interpolation axial flap and a staged reconstruction.  A telfa template was made of the defect.  This telfa template was then used to outline the posterior auricular interpolation flap.  The donor area for the pedicle flap was then injected with anesthesia.  The flap was excised through the skin and subcutaneous tissue down to the layer of the underlying musculature.  The pedicle flap was carefully excised within this deep plane to maintain its blood supply.  The edges of the donor site were undermined.   The donor site was closed in a primary fashion.  The pedicle was then rotated into position and sutured.  Once the tube was sutured into place, adequate blood supply was confirmed with blanching and refill.  The pedicle was then wrapped with xeroform gauze and dressed appropriately with a telfa and gauze bandage to ensure continued blood supply and protect the attached pedicle.
Xenograft Text: The defect edges were debeveled with a #15 scalpel blade.  Given the location of the defect, shape of the defect and the proximity to free margins a xenograft was deemed most appropriate.  The graft was then trimmed to fit the size of the defect.  The graft was then placed in the primary defect and oriented appropriately.
Epidermal Closure: running
Complex Repair And Melolabial Flap Text: The defect edges were debeveled with a #15 scalpel blade.  The primary defect was closed partially with a complex linear closure.  Given the location of the remaining defect, shape of the defect and the proximity to free margins a melolabial flap was deemed most appropriate for complete closure of the defect.  Using a sterile surgical marker, an appropriate advancement flap was drawn incorporating the defect and placing the expected incisions within the relaxed skin tension lines where possible.    The area thus outlined was incised deep to adipose tissue with a #15 scalpel blade.  The skin margins were undermined to an appropriate distance in all directions utilizing iris scissors.
Rotation Flap Text: The defect edges were debeveled with a #15 scalpel blade.  Given the location of the defect, shape of the defect and the proximity to free margins a rotation flap was deemed most appropriate.  Using a sterile surgical marker, an appropriate rotation flap was drawn incorporating the defect and placing the expected incisions within the relaxed skin tension lines where possible.    The area thus outlined was incised deep to adipose tissue with a #15 scalpel blade.  The skin margins were undermined to an appropriate distance in all directions utilizing iris scissors.
Bilobed Flap Text: The defect edges were debeveled with a #15 scalpel blade.  Given the location of the defect and the proximity to free margins a bilobe flap was deemed most appropriate.  Using a sterile surgical marker, an appropriate bilobe flap drawn around the defect.    The area thus outlined was incised deep to adipose tissue with a #15 scalpel blade.  The skin margins were undermined to an appropriate distance in all directions utilizing iris scissors.
Zygomaticofacial Flap Text: Given the location of the defect, shape of the defect and the proximity to free margins a zygomaticofacial flap was deemed most appropriate for repair.  Using a sterile surgical marker, the appropriate flap was drawn incorporating the defect and placing the expected incisions within the relaxed skin tension lines where possible. The area thus outlined was incised deep to adipose tissue with a #15 scalpel blade with preservation of a vascular pedicle.  The skin margins were undermined to an appropriate distance in all directions utilizing iris scissors.  The flap was then placed into the defect and anchored with interrupted buried subcutaneous sutures.
Complex Repair And M Plasty Text: The defect edges were debeveled with a #15 scalpel blade.  The primary defect was closed partially with a complex linear closure.  Given the location of the remaining defect, shape of the defect and the proximity to free margins an M plasty was deemed most appropriate for complete closure of the defect.  Using a sterile surgical marker, an appropriate advancement flap was drawn incorporating the defect and placing the expected incisions within the relaxed skin tension lines where possible.    The area thus outlined was incised deep to adipose tissue with a #15 scalpel blade.  The skin margins were undermined to an appropriate distance in all directions utilizing iris scissors.
Estimated Blood Loss (Cc): minimal
Complex Repair And Dermal Autograft Text: The defect edges were debeveled with a #15 scalpel blade.  The primary defect was closed partially with a complex linear closure.  Given the location of the defect, shape of the defect and the proximity to free margins an dermal autograft was deemed most appropriate to repair the remaining defect.  The graft was trimmed to fit the size of the remaining defect.  The graft was then placed in the primary defect, oriented appropriately, and sutured into place.
Suturegard Intro: Intraoperative tissue expansion was performed, utilizing the SUTUREGARD device, in order to reduce wound tension.
O-T Advancement Flap Text: The defect edges were debeveled with a #15 scalpel blade.  Given the location of the defect, shape of the defect and the proximity to free margins an O-T advancement flap was deemed most appropriate.  Using a sterile surgical marker, an appropriate advancement flap was drawn incorporating the defect and placing the expected incisions within the relaxed skin tension lines where possible.    The area thus outlined was incised deep to adipose tissue with a #15 scalpel blade.  The skin margins were undermined to an appropriate distance in all directions utilizing iris scissors.
Trilobed Flap Text: The defect edges were debeveled with a #15 scalpel blade.  Given the location of the defect and the proximity to free margins a trilobed flap was deemed most appropriate.  Using a sterile surgical marker, an appropriate trilobed flap drawn around the defect.    The area thus outlined was incised deep to adipose tissue with a #15 scalpel blade.  The skin margins were undermined to an appropriate distance in all directions utilizing iris scissors.
O-T Plasty Text: The defect edges were debeveled with a #15 scalpel blade.  Given the location of the defect, shape of the defect and the proximity to free margins an O-T plasty was deemed most appropriate.  Using a sterile surgical marker, an appropriate O-T plasty was drawn incorporating the defect and placing the expected incisions within the relaxed skin tension lines where possible.    The area thus outlined was incised deep to adipose tissue with a #15 scalpel blade.  The skin margins were undermined to an appropriate distance in all directions utilizing iris scissors.
Perilesional Excision Additional Text (Leave Blank If You Do Not Want): The margin was drawn around the clinically apparent lesion. Incisions were then made along these lines to the appropriate tissue plane and the lesion was extirpated.
Debridement Text: The wound edges were debrided prior to proceeding with the closure to facilitate wound healing.
Wound Care: Petrolatum
Epidermal Closure Graft Donor Site (Optional): simple interrupted
O-Z Plasty Text: The defect edges were debeveled with a #15 scalpel blade.  Given the location of the defect, shape of the defect and the proximity to free margins an O-Z plasty (double transposition flap) was deemed most appropriate.  Using a sterile surgical marker, the appropriate transposition flaps were drawn incorporating the defect and placing the expected incisions within the relaxed skin tension lines where possible.    The area thus outlined was incised deep to adipose tissue with a #15 scalpel blade.  The skin margins were undermined to an appropriate distance in all directions utilizing iris scissors.  Hemostasis was achieved with electrocautery.  The flaps were then transposed into place, one clockwise and the other counterclockwise, and anchored with interrupted buried subcutaneous sutures.
Muscle Hinge Flap Text: The defect edges were debeveled with a #15 scalpel blade.  Given the size, depth and location of the defect and the proximity to free margins a muscle hinge flap was deemed most appropriate.  Using a sterile surgical marker, an appropriate hinge flap was drawn incorporating the defect. The area thus outlined was incised with a #15 scalpel blade.  The skin margins were undermined to an appropriate distance in all directions utilizing iris scissors.
Dermal Autograft Text: The defect edges were debeveled with a #15 scalpel blade.  Given the location of the defect, shape of the defect and the proximity to free margins a dermal autograft was deemed most appropriate.  Using a sterile surgical marker, the primary defect shape was transferred to the donor site. The area thus outlined was incised deep to adipose tissue with a #15 scalpel blade.  The harvested graft was then trimmed of adipose and epidermal tissue until only dermis was left.  The skin graft was then placed in the primary defect and oriented appropriately.
Path Notes (To The Dermatopathologist): Please check margins.
Skin Substitute Text: The defect edges were debeveled with a #15 scalpel blade.  Given the location of the defect, shape of the defect and the proximity to free margins a skin substitute graft was deemed most appropriate.  The graft material was trimmed to fit the size of the defect. The graft was then placed in the primary defect and oriented appropriately.
Complex Repair And Epidermal Autograft Text: The defect edges were debeveled with a #15 scalpel blade.  The primary defect was closed partially with a complex linear closure.  Given the location of the defect, shape of the defect and the proximity to free margins an epidermal autograft was deemed most appropriate to repair the remaining defect.  The graft was trimmed to fit the size of the remaining defect.  The graft was then placed in the primary defect, oriented appropriately, and sutured into place.
Complex Repair And Z Plasty Text: The defect edges were debeveled with a #15 scalpel blade.  The primary defect was closed partially with a complex linear closure.  Given the location of the remaining defect, shape of the defect and the proximity to free margins a Z plasty was deemed most appropriate for complete closure of the defect.  Using a sterile surgical marker, an appropriate advancement flap was drawn incorporating the defect and placing the expected incisions within the relaxed skin tension lines where possible.    The area thus outlined was incised deep to adipose tissue with a #15 scalpel blade.  The skin margins were undermined to an appropriate distance in all directions utilizing iris scissors.
Vermilion Border Text: The closure involved the vermilion border.
Star Wedge Flap Text: The defect edges were debeveled with a #15 scalpel blade.  Given the location of the defect, shape of the defect and the proximity to free margins a star wedge flap was deemed most appropriate.  Using a sterile surgical marker, an appropriate rotation flap was drawn incorporating the defect and placing the expected incisions within the relaxed skin tension lines where possible. The area thus outlined was incised deep to adipose tissue with a #15 scalpel blade.  The skin margins were undermined to an appropriate distance in all directions utilizing iris scissors.
Ftsg Text: The defect edges were debeveled with a #15 scalpel blade.  Given the location of the defect, shape of the defect and the proximity to free margins a full thickness skin graft was deemed most appropriate.  Using a sterile surgical marker, the primary defect shape was transferred to the donor site. The area thus outlined was incised deep to adipose tissue with a #15 scalpel blade.  The harvested graft was then trimmed of adipose tissue until only dermis and epidermis was left.  The skin margins of the secondary defect were undermined to an appropriate distance in all directions utilizing iris scissors.  The secondary defect was closed with interrupted buried subcutaneous sutures.  The skin edges were then re-apposed with running  sutures.  The skin graft was then placed in the primary defect and oriented appropriately.
Complex Repair And Dorsal Nasal Flap Text: The defect edges were debeveled with a #15 scalpel blade.  The primary defect was closed partially with a complex linear closure.  Given the location of the remaining defect, shape of the defect and the proximity to free margins a dorsal nasal flap was deemed most appropriate for complete closure of the defect.  Using a sterile surgical marker, an appropriate flap was drawn incorporating the defect and placing the expected incisions within the relaxed skin tension lines where possible.    The area thus outlined was incised deep to adipose tissue with a #15 scalpel blade.  The skin margins were undermined to an appropriate distance in all directions utilizing iris scissors.
Crescentic Advancement Flap Text: The defect edges were debeveled with a #15 scalpel blade.  Given the location of the defect and the proximity to free margins a crescentic advancement flap was deemed most appropriate.  Using a sterile surgical marker, the appropriate advancement flap was drawn incorporating the defect and placing the expected incisions within the relaxed skin tension lines where possible.    The area thus outlined was incised deep to adipose tissue with a #15 scalpel blade.  The skin margins were undermined to an appropriate distance in all directions utilizing iris scissors.
Epidermal Autograft Text: The defect edges were debeveled with a #15 scalpel blade.  Given the location of the defect, shape of the defect and the proximity to free margins an epidermal autograft was deemed most appropriate.  Using a sterile surgical marker, the primary defect shape was transferred to the donor site. The epidermal graft was then harvested.  The skin graft was then placed in the primary defect and oriented appropriately.
Complex Repair And Transposition Flap Text: The defect edges were debeveled with a #15 scalpel blade.  The primary defect was closed partially with a complex linear closure.  Given the location of the remaining defect, shape of the defect and the proximity to free margins a transposition flap was deemed most appropriate for complete closure of the defect.  Using a sterile surgical marker, an appropriate advancement flap was drawn incorporating the defect and placing the expected incisions within the relaxed skin tension lines where possible.    The area thus outlined was incised deep to adipose tissue with a #15 scalpel blade.  The skin margins were undermined to an appropriate distance in all directions utilizing iris scissors.
Complex Repair And W Plasty Text: The defect edges were debeveled with a #15 scalpel blade.  The primary defect was closed partially with a complex linear closure.  Given the location of the remaining defect, shape of the defect and the proximity to free margins a W plasty was deemed most appropriate for complete closure of the defect.  Using a sterile surgical marker, an appropriate advancement flap was drawn incorporating the defect and placing the expected incisions within the relaxed skin tension lines where possible.    The area thus outlined was incised deep to adipose tissue with a #15 scalpel blade.  The skin margins were undermined to an appropriate distance in all directions utilizing iris scissors.
Body Location Override (Optional - Billing Will Still Be Based On Selected Body Map Location If Applicable): Right lateral zygoma
Billing Type: Third-Party Bill
Information: Selecting Yes will display possible errors in your note based on the variables you have selected. This validation is only offered as a suggestion for you. PLEASE NOTE THAT THE VALIDATION TEXT WILL BE REMOVED WHEN YOU FINALIZE YOUR NOTE. IF YOU WANT TO FAX A PRELIMINARY NOTE YOU WILL NEED TO TOGGLE THIS TO 'NO' IF YOU DO NOT WANT IT IN YOUR FAXED NOTE.
Home Suture Removal Text: Patient was provided a home suture removal kit and will remove their sutures at home.  If they have any questions or difficulties they will call the office.
Repair Performed By Another Provider Text (Leave Blank If You Do Not Want): After the tissue was excised the defect was repaired by another provider.
Detail Level: Detailed
Complex Repair And Rotation Flap Text: The defect edges were debeveled with a #15 scalpel blade.  The primary defect was closed partially with a complex linear closure.  Given the location of the remaining defect, shape of the defect and the proximity to free margins a rotation flap was deemed most appropriate for complete closure of the defect.  Using a sterile surgical marker, an appropriate advancement flap was drawn incorporating the defect and placing the expected incisions within the relaxed skin tension lines where possible.    The area thus outlined was incised deep to adipose tissue with a #15 scalpel blade.  The skin margins were undermined to an appropriate distance in all directions utilizing iris scissors.
Keystone Flap Text: The defect edges were debeveled with a #15 scalpel blade.  Given the location of the defect, shape of the defect a keystone flap was deemed most appropriate.  Using a sterile surgical marker, an appropriate keystone flap was drawn incorporating the defect, outlining the appropriate donor tissue and placing the expected incisions within the relaxed skin tension lines where possible. The area thus outlined was incised deep to adipose tissue with a #15 scalpel blade.  The skin margins were undermined to an appropriate distance in all directions around the primary defect and laterally outward around the flap utilizing iris scissors.
Retention Suture Text: Retention sutures were placed to support the closure and prevent dehiscence.
Helical Rim Text: The closure involved the helical rim.
Spiral Flap Text: The defect edges were debeveled with a #15 scalpel blade.  Given the location of the defect, shape of the defect and the proximity to free margins a spiral flap was deemed most appropriate.  Using a sterile surgical marker, an appropriate rotation flap was drawn incorporating the defect and placing the expected incisions within the relaxed skin tension lines where possible. The area thus outlined was incised deep to adipose tissue with a #15 scalpel blade.  The skin margins were undermined to an appropriate distance in all directions utilizing iris scissors.
Excisional Biopsy Additional Text (Leave Blank If You Do Not Want): The margin was drawn around the clinically apparent lesion. An elliptical shape was then drawn on the skin incorporating the lesion and margins.  Incisions were then made along these lines to the appropriate tissue plane and the lesion was extirpated.
Ear Star Wedge Flap Text: The defect edges were debeveled with a #15 blade scalpel.  Given the location of the defect and the proximity to free margins (helical rim) an ear star wedge flap was deemed most appropriate.  Using a sterile surgical marker, the appropriate flap was drawn incorporating the defect and placing the expected incisions between the helical rim and antihelix where possible.  The area thus outlined was incised through and through with a #15 scalpel blade.
Excision Method: Perilesional
V-Y Flap Text: The defect edges were debeveled with a #15 scalpel blade.  Given the location of the defect, shape of the defect and the proximity to free margins a V-Y flap was deemed most appropriate.  Using a sterile surgical marker, an appropriate advancement flap was drawn incorporating the defect and placing the expected incisions within the relaxed skin tension lines where possible.    The area thus outlined was incised deep to adipose tissue with a #15 scalpel blade.  The skin margins were undermined to an appropriate distance in all directions utilizing iris scissors.

## 2020-09-24 ENCOUNTER — APPOINTMENT (OUTPATIENT)
Dept: URBAN - METROPOLITAN AREA CLINIC 255 | Age: 35
Setting detail: DERMATOLOGY
End: 2020-09-27

## 2020-09-24 DIAGNOSIS — Z48.02 ENCOUNTER FOR REMOVAL OF SUTURES: ICD-10-CM

## 2020-09-24 PROCEDURE — OTHER SUTURE REMOVAL (GLOBAL PERIOD): OTHER

## 2020-09-24 PROCEDURE — OTHER MIPS QUALITY: OTHER

## 2020-09-24 PROCEDURE — OTHER DIAGNOSIS COMMENT: OTHER

## 2020-09-24 PROCEDURE — OTHER COUNSELING: OTHER

## 2020-09-24 ASSESSMENT — LOCATION SIMPLE DESCRIPTION DERM: LOCATION SIMPLE: RIGHT CHEEK

## 2020-09-24 ASSESSMENT — LOCATION DETAILED DESCRIPTION DERM: LOCATION DETAILED: RIGHT LATERAL MALAR CHEEK

## 2020-09-24 ASSESSMENT — LOCATION ZONE DERM: LOCATION ZONE: FACE

## 2020-09-24 NOTE — PROCEDURE: SUTURE REMOVAL (GLOBAL PERIOD)
Detail Level: Detailed
Add 17688 Cpt? (Important Note: In 2017 The Use Of 69659 Is Being Tracked By Cms To Determine Future Global Period Reimbursement For Global Periods): no
Body Location Override (Optional - Billing Will Still Be Based On Selected Body Map Location If Applicable): Right lateral Zygoma

## 2020-10-28 DIAGNOSIS — E03.9 HYPOTHYROIDISM, UNSPECIFIED TYPE: ICD-10-CM

## 2020-10-28 NOTE — TELEPHONE ENCOUNTER
Next 5 appointments (look out 90 days)    Dec 01, 2020  3:00 PM  MyChart Physical Adult with Staci Evans MD  [facility] ([facility]) [address] [facility] [address]  [phone]        #30 is not quite enough to get patient to NOV, so pended with R1.    Nela Juarez, RT (R)

## 2020-10-29 RX ORDER — LEVOTHYROXINE SODIUM 125 UG/1
TABLET ORAL
Qty: 30 TABLET | Refills: 1 | Status: SHIPPED | OUTPATIENT
Start: 2020-10-29 | End: 2020-12-01

## 2020-12-01 ENCOUNTER — OFFICE VISIT (OUTPATIENT)
Dept: FAMILY MEDICINE | Facility: CLINIC | Age: 35
End: 2020-12-01
Payer: COMMERCIAL

## 2020-12-01 VITALS
HEIGHT: 64 IN | SYSTOLIC BLOOD PRESSURE: 122 MMHG | OXYGEN SATURATION: 100 % | BODY MASS INDEX: 20.83 KG/M2 | DIASTOLIC BLOOD PRESSURE: 79 MMHG | TEMPERATURE: 98.4 F | WEIGHT: 122 LBS | HEART RATE: 93 BPM

## 2020-12-01 DIAGNOSIS — Z01.818 PREOP GENERAL PHYSICAL EXAM: Primary | ICD-10-CM

## 2020-12-01 DIAGNOSIS — E03.9 HYPOTHYROIDISM, UNSPECIFIED TYPE: ICD-10-CM

## 2020-12-01 DIAGNOSIS — Z79.899 MEDICATION MANAGEMENT: ICD-10-CM

## 2020-12-01 DIAGNOSIS — F41.9 ANXIETY: ICD-10-CM

## 2020-12-01 DIAGNOSIS — Z11.59 SCREENING FOR VIRAL DISEASE: ICD-10-CM

## 2020-12-01 DIAGNOSIS — F32.0 MILD MAJOR DEPRESSION (H): ICD-10-CM

## 2020-12-01 DIAGNOSIS — Z12.4 CERVICAL CANCER SCREENING: ICD-10-CM

## 2020-12-01 DIAGNOSIS — L70.0 ACNE VULGARIS: ICD-10-CM

## 2020-12-01 DIAGNOSIS — E78.5 HYPERLIPIDEMIA, UNSPECIFIED HYPERLIPIDEMIA TYPE: ICD-10-CM

## 2020-12-01 DIAGNOSIS — R11.0 NAUSEA: ICD-10-CM

## 2020-12-01 DIAGNOSIS — J34.2 DNS (DEVIATED NASAL SEPTUM): ICD-10-CM

## 2020-12-01 DIAGNOSIS — F90.0 ADHD (ATTENTION DEFICIT HYPERACTIVITY DISORDER), INATTENTIVE TYPE: ICD-10-CM

## 2020-12-01 DIAGNOSIS — Z13.0 SCREENING FOR DEFICIENCY ANEMIA: ICD-10-CM

## 2020-12-01 LAB
BASOPHILS # BLD AUTO: 0 10E9/L (ref 0–0.2)
BASOPHILS NFR BLD AUTO: 0.3 %
DIFFERENTIAL METHOD BLD: NORMAL
EOSINOPHIL # BLD AUTO: 0.1 10E9/L (ref 0–0.7)
EOSINOPHIL NFR BLD AUTO: 0.8 %
ERYTHROCYTE [DISTWIDTH] IN BLOOD BY AUTOMATED COUNT: 11.9 % (ref 10–15)
HCT VFR BLD AUTO: 39.8 % (ref 35–47)
HGB BLD-MCNC: 13.9 G/DL (ref 11.7–15.7)
LYMPHOCYTES # BLD AUTO: 2.5 10E9/L (ref 0.8–5.3)
LYMPHOCYTES NFR BLD AUTO: 32.2 %
MCH RBC QN AUTO: 32.2 PG (ref 26.5–33)
MCHC RBC AUTO-ENTMCNC: 34.9 G/DL (ref 31.5–36.5)
MCV RBC AUTO: 92 FL (ref 78–100)
MONOCYTES # BLD AUTO: 0.6 10E9/L (ref 0–1.3)
MONOCYTES NFR BLD AUTO: 7.2 %
NEUTROPHILS # BLD AUTO: 4.6 10E9/L (ref 1.6–8.3)
NEUTROPHILS NFR BLD AUTO: 59.5 %
PLATELET # BLD AUTO: 296 10E9/L (ref 150–450)
RBC # BLD AUTO: 4.32 10E12/L (ref 3.8–5.2)
WBC # BLD AUTO: 7.7 10E9/L (ref 4–11)

## 2020-12-01 PROCEDURE — 87624 HPV HI-RISK TYP POOLED RSLT: CPT | Performed by: INTERNAL MEDICINE

## 2020-12-01 PROCEDURE — 99215 OFFICE O/P EST HI 40 MIN: CPT | Performed by: INTERNAL MEDICINE

## 2020-12-01 PROCEDURE — G0145 SCR C/V CYTO,THINLAYER,RESCR: HCPCS | Performed by: INTERNAL MEDICINE

## 2020-12-01 PROCEDURE — 80061 LIPID PANEL: CPT | Performed by: INTERNAL MEDICINE

## 2020-12-01 PROCEDURE — 36415 COLL VENOUS BLD VENIPUNCTURE: CPT | Performed by: INTERNAL MEDICINE

## 2020-12-01 PROCEDURE — 80050 GENERAL HEALTH PANEL: CPT | Performed by: INTERNAL MEDICINE

## 2020-12-01 PROCEDURE — 84439 ASSAY OF FREE THYROXINE: CPT | Performed by: INTERNAL MEDICINE

## 2020-12-01 RX ORDER — DEXTROAMPHETAMINE SACCHARATE, AMPHETAMINE ASPARTATE, DEXTROAMPHETAMINE SULFATE AND AMPHETAMINE SULFATE 3.75; 3.75; 3.75; 3.75 MG/1; MG/1; MG/1; MG/1
15 TABLET ORAL 2 TIMES DAILY
Qty: 60 TABLET | Refills: 0 | Status: SHIPPED | OUTPATIENT
Start: 2020-12-29 | End: 2020-12-15

## 2020-12-01 RX ORDER — DEXTROAMPHETAMINE SACCHARATE, AMPHETAMINE ASPARTATE, DEXTROAMPHETAMINE SULFATE AND AMPHETAMINE SULFATE 3.75; 3.75; 3.75; 3.75 MG/1; MG/1; MG/1; MG/1
15 TABLET ORAL 2 TIMES DAILY
Qty: 60 TABLET | Refills: 0 | Status: SHIPPED | OUTPATIENT
Start: 2021-01-22 | End: 2020-12-15

## 2020-12-01 RX ORDER — ONDANSETRON 4 MG/1
4 TABLET, ORALLY DISINTEGRATING ORAL EVERY 6 HOURS PRN
Qty: 30 TABLET | Refills: 1 | Status: SHIPPED | OUTPATIENT
Start: 2020-12-01 | End: 2021-10-20

## 2020-12-01 RX ORDER — LEVOTHYROXINE SODIUM 125 UG/1
TABLET ORAL
Qty: 90 TABLET | Refills: 1 | Status: SHIPPED | OUTPATIENT
Start: 2020-12-01 | End: 2020-12-02

## 2020-12-01 RX ORDER — SPIRONOLACTONE 100 MG/1
100 TABLET, FILM COATED ORAL DAILY
Qty: 90 TABLET | Refills: 1 | Status: SHIPPED | OUTPATIENT
Start: 2020-12-01 | End: 2021-07-02

## 2020-12-01 RX ORDER — HYDROXYZINE HYDROCHLORIDE 10 MG/1
10 TABLET, FILM COATED ORAL EVERY 6 HOURS PRN
Qty: 30 TABLET | Refills: 1 | Status: SHIPPED | OUTPATIENT
Start: 2020-12-01 | End: 2021-08-20

## 2020-12-01 ASSESSMENT — PATIENT HEALTH QUESTIONNAIRE - PHQ9: SUM OF ALL RESPONSES TO PHQ QUESTIONS 1-9: 2

## 2020-12-01 ASSESSMENT — MIFFLIN-ST. JEOR: SCORE: 1223.39

## 2020-12-01 NOTE — PATIENT INSTRUCTIONS
Avoid aspirin 7 days before the surgery. Avoid nonsteroidal anti-inflammatory pain medication like ibuprofen, Motrin, or Aleve 3 days before the surgery.  Tylenol is okay to use for pain.  Avoid any OTC multivitamins or herbal supplement 7 days before surgery  Hold spironolactone on morning of surgery   Resume after surgery      Take hydroxyzine as needed for anxiety  Adderall  can be habit-forming. It should be taken as prescribed. Do not mix it  with alcohol. Be careful with driving.Do not loose the  Prescription.  Do not overuse this medication. It is a controlled substance.      Follow up in 6 months   Seek sooner medical attention if there is any worsening of symptoms or problems.

## 2020-12-01 NOTE — PROGRESS NOTES
[address] [address]  Phone: [phone]  Primary Provider: Staci Heller  Pre-op Performing Provider: STACI HELLER    PREOPERATIVE EVALUATION:  Today's date: 12/1/2020    Babita Hartley is a 35 year old female who presents for a preoperative evaluation.    Surgical Information:  Surgery/Procedure: Rhinoplasty/septoplasty - correct deviated septum  Surgery Location: [facility]  Surgeon: Dr. Oscar Leal  Surgery Date: 12/23/2020  Time of Surgery: 11:00 AM  Where patient plans to recover: At home with family  Fax number for surgical facility: [phone]    Type of Anesthesia Anticipated: General    Subjective     HPI related to upcoming procedure:   Is here for preop  She brought the form which needs to be completed  Patient is requesting to send only the form and not the note  They only need  completion of the form  She was very particular about it  She understands that we send our preop notes  Per patient this is a minor surgery and  they need completion of the form only.  Patient says she has been dealing with anxiety and would like something to calm down  It comes intermittently   due to the pandemic plus her work brings it on  She has been seeing a counselor intermittently  She does not want SSRI group of the medication  She wants something which she can take on as-needed basis      Preop Questions 12/1/2020   1. Have you ever had a heart attack or stroke? No   2. Have you ever had surgery on your heart or blood vessels, such as a stent placement, a coronary artery bypass, or surgery on an artery in your head, neck, heart, or legs? No   3. Do you have chest pain with activity? No   4. Do you have a history of  heart failure? No   5. Do you currently have a cold, bronchitis or symptoms of other infection? No   6. Do you have a cough, shortness of breath, or wheezing? No   7. Do you or anyone in your family have previous history of blood  clots? No   8. Do you or does anyone in your family have a serious bleeding problem such as prolonged bleeding following surgeries or cuts? No   9. Have you ever had problems with anemia or been told to take iron pills? No   10. Have you had any abnormal blood loss such as black, tarry or bloody stools, or abnormal vaginal bleeding? No   11. Have you ever had a blood transfusion? No   12. Are you willing to have a blood transfusion if it is medically needed before, during, or after your surgery? Yes   13. Have you or any of your relatives ever had problems with anesthesia? No   14. Do you have sleep apnea, excessive snoring or daytime drowsiness? No   15. Do you have any artifical heart valves or other implanted medical devices like a pacemaker, defibrillator, or continuous glucose monitor? No   16. Do you have artificial joints? No   17. Are you allergic to latex? No   18. Is there any chance that you may be pregnant? No       Health Care Directive:  Patient does not have a Health Care Directive or Living Will: Discussed advance care planning with patient; information given to patient to review.    Preoperative Review of :   reviewed - controlled substances reflected in medication list.      Status of Chronic Conditions:  See problem list for active medical problems.  Problems all longstanding and stable, except as noted/documented.  See ROS for pertinent symptoms related to these conditions.      Review of Systems  Constitutional, neuro, ENT, endocrine, pulmonary, cardiac, gastrointestinal, genitourinary, musculoskeletal, integument and psychiatric systems are negative, except as otherwise noted.  No history of any anesthesia complications   No family history of any anesthesia complications.  Wants medication for anxiety and she  follows therapist  Patient Active Problem List    Diagnosis Date Noted     ADHD (attention deficit hyperactivity disorder), inattentive type 09/26/2019     Priority: Medium      Patient is followed by ROSANA HELLER for ongoing prescription of stimulants.  All refills should be approved by this provider, or covering partner.    Medication(s): Adderall.   Maximum quantity per month: 60  Clinic visit frequency required: Q 3 months     Controlled substance agreement on file: No  Neuropsych evaluation for ADD completed:      Last [facility] website verification: 10/16/2020 [place]  [email or web address]           ASCUS of cervix with negative high risk HPV 10/02/2017     Priority: Medium     10/2/17 ASCUS, Neg HPV. Plan 3 yr co-test       Hypothyroidism 08/13/2015     Priority: Medium     Mild major depression (H) 11/05/2013     Priority: Medium     Chronic constipation 05/17/2013     Priority: Medium     Insomnia 05/17/2013     Priority: Medium     Anxiety 05/17/2013     Priority: Medium     CARDIOVASCULAR SCREENING; LDL GOAL LESS THAN 160 10/31/2010     Priority: Medium      Past Medical History:   Diagnosis Date     Anxiety      ASCUS of cervix with negative high risk HPV 10/02/2017    10/2/17 ASCUS, Neg HPV     Constipation      Ectopic pregnancy 9/26/11     Thyroid disease     hypothyroidism     Past Surgical History:   Procedure Laterality Date     BREAST SURGERY      breast reduction surgery     ECTOPIC PREGNANCY SURGERY       LAPAROSCOPIC SALPINGOTOMY  9/26/11    right for ectopic     Current Outpatient Medications   Medication Sig Dispense Refill     [START ON 1/22/2021] amphetamine-dextroamphetamine (ADDERALL) 15 MG tablet Take 1 tablet (15 mg) by mouth 2 times daily 60 tablet 0     [START ON 12/29/2020] amphetamine-dextroamphetamine (ADDERALL) 15 MG tablet Take 1 tablet (15 mg) by mouth 2 times daily 60 tablet 0     bimatoprost (LATISSE) 0.03 % external opthalmic solution Apply 1 drop topically At Bedtime 5 mL 11     hydroquinone (FIONA) 4 % external cream Apply to face nightly for 12 weeks. 28 g 4     hydrOXYzine (ATARAX) 10 MG tablet Take 1 tablet (10 mg) by mouth every 6  "hours as needed for anxiety 30 tablet 1     levothyroxine (SYNTHROID/LEVOTHROID) 125 MCG tablet TAKE 1 TABLET(125 MCG) BY MOUTH DAILY 90 tablet 1     ondansetron (ZOFRAN-ODT) 4 MG ODT tab Take 1 tablet (4 mg) by mouth every 6 hours as needed for nausea 30 tablet 1     spironolactone (ALDACTONE) 100 MG tablet Take 1 tablet (100 mg) by mouth daily 90 tablet 1     tretinoin (RETIN-A) 0.1 % external cream Apply to the whole face and neck nightly 20 g 11       Allergies   Allergen Reactions     Weed [Grass] Other (See Comments)     Nasal congestion, rhinitis        Social History     Tobacco Use     Smoking status: Never Smoker     Smokeless tobacco: Never Used   Substance Use Topics     Alcohol use: Yes     Frequency: 2-4 times a month     Drinks per session: 1 or 2     Comment: Once a week       History   Drug Use No         Objective     /79 (BP Location: Right arm, Cuff Size: Adult Regular)   Pulse 93   Temp 98.4  F (36.9  C) (Temporal)   Ht 1.626 m (5' 4\")   Wt 55.3 kg (122 lb)   SpO2 100%   Breastfeeding No   BMI 20.94 kg/m      Physical Exam    GENERAL APPEARANCE: healthy, alert and no distress     EYES: EOMI, PERRL     HENT: ear canals and TM's normal and nose shows DNS and mouth without ulcers or lesions     NECK: no adenopathy, no asymmetry, masses, or scars and thyroid normal to palpation     RESP: lungs clear to auscultation - no rales, rhonchi or wheezes     CV: regular rates and rhythm, normal S1 S2, no S3 or S4 and no murmur, click or rub     ABDOMEN:  soft, nontender, no HSM or masses and bowel sounds normal     : normal cervix, adnexae, and uterus without masses or discharge and rectal exam normal without masses-guaiac negative stool  Pap test performed.     MS: extremities normal- no gross deformities noted, no evidence of inflammation in joints, FROM in all extremities.     SKIN: no suspicious lesions or rashes     NEURO: Normal strength and tone, sensory exam grossly normal, mentation " intact and speech normal     PSYCH: mentation appears normal. and affect normal/bright     LYMPHATICS: No cervical adenopathy    Recent Labs   Lab Test 09/26/19  1617 03/18/19  0848   HGB 14.1  --      --     140   POTASSIUM 4.0 4.5   CR 0.77 0.76        Diagnostics:  Recent Results (from the past 24 hour(s))   CBC with platelets differential    Collection Time: 12/01/20  4:00 PM   Result Value Ref Range    WBC 7.7 4.0 - 11.0 10e9/L    RBC Count 4.32 3.8 - 5.2 10e12/L    Hemoglobin 13.9 11.7 - 15.7 g/dL    Hematocrit 39.8 35.0 - 47.0 %    MCV 92 78 - 100 fl    MCH 32.2 26.5 - 33.0 pg    MCHC 34.9 31.5 - 36.5 g/dL    RDW 11.9 10.0 - 15.0 %    Platelet Count 296 150 - 450 10e9/L    % Neutrophils 59.5 %    % Lymphocytes 32.2 %    % Monocytes 7.2 %    % Eosinophils 0.8 %    % Basophils 0.3 %    Absolute Neutrophil 4.6 1.6 - 8.3 10e9/L    Absolute Lymphocytes 2.5 0.8 - 5.3 10e9/L    Absolute Monocytes 0.6 0.0 - 1.3 10e9/L    Absolute Eosinophils 0.1 0.0 - 0.7 10e9/L    Absolute Basophils 0.0 0.0 - 0.2 10e9/L    Diff Method Automated Method       No EKG required for low risk surgery (cataract, skin procedure, breast biopsy, etc).    Revised Cardiac Risk Index (RCRI):  The patient has the following serious cardiovascular risks for perioperative complications:   - No serious cardiac risks = 0 points     RCRI Interpretation: 0 points: Class I (very low risk - 0.4% complication rate)           Assessment & Plan   The proposed surgical procedure is considered LOW risk.    Babita was seen today for pre-op exam and recheck.    Diagnoses and all orders for this visit:    Preop general physical exam  Has deviated nasal septum and is going to have surgery which is septoplasty  She brought a form which I completed  I have completed the form and left it for the staff to fax it and mailed a copy to the patient      DNS (deviated nasal septum)  She is going to have septoplasty     ADHD (attention deficit hyperactivity  disorder), inattentive type  -     amphetamine-dextroamphetamine (ADDERALL) 15 MG tablet; Take 1 tablet (15 mg) by mouth 2 times daily  -     amphetamine-dextroamphetamine (ADDERALL) 15 MG tablet; Take 1 tablet (15 mg) by mouth 2 times daily  Per patient she got medication filled day before  She requested prescription for next two  months  She is tolerating this well       hypothyroidism, unspecified type  -     TSH with free T4 reflex  -     levothyroxine (SYNTHROID/LEVOTHROID) 125 MCG tablet; TAKE 1 TABLET(125 MCG) BY MOUTH DAILY    Hyperlipidemia, unspecified hyperlipidemia type  -     Lipid panel reflex to direct LDL Fasting    Acne vulgaris  -     spironolactone (ALDACTONE) 100 MG tablet; Take 1 tablet (100 mg) by mouth daily  Hold spironolactone on morning of surgery    Medication management  -     Comprehensive metabolic panel    Screening for deficiency anemia  -     CBC with platelets differential    Cervical cancer screening  -     Pap imaged thin layer screen with HPV - recommended age 30 - 65 years (select HPV order below)  -     HPV High Risk Types DNA Cervical  She is due for Pap smear so it was performed    Nausea  -     ondansetron (ZOFRAN-ODT) 4 MG ODT tab; Take 1 tablet (4 mg) by mouth every 6 hours as needed for nausea  She wants to have this available  She uses on a as needed basis    Screening for viral disease  -     Asymptomatic COVID-19 Virus (Coronavirus) by PCR; Future  I discussed with her that this test is ordered by the surgeon  But she wanted me to place the orders   the form says it needs to be done 5 to 7 days before the surgery   I told her to contact the surgeon and make sure she is okay with that   most of the surgeon requires this test 72 hours before the surgery.  So make sure you confirm before proceeding to this test    Anxiety  -     hydrOXYzine (ATARAX) 10 MG tablet; Take 1 tablet (10 mg) by mouth every 6 hours as needed for anxiety  Management is discussed  Discussed SSRI  group of the medications  At this point she wants something to be used on as-needed basis  This will be considered rescue medication  Benzodiazepine would not be a good choice as those are habit-forming  But Lexapro or Prozac can be added in future if she decides about that    Mild major depression (H)  She feels more anxious than depressed  She sees therapist intermittently    Risks and Recommendations:  The patient has the following additional risks and recommendations for perioperative complications:   - No identified additional risk factors other than previously addressed    Medication Instructions:  Patient is to take all scheduled medications on the day of surgery EXCEPT for modifications listed below:  Avoid aspirin 7 days before the surgery. Avoid nonsteroidal anti-inflammatory pain medication like ibuprofen, Motrin, or Aleve 3 days before the surgery.  Tylenol is okay to use for pain.  Avoid any OTC multivitamins or herbal supplement 7 days before surgery   Resume after surgery  Hold spironolactone on morning of surgery    RECOMMENDATION:  APPROVAL GIVEN to proceed with proposed procedure, without further diagnostic evaluation.    Disclaimer: This note consists of symbols derived from keyboarding, dictation and/or voice recognition software. As a result, there may be errors in the script that have gone undetected. Please consider this when interpreting information found in this chart.      Signed Electronically by: Staci Evans MD    Copy of this evaluation report is provided to requesting physician.    [facility] Preop Guidelines    Revised Cardiac Risk Index

## 2020-12-02 DIAGNOSIS — E03.9 HYPOTHYROIDISM, UNSPECIFIED TYPE: ICD-10-CM

## 2020-12-02 LAB
ALBUMIN SERPL-MCNC: 4.1 G/DL (ref 3.4–5)
ALP SERPL-CCNC: 48 U/L (ref 40–150)
ALT SERPL W P-5'-P-CCNC: 17 U/L (ref 0–50)
ANION GAP SERPL CALCULATED.3IONS-SCNC: 6 MMOL/L (ref 3–14)
AST SERPL W P-5'-P-CCNC: 11 U/L (ref 0–45)
BILIRUB SERPL-MCNC: 0.4 MG/DL (ref 0.2–1.3)
BUN SERPL-MCNC: 13 MG/DL (ref 7–30)
CALCIUM SERPL-MCNC: 8.7 MG/DL (ref 8.5–10.1)
CHLORIDE SERPL-SCNC: 110 MMOL/L (ref 94–109)
CHOLEST SERPL-MCNC: 177 MG/DL
CO2 SERPL-SCNC: 22 MMOL/L (ref 20–32)
CREAT SERPL-MCNC: 0.79 MG/DL (ref 0.52–1.04)
GFR SERPL CREATININE-BSD FRML MDRD: >90 ML/MIN/{1.73_M2}
GLUCOSE SERPL-MCNC: 88 MG/DL (ref 70–99)
HDLC SERPL-MCNC: 43 MG/DL
LDLC SERPL CALC-MCNC: 122 MG/DL
NONHDLC SERPL-MCNC: 134 MG/DL
POTASSIUM SERPL-SCNC: 4.4 MMOL/L (ref 3.4–5.3)
PROT SERPL-MCNC: 7.4 G/DL (ref 6.8–8.8)
SODIUM SERPL-SCNC: 138 MMOL/L (ref 133–144)
T4 FREE SERPL-MCNC: 1.43 NG/DL (ref 0.76–1.46)
TRIGL SERPL-MCNC: 62 MG/DL
TSH SERPL DL<=0.005 MIU/L-ACNC: 0.36 MU/L (ref 0.4–4)

## 2020-12-02 RX ORDER — LEVOTHYROXINE SODIUM 112 UG/1
112 TABLET ORAL DAILY
Qty: 90 TABLET | Refills: 0 | Status: SHIPPED | OUTPATIENT
Start: 2020-12-02 | End: 2021-03-05

## 2020-12-02 NOTE — RESULT ENCOUNTER NOTE
Zahra Jc,    This is to inform you regarding your test result.    CBC result which includes white count Hemoglobin and  Platelet Counts is normal.   Your total cholesterol is elevated.  HDL which is called good cholesterol is low.  Your LDL which is called bad cholesterol is elevated.  Eat low cholesterol low fat  diet and do regular physical activity.  Other test results are pending.      Sincerely,      Dr.Nasima Nathan MD,FACP

## 2020-12-02 NOTE — RESULT ENCOUNTER NOTE
Zahra Jc,    This is to inform you regarding your test result.    I spoke to you on phone but sending you a result note also.  TSH which is thyroid hormone is low  Reduce levothyroxine from 125 mcg to 112 mcg po daily  New script is sent.  Make sure you get 112 mcg   Recheck TSH in 2 months  Make lab only appointment       Sincerely,      Dr.Nasima Nathan MD,FACP

## 2020-12-03 LAB
COPATH REPORT: NORMAL
PAP: NORMAL

## 2020-12-06 ENCOUNTER — HEALTH MAINTENANCE LETTER (OUTPATIENT)
Age: 35
End: 2020-12-06

## 2020-12-08 LAB
FINAL DIAGNOSIS: NORMAL
HPV HR 12 DNA CVX QL NAA+PROBE: NEGATIVE
HPV16 DNA SPEC QL NAA+PROBE: NEGATIVE
HPV18 DNA SPEC QL NAA+PROBE: NEGATIVE
SPECIMEN DESCRIPTION: NORMAL
SPECIMEN SOURCE CVX/VAG CYTO: NORMAL

## 2020-12-15 ENCOUNTER — TELEPHONE (OUTPATIENT)
Dept: FAMILY MEDICINE | Facility: CLINIC | Age: 35
End: 2020-12-15

## 2020-12-15 DIAGNOSIS — F90.0 ADHD (ATTENTION DEFICIT HYPERACTIVITY DISORDER), INATTENTIVE TYPE: ICD-10-CM

## 2020-12-15 RX ORDER — DEXTROAMPHETAMINE SACCHARATE, AMPHETAMINE ASPARTATE, DEXTROAMPHETAMINE SULFATE AND AMPHETAMINE SULFATE 3.75; 3.75; 3.75; 3.75 MG/1; MG/1; MG/1; MG/1
15 TABLET ORAL 2 TIMES DAILY
Qty: 60 TABLET | Refills: 0 | Status: SHIPPED | OUTPATIENT
Start: 2020-12-15 | End: 2021-03-01

## 2020-12-15 RX ORDER — DEXTROAMPHETAMINE SACCHARATE, AMPHETAMINE ASPARTATE, DEXTROAMPHETAMINE SULFATE AND AMPHETAMINE SULFATE 3.75; 3.75; 3.75; 3.75 MG/1; MG/1; MG/1; MG/1
15 TABLET ORAL 2 TIMES DAILY
Qty: 60 TABLET | Refills: 0 | Status: SHIPPED | OUTPATIENT
Start: 2020-12-29 | End: 2020-12-15

## 2020-12-15 NOTE — TELEPHONE ENCOUNTER
"Non-urgent.    Pharmacy asking why Rx for Adderall was dated \"start 12/29/20\" by PCP.  Her last refill was #34 on 11/23.  If taking bid, will be \"out of medication\" prior to 12/29/20. There were TWO Rxs sent. (Dec.and Jan)    I tried to reach patient. Left message confirming if she is taking bid? Historically, and per pharmacy, she has been taking bid.    , do you recall any details of this? Pharmacy will go ahead and fill unless they hear back from RN.  Thank you,  Mirian Douglass RN on 12/15/2020 at 2:33 PM      "

## 2020-12-15 NOTE — TELEPHONE ENCOUNTER
Reason for Call:  Other     Detailed comments: [facility] in [place] called they need direction on amphetamine-dextroamphetamine (ADDERALL) 15 MG tablet, pt need refill but it looks like direction on prescription has been changed.    Phone Number Pharmacy can be reached at: Other phone number:  [phone]    Best Time: anytime    Can we leave a detailed message on this number? YES    Call taken on 12/15/2020 at 1:48 PM by Annelise Thomas

## 2020-12-19 DIAGNOSIS — Z11.59 SCREENING FOR VIRAL DISEASE: ICD-10-CM

## 2020-12-19 PROCEDURE — U0003 INFECTIOUS AGENT DETECTION BY NUCLEIC ACID (DNA OR RNA); SEVERE ACUTE RESPIRATORY SYNDROME CORONAVIRUS 2 (SARS-COV-2) (CORONAVIRUS DISEASE [COVID-19]), AMPLIFIED PROBE TECHNIQUE, MAKING USE OF HIGH THROUGHPUT TECHNOLOGIES AS DESCRIBED BY CMS-2020-01-R: HCPCS | Performed by: INTERNAL MEDICINE

## 2020-12-20 LAB
SARS-COV-2 RNA SPEC QL NAA+PROBE: NOT DETECTED
SPECIMEN SOURCE: NORMAL

## 2020-12-20 NOTE — RESULT ENCOUNTER NOTE
Zahra Jc,    This is to inform you regarding your test result.    Your PCR test for Covid-19 was negative .        Sincerely,      Dr.Nasima Nathan MD,FACP

## 2021-01-22 ENCOUNTER — MYC MEDICAL ADVICE (OUTPATIENT)
Dept: DERMATOLOGY | Facility: CLINIC | Age: 36
End: 2021-01-22

## 2021-01-22 DIAGNOSIS — L57.0 AK (ACTINIC KERATOSIS): Primary | ICD-10-CM

## 2021-01-23 RX ORDER — FLUOROURACIL 50 MG/G
CREAM TOPICAL
Qty: 40 G | Refills: 2 | Status: SHIPPED | OUTPATIENT
Start: 2021-01-23 | End: 2023-08-07

## 2021-03-01 ENCOUNTER — MYC MEDICAL ADVICE (OUTPATIENT)
Dept: DERMATOLOGY | Facility: CLINIC | Age: 36
End: 2021-03-01

## 2021-03-01 DIAGNOSIS — L70.0 ACNE VULGARIS: Primary | ICD-10-CM

## 2021-03-02 RX ORDER — TRETINOIN 0.25 MG/G
CREAM TOPICAL
Qty: 45 G | Refills: 11 | Status: SHIPPED | OUTPATIENT
Start: 2021-03-02

## 2021-03-03 DIAGNOSIS — E03.9 HYPOTHYROIDISM, UNSPECIFIED TYPE: ICD-10-CM

## 2021-03-05 RX ORDER — LEVOTHYROXINE SODIUM 112 UG/1
112 TABLET ORAL DAILY
Qty: 30 TABLET | Refills: 0 | Status: SHIPPED | OUTPATIENT
Start: 2021-03-05 | End: 2021-04-01

## 2021-03-05 NOTE — TELEPHONE ENCOUNTER
Routing refill request to provider for review/approval because:  Labs out of range:  TSH    Please review and authorize if appropriate.    Thank you,   Xavier KIRBY RN

## 2021-03-18 NOTE — TELEPHONE ENCOUNTER
Pt has not reviewed [employer] message     Patient Contact    Attempt # 1    Was call answered?  No.  Left message on voicemail with information to call back/or review message on [employer]    Camille CARTER RN

## 2021-04-01 DIAGNOSIS — E03.9 HYPOTHYROIDISM, UNSPECIFIED TYPE: ICD-10-CM

## 2021-04-01 RX ORDER — LEVOTHYROXINE SODIUM 112 UG/1
112 TABLET ORAL DAILY
Qty: 30 TABLET | Refills: 0 | Status: SHIPPED | OUTPATIENT
Start: 2021-04-01 | End: 2021-04-29

## 2021-04-01 NOTE — TELEPHONE ENCOUNTER
"Routing refill request to provider for review/approval because:  Labs out of range:  TSH        Requested Prescriptions   Pending Prescriptions Disp Refills     levothyroxine (SYNTHROID/LEVOTHROID) 112 MCG tablet 30 tablet 0     Sig: Take 1 tablet (112 mcg) by mouth daily for thyroid please schedule lab appointment for TSH       Thyroid Protocol Failed - 4/1/2021  2:20 PM        Failed - Normal TSH on file in past 12 months     Recent Labs   Lab Test 12/01/20  1600   TSH 0.36*              Passed - Patient is 12 years or older        Passed - Recent (12 mo) or future (30 days) visit within the authorizing provider's specialty     Patient has had an office visit with the authorizing provider or a provider within the authorizing providers department within the previous 12 mos or has a future within next 30 days. See \"Patient Info\" tab in inbasket, or \"Choose Columns\" in Meds & Orders section of the refill encounter.              Passed - Medication is active on med list        Passed - No active pregnancy on record     If patient is pregnant or has had a positive pregnancy test, please check TSH.          Passed - No positive pregnancy test in past 12 months     If patient is pregnant or has had a positive pregnancy test, please check TSH.               "

## 2021-04-01 NOTE — TELEPHONE ENCOUNTER
levothyroxine (SYNTHROID/LEVOTHROID) 112 MCG tablet  Last Written Prescription Date:  3/05/21  Last Fill Quantity: 30 tablet,  # refills: 0   Last office visit: 12/1/2020 (preop) with prescribing provider:  Nathan Banuelos Office Visit:

## 2021-04-29 DIAGNOSIS — E03.9 HYPOTHYROIDISM, UNSPECIFIED TYPE: ICD-10-CM

## 2021-04-29 RX ORDER — LEVOTHYROXINE SODIUM 112 UG/1
112 TABLET ORAL DAILY
Qty: 30 TABLET | Refills: 0 | Status: SHIPPED | OUTPATIENT
Start: 2021-04-29 | End: 2021-06-03

## 2021-06-01 DIAGNOSIS — E03.9 HYPOTHYROIDISM, UNSPECIFIED TYPE: ICD-10-CM

## 2021-06-01 NOTE — TELEPHONE ENCOUNTER
Levothyroxine 112 mcg tablet    Summary: Take 1 tablet (112 mcg) by mouth daily for thyroid please schedule lab appointment for TSH, Disp-30 tablet, R-0, E-Prescribe   Dose, Route, Frequency: 112 mcg, Oral, DAILY  Start: 4/29/2021  Ord/Sold: 4/29/2021

## 2021-06-03 RX ORDER — LEVOTHYROXINE SODIUM 112 UG/1
112 TABLET ORAL DAILY
Qty: 30 TABLET | Refills: 0 | Status: SHIPPED | OUTPATIENT
Start: 2021-06-03 | End: 2021-07-02

## 2021-06-03 NOTE — TELEPHONE ENCOUNTER
Routing refill request to provider for review/approval because:  Labs out of range:          TSH   Date Value Ref Range Status   12/01/2020 0.36 (L) 0.40 - 4.00 mU/L Final       Last office vist: 12/1/2020  Pended 30 day supply.  Next office visit: none    Ana Mark RN

## 2021-07-21 ENCOUNTER — MYC MEDICAL ADVICE (OUTPATIENT)
Dept: FAMILY MEDICINE | Facility: CLINIC | Age: 36
End: 2021-07-21

## 2021-07-21 ENCOUNTER — LAB (OUTPATIENT)
Dept: LAB | Facility: CLINIC | Age: 36
End: 2021-07-21
Payer: COMMERCIAL

## 2021-07-21 DIAGNOSIS — F90.0 ADHD (ATTENTION DEFICIT HYPERACTIVITY DISORDER), INATTENTIVE TYPE: ICD-10-CM

## 2021-07-21 DIAGNOSIS — E03.9 HYPOTHYROIDISM, UNSPECIFIED TYPE: ICD-10-CM

## 2021-07-21 LAB — TSH SERPL DL<=0.005 MIU/L-ACNC: 2.29 MU/L (ref 0.4–4)

## 2021-07-21 PROCEDURE — 36415 COLL VENOUS BLD VENIPUNCTURE: CPT

## 2021-07-21 PROCEDURE — 84443 ASSAY THYROID STIM HORMONE: CPT

## 2021-07-21 RX ORDER — LEVOTHYROXINE SODIUM 112 UG/1
112 TABLET ORAL DAILY
Qty: 90 TABLET | Refills: 1 | Status: SHIPPED | OUTPATIENT
Start: 2021-07-21 | End: 2021-08-04

## 2021-07-21 RX ORDER — DEXTROAMPHETAMINE SACCHARATE, AMPHETAMINE ASPARTATE, DEXTROAMPHETAMINE SULFATE AND AMPHETAMINE SULFATE 3.75; 3.75; 3.75; 3.75 MG/1; MG/1; MG/1; MG/1
15 TABLET ORAL 2 TIMES DAILY
Qty: 60 TABLET | Refills: 0 | Status: SHIPPED | OUTPATIENT
Start: 2021-07-21 | End: 2021-08-20 | Stop reason: ALTCHOICE

## 2021-07-21 NOTE — LETTER
July 26, 2021      Babita Hartley  [address]  [place] [address]              Dear Babita,    Dr Evans has tried to reach you regarding your Adderall. Dr Evans advises this medication requires 6 month follow up visits. She requests you schedule an appointment to follow up with her - ([phone])      Thank you   [facility] Team

## 2021-07-21 NOTE — RESULT ENCOUNTER NOTE
Zahra Jc,    This is to inform you regarding your test result.  TSH which is thyroid hormone is normal.  Stay on current dose of levothyroxine.  Recheck TSH in 6 months      Sincerely,      Dr.Nasima Nathan MD,FACP

## 2021-07-21 NOTE — TELEPHONE ENCOUNTER
"Dr Evans---it doesn't look like patient saw your  message sent 7/14/21 regarding her Adderall request.     She is saying she will be out of the med by this Friday.     You have one appointment available Friday morning but the system won't allow us to schedule since it's on a \"reserve list\".    Would a Telephone Visit be OK if available?    Thank you,  Jennifer MARTINI, RN      July 21, 2021  1:24 PM      "

## 2021-07-21 NOTE — TELEPHONE ENCOUNTER
Please leave VM for her to read my chart message   I have renewed her script one more time   You can use my Friday slot for in person or virtual visit   Dr.Nasima Nathan MD

## 2021-07-21 NOTE — TELEPHONE ENCOUNTER
Left a detailed voice message asking pt to review her my chart message and schedule appt Friday 07/23. Once pt has scheduled, encounter can be closed.

## 2021-07-22 NOTE — TELEPHONE ENCOUNTER
Left voice message asking pt to call triage back. On call back, please review message below with pt and help her schedule an appt 07/23 with PCP.

## 2021-07-23 NOTE — TELEPHONE ENCOUNTER
Pt has not called back or viewed [employer] messages after multiple attempts please advise, should we mail a letter?

## 2021-07-26 NOTE — TELEPHONE ENCOUNTER
I tried to contact her but got the VM.  Left a message for her to read her [employer] messages  Okay to send her a letter  Dr.Nasima Nathan MD

## 2021-08-04 DIAGNOSIS — E03.9 HYPOTHYROIDISM, UNSPECIFIED TYPE: ICD-10-CM

## 2021-08-04 RX ORDER — LEVOTHYROXINE SODIUM 112 UG/1
112 TABLET ORAL DAILY
Qty: 90 TABLET | Refills: 1 | Status: SHIPPED | OUTPATIENT
Start: 2021-08-04 | End: 2022-07-18

## 2021-08-04 NOTE — TELEPHONE ENCOUNTER
We sent the script of 112 mcg on 7/21  I am not sure why they sent us 125 mcg script to renew and someone pended that for me   She needs to be on 112  Dr.Nasima Nathan MD

## 2021-08-04 NOTE — TELEPHONE ENCOUNTER
PCP, do you want 112 mcg or 125 mcg for Levothroid?  Please clarify for Zak.     Melani Benz RN  [facility]

## 2021-08-05 NOTE — TELEPHONE ENCOUNTER
Medication is 112 mcg per provider. [facility] notified.     Melani Benz RN  -[facility]

## 2021-08-20 ENCOUNTER — VIRTUAL VISIT (OUTPATIENT)
Dept: FAMILY MEDICINE | Facility: CLINIC | Age: 36
End: 2021-08-20
Payer: COMMERCIAL

## 2021-08-20 DIAGNOSIS — E03.9 HYPOTHYROIDISM, UNSPECIFIED TYPE: ICD-10-CM

## 2021-08-20 DIAGNOSIS — F90.0 ADHD (ATTENTION DEFICIT HYPERACTIVITY DISORDER), INATTENTIVE TYPE: Primary | ICD-10-CM

## 2021-08-20 DIAGNOSIS — F41.9 ANXIETY: ICD-10-CM

## 2021-08-20 DIAGNOSIS — J06.9 UPPER RESPIRATORY TRACT INFECTION, UNSPECIFIED TYPE: ICD-10-CM

## 2021-08-20 PROCEDURE — 99214 OFFICE O/P EST MOD 30 MIN: CPT | Mod: 95 | Performed by: INTERNAL MEDICINE

## 2021-08-20 RX ORDER — DEXTROAMPHETAMINE SACCHARATE, AMPHETAMINE ASPARTATE MONOHYDRATE, DEXTROAMPHETAMINE SULFATE AND AMPHETAMINE SULFATE 3.75; 3.75; 3.75; 3.75 MG/1; MG/1; MG/1; MG/1
15 CAPSULE, EXTENDED RELEASE ORAL 2 TIMES DAILY
Qty: 60 CAPSULE | Refills: 0 | Status: SHIPPED | OUTPATIENT
Start: 2021-09-19 | End: 2021-08-24 | Stop reason: DRUGHIGH

## 2021-08-20 RX ORDER — DEXTROAMPHETAMINE SACCHARATE, AMPHETAMINE ASPARTATE MONOHYDRATE, DEXTROAMPHETAMINE SULFATE AND AMPHETAMINE SULFATE 3.75; 3.75; 3.75; 3.75 MG/1; MG/1; MG/1; MG/1
15 CAPSULE, EXTENDED RELEASE ORAL 2 TIMES DAILY
Qty: 60 CAPSULE | Refills: 0 | Status: SHIPPED | OUTPATIENT
Start: 2021-08-20 | End: 2021-08-24 | Stop reason: DRUGHIGH

## 2021-08-20 ASSESSMENT — PATIENT HEALTH QUESTIONNAIRE - PHQ9: SUM OF ALL RESPONSES TO PHQ QUESTIONS 1-9: 0

## 2021-08-20 NOTE — PROGRESS NOTES
Babita is a 36 year old who is being evaluated via a billable video visit.      How would you like to obtain your AVS? MyChart  If the video visit is dropped, the invitation should be resent by: Text to cell phone: [phone]  Will anyone else be joining your video visit? No      Video Start Time: 1:39 PM    Assessment & Plan     Babita was seen today for recheck medication.    Diagnoses and all orders for this visit:    ADHD (attention deficit hyperactivity disorder), inattentive type  -     amphetamine-dextroamphetamine (ADDERALL XR) 15 MG 24 hr capsule; Take 1 capsule (15 mg) by mouth 2 times daily  -     amphetamine-dextroamphetamine (ADDERALL XR) 15 MG 24 hr capsule; Take 1 capsule (15 mg) by mouth 2 times daily  I discussed with the patient about switching to extended release Adderall  She is on short acting Adderall  Studies have shown that extended release works better for adult ADHD  She agreed to try  If that does not work we will switch her back to short acting Adderall  She will check with her insurance company if they would allow her to have 3-month supply at a time  This is very inconvenient for her to send us request every month  I also told her that she can request 2 prescriptions at the time    Hypothyroidism, unspecified type  Lab Results   Component Value Date    TSH 2.29 07/21/2021    TSH 0.36 12/01/2020     Continue current dose of levothyroxine    Anxiety  Patient has anxiety  She was requesting lorazepam  I discussed with her about my concerns for lorazepam  She tried hydroxyzine which did not work  I discussed with her about SSRI group of the medication  I discussed about adding Lexapro or Zoloft  At this point she decided not to add any more medication  I also offered her to go to see a psychiatrist  I will place a referral  She declined to do that  If she changes her mind she will let me know to start Lexapro    Other orders  -     REVIEW OF HEALTH MAINTENANCE PROTOCOL ORDERS  Her work is  a stressful  She is very busy at work  Does not find time to respond to messages    URI:    She has upper respiratory tract infection  Most likely it is viral  Symptomatic treatment is 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 at this point as symptoms are improving  She has been sick for the last few days  She got the Covid test done which was negative  She will be due for follow-up and lab work in December 956}     See Patient Instructions  Patient Instructions   I have switched you to Adderall XR 15 mg twice a day instead of short acting Adderall  I have sent prescription for this month and next month  If you wanted me to add SSRI group of the medication like Lexapro for anxiety then let me know  Let me know how long-acting medication Adderall XR works for you  Follow-up in December for your physical  Seek sooner medical attention if there is any worsening of symptoms or problems.        Return in about 4 months (around 12/20/2021) for Physical Exam.    Staci Evans MD  [facility] [place] Jc is a 36 year old who presents for the following health issues     HPI     Medication Followup of all meds    Taking Medication as prescribed: yes    Side Effects:  None    Medication Helping Symptoms:  yes     Patient reports that she had upper respiratory tract infection  Today she is feeling better  She does not feel that she has any need of antibiotic  She missed work due to that  She got tested for Covid and it was negative per her report  Her anxiety did not respond to hydroxyzine  I discussed about adding Lexapro  But she declined at this point  She agreed to try long-acting medication  Per patient she was so busy at work that she did not get that time to look at the messages      Review of Systems   Constitutional, HEENT, cardiovascular, pulmonary, gi and gu systems are negative, except as otherwise noted.      Objective    Vitals - Patient  Reported  Weight (Patient Reported): 54 kg (119 lb)      Vitals:  No vitals were obtained today due to virtual visit.    Physical Exam   GENERAL: Healthy, alert and no distress  EYES: Eyes grossly normal to inspection.  No discharge or erythema, or obvious scleral/conjunctival abnormalities.  RESP: No audible wheeze, cough, or visible cyanosis.  No visible retractions or increased work of breathing.    SKIN: Visible skin clear. No significant rash, abnormal pigmentation or lesions.  NEURO: Cranial nerves grossly intact.  Mentation and speech appropriate for age.  PSYCH: Mentation appears normal, affect normal/bright, judgement and insight intact, normal speech and appearance well-groomed.        Disclaimer: This note consists of symbols derived from keyboarding, dictation and/or voice recognition software. As a result, there may be errors in the script that have gone undetected. Please consider this when interpreting information found in this chart.          Video-Visit Details    Type of service:  Video Visit    Video End Time:1:56 PM    Originating Location (pt. Location): Home    Distant Location (provider location):  [facility]     Platform used for Video Visit: [employer]

## 2021-08-20 NOTE — PATIENT INSTRUCTIONS
I have switched you to Adderall XR 15 mg twice a day instead of short acting Adderall  I have sent prescription for this month and next month  If you wanted me to add SSRI group of the medication like Lexapro for anxiety then let me know  Let me know how long-acting medication Adderall XR works for you  Follow-up in December for your physical  Seek sooner medical attention if there is any worsening of symptoms or problems.

## 2021-08-23 ENCOUNTER — MYC MEDICAL ADVICE (OUTPATIENT)
Dept: FAMILY MEDICINE | Facility: CLINIC | Age: 36
End: 2021-08-23

## 2021-08-23 ENCOUNTER — TELEPHONE (OUTPATIENT)
Dept: FAMILY MEDICINE | Facility: CLINIC | Age: 36
End: 2021-08-23

## 2021-08-23 DIAGNOSIS — K21.9 GASTROESOPHAGEAL REFLUX DISEASE WITHOUT ESOPHAGITIS: Primary | ICD-10-CM

## 2021-08-23 DIAGNOSIS — F90.0 ADHD (ATTENTION DEFICIT HYPERACTIVITY DISORDER), INATTENTIVE TYPE: ICD-10-CM

## 2021-08-23 NOTE — TELEPHONE ENCOUNTER
Prior Authorization Retail Medication Request    Medication/Dose: Amphetamine-dextroamphetamine 15 mg  ICD code (if different than what is on RX):  F90.0  Previously Tried and Failed:  None  Rationale:  Patient switching to extended release as studies show more efficacy vs short acting in adults    Insurance Name:  Walk-in Appointment Scheduler  Insurance ID:  [id number]      Pharmacy Information (if different than what is on RX)  Name:  [id number]  Phone:  [phone]

## 2021-08-23 NOTE — TELEPHONE ENCOUNTER
PA Initiation    Medication: amphetamine-dextroamphetamine (ADDERALL XR) 15 MG 24 hr capsule   Insurance Company: [employer] - Phone [phone] Fax [phone]  Pharmacy Filling the Rx: [id number] - [place], [place] - [address] [facility]  Filling Pharmacy Phone: [phone]  Filling Pharmacy Fax: [phone]  Start Date: 8/23/2021

## 2021-08-24 RX ORDER — DEXTROAMPHETAMINE SACCHARATE, AMPHETAMINE ASPARTATE MONOHYDRATE, DEXTROAMPHETAMINE SULFATE AND AMPHETAMINE SULFATE 7.5; 7.5; 7.5; 7.5 MG/1; MG/1; MG/1; MG/1
30 CAPSULE, EXTENDED RELEASE ORAL DAILY
Qty: 30 CAPSULE | Refills: 0 | Status: SHIPPED | OUTPATIENT
Start: 2021-08-24 | End: 2021-09-27 | Stop reason: DRUGHIGH

## 2021-08-24 RX ORDER — DEXTROAMPHETAMINE SACCHARATE, AMPHETAMINE ASPARTATE MONOHYDRATE, DEXTROAMPHETAMINE SULFATE AND AMPHETAMINE SULFATE 5; 5; 5; 5 MG/1; MG/1; MG/1; MG/1
20 CAPSULE, EXTENDED RELEASE ORAL 2 TIMES DAILY
Status: CANCELLED | OUTPATIENT
Start: 2021-08-24

## 2021-08-24 NOTE — TELEPHONE ENCOUNTER
PRIOR AUTHORIZATION DENIED    Medication: amphetamine-dextroamphetamine (ADDERALL XR) 15 MG 24 hr capsule--DENIED    Denial Date: 8/23/2021    Denial Rational: Plan doesn't cover twice daily dosing for the ER formulation.    Appeal Information:

## 2021-08-24 NOTE — TELEPHONE ENCOUNTER
Please check with the pharmacy to make sure her Adalat XR 30 mg goes through her insurance  Switched her from 15 mg twice a day to 30 mg once a day    I spoke to the patient   she agreed to take Adderall XR 30 mg daily  If that does not work or she does not like it then she would let me know  She also requested prescription of Prilosec antimilligram for her acid reflux    Dr.Nasima Nathan MD

## 2021-09-09 DIAGNOSIS — L65.9 HAIR THINNING: ICD-10-CM

## 2021-09-09 RX ORDER — BIMATOPROST 3 UG/ML
SOLUTION TOPICAL
Qty: 5 ML | Refills: 7 | Status: SHIPPED | OUTPATIENT
Start: 2021-09-09

## 2021-09-25 ENCOUNTER — HEALTH MAINTENANCE LETTER (OUTPATIENT)
Age: 36
End: 2021-09-25

## 2021-09-27 DIAGNOSIS — F90.0 ADHD (ATTENTION DEFICIT HYPERACTIVITY DISORDER), INATTENTIVE TYPE: Primary | ICD-10-CM

## 2021-09-27 DIAGNOSIS — F90.0 ADHD (ATTENTION DEFICIT HYPERACTIVITY DISORDER), INATTENTIVE TYPE: ICD-10-CM

## 2021-09-27 RX ORDER — DEXTROAMPHETAMINE SACCHARATE, AMPHETAMINE ASPARTATE MONOHYDRATE, DEXTROAMPHETAMINE SULFATE AND AMPHETAMINE SULFATE 3.75; 3.75; 3.75; 3.75 MG/1; MG/1; MG/1; MG/1
15 CAPSULE, EXTENDED RELEASE ORAL 2 TIMES DAILY
Qty: 60 CAPSULE | Refills: 0 | Status: SHIPPED | OUTPATIENT
Start: 2021-09-27 | End: 2021-09-27

## 2021-09-27 RX ORDER — DEXTROAMPHETAMINE SACCHARATE, AMPHETAMINE ASPARTATE, DEXTROAMPHETAMINE SULFATE AND AMPHETAMINE SULFATE 2.5; 2.5; 2.5; 2.5 MG/1; MG/1; MG/1; MG/1
10 TABLET ORAL DAILY
Qty: 30 TABLET | Refills: 0 | Status: SHIPPED | OUTPATIENT
Start: 2021-09-27 | End: 2021-10-29 | Stop reason: ALTCHOICE

## 2021-09-27 RX ORDER — DEXTROAMPHETAMINE SACCHARATE, AMPHETAMINE ASPARTATE MONOHYDRATE, DEXTROAMPHETAMINE SULFATE AND AMPHETAMINE SULFATE 5; 5; 5; 5 MG/1; MG/1; MG/1; MG/1
20 CAPSULE, EXTENDED RELEASE ORAL EVERY MORNING
Qty: 30 CAPSULE | Refills: 0 | Status: SHIPPED | OUTPATIENT
Start: 2021-09-27 | End: 2021-10-29 | Stop reason: ALTCHOICE

## 2021-09-27 NOTE — PROGRESS NOTES
Patient communicated via team message  She wants twice a day Adderall  Adderall 30 mg daily wears off around 2  We discussed about doing Adderall XR 15 mg twice a day instead  She agreed  Prescription is sent  I told her to send me my chart message about how that works for her  Dr.Nasima Nathan MD

## 2021-09-27 NOTE — PROGRESS NOTES
Insurance would not cover 15 mg of Adderall twice a day  So I switch her to 20 mg of Adderall XR  10 mg of short acting Adderall  I spoke to the pharmacist and canceled other prescriptions  Dr.Nasima Nathan MD

## 2021-10-13 DIAGNOSIS — L70.0 ACNE VULGARIS: ICD-10-CM

## 2021-10-13 RX ORDER — SPIRONOLACTONE 100 MG/1
TABLET, FILM COATED ORAL
Qty: 90 TABLET | Refills: 2 | Status: SHIPPED | OUTPATIENT
Start: 2021-10-13 | End: 2022-05-18

## 2021-10-29 DIAGNOSIS — F90.0 ADHD (ATTENTION DEFICIT HYPERACTIVITY DISORDER), INATTENTIVE TYPE: Primary | ICD-10-CM

## 2021-10-29 RX ORDER — DEXTROAMPHETAMINE SACCHARATE, AMPHETAMINE ASPARTATE, DEXTROAMPHETAMINE SULFATE AND AMPHETAMINE SULFATE 3.75; 3.75; 3.75; 3.75 MG/1; MG/1; MG/1; MG/1
15 TABLET ORAL 2 TIMES DAILY
Qty: 60 TABLET | Refills: 0 | Status: SHIPPED | OUTPATIENT
Start: 2021-10-29 | End: 2021-11-29

## 2021-10-29 NOTE — TELEPHONE ENCOUNTER
Reason for Call:  Medication or medication refill:    Do you use a [facility]?  Name of the pharmacy and phone number for the current request:       [id number] - [place], [place] - [address] [facility]      Name of the medication requested:   Adderall 15 mg  BID      Other request: patient is completely out of medication    Can we leave a detailed message on this number? YES    Phone number patient can be reached at: Cell number on file:    Telephone Information:   Mobile [phone]       Best Time: asap    Call taken on 10/29/2021 at 3:59 PM by Gloria Faustin

## 2021-10-29 NOTE — TELEPHONE ENCOUNTER
Patient would like to go back on short acting Adderall  She was at the  and ask for that  This is approved  We tried long-acting but that did not work  Dr.Nasima Nathan MD

## 2021-12-31 ENCOUNTER — MYC REFILL (OUTPATIENT)
Dept: FAMILY MEDICINE | Facility: CLINIC | Age: 36
End: 2021-12-31
Payer: COMMERCIAL

## 2021-12-31 ENCOUNTER — MYC MEDICAL ADVICE (OUTPATIENT)
Dept: FAMILY MEDICINE | Facility: CLINIC | Age: 36
End: 2021-12-31
Payer: COMMERCIAL

## 2021-12-31 DIAGNOSIS — F90.0 ADHD (ATTENTION DEFICIT HYPERACTIVITY DISORDER), INATTENTIVE TYPE: ICD-10-CM

## 2022-01-03 ENCOUNTER — MYC MEDICAL ADVICE (OUTPATIENT)
Dept: FAMILY MEDICINE | Facility: CLINIC | Age: 37
End: 2022-01-03
Payer: COMMERCIAL

## 2022-01-03 ENCOUNTER — TELEPHONE (OUTPATIENT)
Dept: MIDWIFE SERVICES | Facility: CLINIC | Age: 37
End: 2022-01-03
Payer: COMMERCIAL

## 2022-01-03 DIAGNOSIS — Z30.9 ENCOUNTER FOR CONTRACEPTIVE MANAGEMENT, UNSPECIFIED TYPE: Primary | ICD-10-CM

## 2022-01-03 RX ORDER — DEXTROAMPHETAMINE SACCHARATE, AMPHETAMINE ASPARTATE, DEXTROAMPHETAMINE SULFATE AND AMPHETAMINE SULFATE 3.75; 3.75; 3.75; 3.75 MG/1; MG/1; MG/1; MG/1
15 TABLET ORAL 2 TIMES DAILY
Qty: 60 TABLET | Refills: 0 | Status: SHIPPED | OUTPATIENT
Start: 2022-01-03 | End: 2022-02-07

## 2022-01-03 RX ORDER — LEVONORGESTREL 1.5 MG/1
1.5 TABLET ORAL ONCE
Qty: 1 TABLET | Refills: 0 | Status: SHIPPED | OUTPATIENT
Start: 2022-01-03 | End: 2022-06-07

## 2022-01-03 NOTE — TELEPHONE ENCOUNTER
It will depend on the midwife she sees but we could probably do a same day consult/insert. I think she can come in without cytotec.     KRIS Steen, CNM

## 2022-01-03 NOTE — TELEPHONE ENCOUNTER
Pt on schedule for IUD tomorrow.    Has not had IUD before and has no children.    She is really hoping to get it placed same day as consult.    Do you want to send cytotec?  She took plan B this am (uses condoms but is paranoid about pregnancy).      Kandace CUENCA RN BSN

## 2022-01-03 NOTE — TELEPHONE ENCOUNTER
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

## 2022-01-04 ENCOUNTER — OFFICE VISIT (OUTPATIENT)
Dept: OBGYN | Facility: CLINIC | Age: 37
End: 2022-01-04
Payer: COMMERCIAL

## 2022-01-04 VITALS — WEIGHT: 122.9 LBS | BODY MASS INDEX: 21.1 KG/M2 | DIASTOLIC BLOOD PRESSURE: 70 MMHG | SYSTOLIC BLOOD PRESSURE: 112 MMHG

## 2022-01-04 DIAGNOSIS — N89.8 VAGINAL ITCHING: ICD-10-CM

## 2022-01-04 DIAGNOSIS — Z30.017 NEXPLANON INSERTION: Primary | ICD-10-CM

## 2022-01-04 LAB — HCG IFA URINE: NEGATIVE

## 2022-01-04 PROCEDURE — 11981 INSERTION DRUG DLVR IMPLANT: CPT | Performed by: ADVANCED PRACTICE MIDWIFE

## 2022-01-04 PROCEDURE — 84703 CHORIONIC GONADOTROPIN ASSAY: CPT | Performed by: ADVANCED PRACTICE MIDWIFE

## 2022-01-04 RX ORDER — FLUCONAZOLE 150 MG/1
150 TABLET ORAL ONCE
Qty: 1 TABLET | Refills: 0 | Status: SHIPPED | OUTPATIENT
Start: 2022-01-04 | End: 2022-01-04

## 2022-01-04 NOTE — PROGRESS NOTES
.  NEXPLANON INSERTION PROCEDURE    Nexplanon Lot #: [id number]      Babita Hartley is a 36 year old  who presents for Nexplanon insertion. The  patient's last menstrual period was .  The patient is currently using condoms and plan b  for contraception. She has a history of pregnancy on the pill and an ectopic on the ring. She is very concerned about unwanted pregnancy.     Tests:  Results for orders placed or performed in visit on 22 (from the past 24 hour(s))   HCG IFA Urine POCT   Result Value Ref Range    HCG IFA Urine Negative neg       A complete discussion of the risks and benefits of nexplanon use and the details of the insertion procedure was held with the patient.  All questions were answered.  A consent form was signed.      Prior to the beginning of the procedure the team paused to verify the patient's identity, as well as the procedure to be performed and the correct side/site.  All equipment required was ready and available. The patient was positioned appropriately.     Preprocedure medications: 1% plain lidocaine, 1-2 ml    Patients allergies were confirmed.  The patient was placed in the supine position with her left (non-dominant) arm flexed at the elbow, externally rotated, and placed with her wrist parallel to her ear.  The insertion site was identified 6-8 cm above the elbow crease at the inner aspect overlying the bicepital groove.  The insertion site was marked with a sterile marker. The direction of insertion was also indicated with a denis 6-8 cm proximal in the bicepital groove.  The insertion area was cleaned with betadine swabs and anesthetized with 2 cc of 1% lidocaine with epinephrine.  The Nexplanon was removed from its blister.  With the shield on, the april was visible inside the needle tip.  The needle shield was removed.  Counter-traction was applied to the skin at the marked needle insertion site.  The tip of the needle was inserted at the site at a slight angle.   The applicator was then lowered to a horizontal position.  The needle was inserted to its full length, keeping the needle parallel to the surface of the skin and the skin tented. The applicator button was pressed and the the needle retracted within the device leaving the Nexplanon april under the skin.  The 4 cm april was palpated under the skin.  The patient also palpated the april.  A pressure bandage was applied with sterile gauze. The patient was instructed to remove the bandage in several hours and replace with a band-aid.    The user card was filled out and given to the patient to keep.  The Patient Chart Label was completed and sent for scanning.    PLAN:   The patient was asked to contact the clinic for any fever/chills/severe pelvic or abdominal pain or heavy bleeding.     FOLLOW-UP:  She was asked to follow up for any problems.   Patient advised to use back-up birth control for 7 days.  Recommend she see MD for tubal discussion.   If BTB can use estrogen. Just let us know.  Sera Salvador CNM

## 2022-01-04 NOTE — NURSING NOTE
"Chief Complaint   Patient presents with     Contraception     IUD consult and would like insertion today. Sexually active using condoms. Took Plan B on 1/3/22. LMP: Approximately 2021. Declined STD testing       Initial /70 (BP Location: Right arm, Cuff Size: Adult Regular)   Wt 55.7 kg (122 lb 14.4 oz)   LMP 2021 (Approximate)   Breastfeeding No   BMI 21.10 kg/m   Estimated body mass index is 21.1 kg/m  as calculated from the following:    Height as of 20: 1.626 m (5' 4\").    Weight as of this encounter: 55.7 kg (122 lb 14.4 oz).  BP completed using cuff size: regular    Questioned patient about current smoking habits.  Pt. has never smoked.          The following HM Due: NONE    UPT was negative today    "

## 2022-01-10 ENCOUNTER — MYC MEDICAL ADVICE (OUTPATIENT)
Dept: OBGYN | Facility: CLINIC | Age: 37
End: 2022-01-10
Payer: COMMERCIAL

## 2022-01-10 DIAGNOSIS — Z78.9 USES BIRTH CONTROL: Primary | ICD-10-CM

## 2022-01-10 DIAGNOSIS — N30.00 ACUTE CYSTITIS WITHOUT HEMATURIA: ICD-10-CM

## 2022-01-10 RX ORDER — LEVONORGESTREL 1.5 MG/1
1.5 TABLET ORAL ONCE
Qty: 1 TABLET | Refills: 0 | Status: SHIPPED | OUTPATIENT
Start: 2022-01-10 | End: 2022-06-07

## 2022-01-15 ENCOUNTER — HEALTH MAINTENANCE LETTER (OUTPATIENT)
Age: 37
End: 2022-01-15

## 2022-01-31 RX ORDER — SULFAMETHOXAZOLE/TRIMETHOPRIM 800-160 MG
1 TABLET ORAL 2 TIMES DAILY
Qty: 6 TABLET | Refills: 0 | Status: SHIPPED | OUTPATIENT
Start: 2022-01-31 | End: 2022-02-03

## 2022-01-31 NOTE — TELEPHONE ENCOUNTER
Rx sent to preferred pharmacy.   I usually recommend coming in to leave a UA if possible. If her symptoms don't start to resolve over the next 48 hrs she should come to the clinic to get evaluated.     Thanks,  KRIS Steen, CNM

## 2022-05-18 ENCOUNTER — APPOINTMENT (OUTPATIENT)
Dept: URBAN - METROPOLITAN AREA CLINIC 259 | Age: 37
Setting detail: DERMATOLOGY
End: 2022-05-19

## 2022-05-18 DIAGNOSIS — Z41.9 ENCOUNTER FOR PROCEDURE FOR PURPOSES OTHER THAN REMEDYING HEALTH STATE, UNSPECIFIED: ICD-10-CM

## 2022-05-18 DIAGNOSIS — K21.9 GASTROESOPHAGEAL REFLUX DISEASE WITHOUT ESOPHAGITIS: ICD-10-CM

## 2022-05-18 DIAGNOSIS — L70.0 ACNE VULGARIS: ICD-10-CM

## 2022-05-18 DIAGNOSIS — F90.0 ADHD (ATTENTION DEFICIT HYPERACTIVITY DISORDER), INATTENTIVE TYPE: ICD-10-CM

## 2022-05-18 PROCEDURE — OTHER DYSPORT: OTHER

## 2022-05-18 RX ORDER — DEXTROAMPHETAMINE SACCHARATE, AMPHETAMINE ASPARTATE, DEXTROAMPHETAMINE SULFATE AND AMPHETAMINE SULFATE 3.75; 3.75; 3.75; 3.75 MG/1; MG/1; MG/1; MG/1
15 TABLET ORAL 2 TIMES DAILY
Qty: 60 TABLET | Refills: 0 | Status: SHIPPED | OUTPATIENT
Start: 2022-05-18 | End: 2022-06-07

## 2022-05-18 RX ORDER — SPIRONOLACTONE 100 MG/1
100 TABLET, FILM COATED ORAL DAILY
Qty: 90 TABLET | Refills: 2 | Status: SHIPPED | OUTPATIENT
Start: 2022-05-18 | End: 2023-02-20

## 2022-05-18 NOTE — TELEPHONE ENCOUNTER
Prescription approved per [facility] Refill Protocol.  Chanelle given to get pt to upcoming appointment with PCP.   Yeimy FRANCO RN  [facility]

## 2022-05-18 NOTE — TELEPHONE ENCOUNTER
Pt will be out tomorrow, please address as soon as possible.     Spironolactone labs out of date.     Amphetamine-Dextroamphetamine 15 mg tablet       Last Written Prescription Date:  4/19/22  Last Fill Quantity: 60,   # refills: 0  Last Office Visit: 8/20/21  Future Office visit:    Next 5 appointments (look out 90 days)    Jun 07, 2022  3:00 PM  (Arrive by 2:40 PM)  Provider Visit with Staci Evans MD  [facility] ([facility] ) [address], [address]  [place] [address]  [phone]           Routing refill request to provider for review/approval because:  Drug not on the [facility], [facility] or [facility] refill protocol or controlled substance    Yeimy FRANCO RN  [facility]

## 2022-06-01 ENCOUNTER — RX ONLY (RX ONLY)
Age: 37
End: 2022-06-01

## 2022-06-01 RX ORDER — MINOXIDIL 2.5 MG/1
TABLET ORAL
Qty: 30 | Refills: 3 | Status: ERX | COMMUNITY
Start: 2022-06-01

## 2022-06-07 ASSESSMENT — PATIENT HEALTH QUESTIONNAIRE - PHQ9: SUM OF ALL RESPONSES TO PHQ QUESTIONS 1-9: 0

## 2022-06-18 ENCOUNTER — MYC MEDICAL ADVICE (OUTPATIENT)
Dept: FAMILY MEDICINE | Facility: CLINIC | Age: 37
End: 2022-06-18
Payer: COMMERCIAL

## 2022-06-18 DIAGNOSIS — J30.1 ALLERGIC RHINITIS DUE TO POLLEN, UNSPECIFIED SEASONALITY: Primary | ICD-10-CM

## 2022-06-18 DIAGNOSIS — F90.0 ADHD (ATTENTION DEFICIT HYPERACTIVITY DISORDER), INATTENTIVE TYPE: ICD-10-CM

## 2022-06-19 DIAGNOSIS — R11.0 NAUSEA: ICD-10-CM

## 2022-06-20 RX ORDER — FEXOFENADINE HCL 60 MG/1
60 TABLET, FILM COATED ORAL 2 TIMES DAILY
Qty: 180 TABLET | Refills: 3 | Status: SHIPPED | OUTPATIENT
Start: 2022-06-20 | End: 2023-08-07

## 2022-06-20 RX ORDER — FLUTICASONE PROPIONATE 50 MCG
1 SPRAY, SUSPENSION (ML) NASAL DAILY
Qty: 16 G | Refills: 11 | Status: SHIPPED | OUTPATIENT
Start: 2022-06-20 | End: 2023-08-07

## 2022-06-20 RX ORDER — DEXTROAMPHETAMINE SACCHARATE, AMPHETAMINE ASPARTATE, DEXTROAMPHETAMINE SULFATE AND AMPHETAMINE SULFATE 3.75; 3.75; 3.75; 3.75 MG/1; MG/1; MG/1; MG/1
15 TABLET ORAL 2 TIMES DAILY
Qty: 60 TABLET | Refills: 0 | Status: SHIPPED | OUTPATIENT
Start: 2022-06-20 | End: 2022-06-21

## 2022-06-21 RX ORDER — ONDANSETRON 4 MG/1
TABLET, ORALLY DISINTEGRATING ORAL
Qty: 30 TABLET | Refills: 1 | Status: SHIPPED | OUTPATIENT
Start: 2022-06-21 | End: 2024-03-18

## 2022-06-21 RX ORDER — DEXTROAMPHETAMINE SACCHARATE, AMPHETAMINE ASPARTATE, DEXTROAMPHETAMINE SULFATE AND AMPHETAMINE SULFATE 3.75; 3.75; 3.75; 3.75 MG/1; MG/1; MG/1; MG/1
15 TABLET ORAL 2 TIMES DAILY
Qty: 60 TABLET | Refills: 0 | Status: SHIPPED | OUTPATIENT
Start: 2022-06-21 | End: 2022-06-27

## 2022-06-21 NOTE — TELEPHONE ENCOUNTER
Prescription approved per [facility] Refill Protocol.  Sanjuana Mullen, RN  [facility] RN Triage Team

## 2022-06-21 NOTE — TELEPHONE ENCOUNTER
Pt asking for [facility] [place] [facility]  Did just [facility].    Pended to new [facility]  Please send if appropriate.

## 2022-07-18 ENCOUNTER — VIRTUAL VISIT (OUTPATIENT)
Dept: FAMILY MEDICINE | Facility: CLINIC | Age: 37
End: 2022-07-18
Payer: COMMERCIAL

## 2022-07-18 DIAGNOSIS — Z13.0 SCREENING FOR DEFICIENCY ANEMIA: ICD-10-CM

## 2022-07-18 DIAGNOSIS — E03.9 HYPOTHYROIDISM, UNSPECIFIED TYPE: ICD-10-CM

## 2022-07-18 DIAGNOSIS — Z79.899 MEDICATION MANAGEMENT: ICD-10-CM

## 2022-07-18 DIAGNOSIS — F90.0 ADHD (ATTENTION DEFICIT HYPERACTIVITY DISORDER), INATTENTIVE TYPE: Primary | ICD-10-CM

## 2022-07-18 DIAGNOSIS — E78.5 HYPERLIPIDEMIA, UNSPECIFIED HYPERLIPIDEMIA TYPE: ICD-10-CM

## 2022-07-18 DIAGNOSIS — N76.0 BACTERIAL VAGINOSIS: ICD-10-CM

## 2022-07-18 DIAGNOSIS — B96.89 BACTERIAL VAGINOSIS: ICD-10-CM

## 2022-07-18 DIAGNOSIS — F41.9 ANXIETY: ICD-10-CM

## 2022-07-18 PROCEDURE — 99213 OFFICE O/P EST LOW 20 MIN: CPT | Mod: GT | Performed by: INTERNAL MEDICINE

## 2022-07-18 RX ORDER — LEVOTHYROXINE SODIUM 112 UG/1
112 TABLET ORAL DAILY
Qty: 90 TABLET | Refills: 1 | Status: SHIPPED | OUTPATIENT
Start: 2022-07-18 | End: 2022-08-28

## 2022-07-18 RX ORDER — METRONIDAZOLE 500 MG/1
500 TABLET ORAL 2 TIMES DAILY
Qty: 14 TABLET | Refills: 0 | Status: SHIPPED | OUTPATIENT
Start: 2022-07-18 | End: 2022-11-18

## 2022-07-18 RX ORDER — DEXTROAMPHETAMINE SACCHARATE, AMPHETAMINE ASPARTATE, DEXTROAMPHETAMINE SULFATE AND AMPHETAMINE SULFATE 3.75; 3.75; 3.75; 3.75 MG/1; MG/1; MG/1; MG/1
15 TABLET ORAL 2 TIMES DAILY
Qty: 60 TABLET | Refills: 0 | Status: SHIPPED | OUTPATIENT
Start: 2022-07-18 | End: 2022-08-25

## 2022-07-18 ASSESSMENT — PATIENT HEALTH QUESTIONNAIRE - PHQ9
10. IF YOU CHECKED OFF ANY PROBLEMS, HOW DIFFICULT HAVE THESE PROBLEMS MADE IT FOR YOU TO DO YOUR WORK, TAKE CARE OF THINGS AT HOME, OR GET ALONG WITH OTHER PEOPLE: NOT DIFFICULT AT ALL
SUM OF ALL RESPONSES TO PHQ QUESTIONS 1-9: 0
SUM OF ALL RESPONSES TO PHQ QUESTIONS 1-9: 0

## 2022-07-18 NOTE — PATIENT INSTRUCTIONS
Due for lab work  Please a schedule that at your earliest convenience  I am treating you empirically for bacterial vaginosis on your request  If your symptoms does not improve or get worse make sure you make an appointment for exam  Keep your appointment for physical as a scheduled

## 2022-07-18 NOTE — PROGRESS NOTES
Babita is a 36 year old who is being evaluated via a billable video visit.      How would you like to obtain your AVS? MyChart  If the video visit is dropped, the invitation should be resent by: Text to cell phone: [phone]  Will anyone else be joining your video visit? No          Assessment & Plan     Babita was seen today for recheck medication and uti.    Diagnoses and all orders for this visit:    ADHD (attention deficit hyperactivity disorder), inattentive type  -     amphetamine-dextroamphetamine (ADDERALL) 15 MG tablet; Take 1 tablet (15 mg) by mouth 2 times daily  Patient is a stable on this medication  She has signed treatment agreement in the past  Did not tolerate long-acting Adderall    Hypothyroidism, unspecified type  -     TSH WITH FREE T4 REFLEX; Future  -     levothyroxine (SYNTHROID/LEVOTHROID) 112 MCG tablet; Take 1 tablet (112 mcg) by mouth daily  Due for lab work    Anxiety  Doing well with current medication    Bacterial vaginosis  -     metroNIDAZOLE (FLAGYL) 500 MG tablet; Take 1 tablet (500 mg) by mouth 2 times daily for 7 days  Very typical symptoms of bacterial vaginosis  Ideally we should do pelvic exam and take 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 As a result, there may be errors in the script that have gone undetected. Please consider this when interpreting information found in this chart.       See Patient Instructions  Patient Instructions   Due for lab work  Please a schedule that at your earliest convenience  I am  treating you empirically for bacterial vaginosis on your request  If your symptoms does not improve or get worse make sure you make an appointment for exam  Keep your appointment for physical as a scheduled        Return in about 3 months (around 10/28/2022) for Physical Exam.    Staci Evans MD  [facility] [place] J[facility] is a 36 year old, presenting for the following health issues:  Recheck Medication and UTI (Pt states has some odor to urine, may need UA)      History of Present Illness       Reason for visit:  Follow up    She eats 4 or more servings of fruits and vegetables daily.She consumes 1 sweetened beverage(s) daily.She exercises with enough effort to increase her heart rate 20 to 29 minutes per day.  She exercises with enough effort to increase her heart rate 4 days per week.   She is taking medications regularly.    Today's PHQ-9         PHQ-9 Total Score: 0    PHQ-9 Q9 Thoughts of better off dead/self-harm past 2 weeks :   Not at all    How difficult have these problems made it for you to do your work, take care of things at home, or get along with other people: Not difficult at all       She was admitted between 12-1  She requested visit after 1  Our appointment was 11:30 and I was behind  So we switched her to 1:00    Review of Systems   Constitutional, HEENT, cardiovascular, pulmonary, GI, , musculoskeletal, neuro, skin, endocrine and psych systems are negative, except as otherwise noted.      Objective    Vitals - Patient Reported  Weight (Patient Reported): 52.2 kg (115 lb)  Pain Score: No Pain (0)      Vitals:  No vitals were obtained today due to virtual visit.    Physical Exam   GENERAL: Healthy, alert and no distress  EYES: Eyes grossly normal to inspection.  No discharge or erythema, or obvious scleral/conjunctival abnormalities.  RESP: No audible wheeze, cough, or visible cyanosis.  No visible retractions or increased work of breathing.    SKIN: Visible skin  clear. No significant rash, abnormal pigmentation or lesions.  NEURO: Cranial nerves grossly intact.  Mentation and speech appropriate for age.  PSYCH: Mentation appears normal, affect normal/bright, judgement and insight intact, normal speech and appearance well-groomed.                Video-Visit Details    Video Start Time: 1:07 PM    Type of service:  Video Visit    Video End Time:1:14 PM    Originating Location (pt. Location): Home    Distant Location (provider location):  [facility]     Platform used for Video Visit: [employer]    .  ..

## 2022-07-26 ENCOUNTER — APPOINTMENT (OUTPATIENT)
Dept: URBAN - METROPOLITAN AREA CLINIC 256 | Age: 37
Setting detail: DERMATOLOGY
End: 2022-07-26

## 2022-08-02 ENCOUNTER — APPOINTMENT (OUTPATIENT)
Dept: URBAN - METROPOLITAN AREA CLINIC 256 | Age: 37
Setting detail: DERMATOLOGY
End: 2022-08-02

## 2022-08-02 DIAGNOSIS — Z41.9 ENCOUNTER FOR PROCEDURE FOR PURPOSES OTHER THAN REMEDYING HEALTH STATE, UNSPECIFIED: ICD-10-CM

## 2022-08-02 PROCEDURE — OTHER DYSPORT (U OR CC): OTHER

## 2022-08-12 ENCOUNTER — APPOINTMENT (OUTPATIENT)
Dept: URBAN - METROPOLITAN AREA CLINIC 256 | Age: 37
Setting detail: DERMATOLOGY
End: 2022-08-12

## 2022-08-12 DIAGNOSIS — Z41.9 ENCOUNTER FOR PROCEDURE FOR PURPOSES OTHER THAN REMEDYING HEALTH STATE, UNSPECIFIED: ICD-10-CM

## 2022-08-12 PROCEDURE — OTHER DYSPORT (U OR CC): OTHER

## 2022-08-12 PROCEDURE — OTHER FILLERS: OTHER

## 2022-08-22 DIAGNOSIS — K21.9 GASTROESOPHAGEAL REFLUX DISEASE WITHOUT ESOPHAGITIS: ICD-10-CM

## 2022-08-24 DIAGNOSIS — E03.9 HYPOTHYROIDISM, UNSPECIFIED TYPE: ICD-10-CM

## 2022-08-25 ENCOUNTER — MYC REFILL (OUTPATIENT)
Dept: FAMILY MEDICINE | Facility: CLINIC | Age: 37
End: 2022-08-25

## 2022-08-25 ENCOUNTER — MYC MEDICAL ADVICE (OUTPATIENT)
Dept: FAMILY MEDICINE | Facility: CLINIC | Age: 37
End: 2022-08-25

## 2022-08-25 DIAGNOSIS — F90.0 ADHD (ATTENTION DEFICIT HYPERACTIVITY DISORDER), INATTENTIVE TYPE: ICD-10-CM

## 2022-08-26 DIAGNOSIS — K21.9 GASTROESOPHAGEAL REFLUX DISEASE WITHOUT ESOPHAGITIS: ICD-10-CM

## 2022-08-26 RX ORDER — DEXTROAMPHETAMINE SACCHARATE, AMPHETAMINE ASPARTATE, DEXTROAMPHETAMINE SULFATE AND AMPHETAMINE SULFATE 3.75; 3.75; 3.75; 3.75 MG/1; MG/1; MG/1; MG/1
15 TABLET ORAL 2 TIMES DAILY
Qty: 60 TABLET | Refills: 0 | Status: SHIPPED | OUTPATIENT
Start: 2022-08-26 | End: 2022-09-24

## 2022-08-26 NOTE — TELEPHONE ENCOUNTER
Routing refill request to provider for review/approval because:  Labs not current:  TSH  TSH   Date Value Ref Range Status   07/21/2021 2.29 0.40 - 4.00 mU/L Final   12/01/2020 0.36 (L) 0.40 - 4.00 mU/L Final     Ca Shaffer RN

## 2022-08-28 RX ORDER — LEVOTHYROXINE SODIUM 112 UG/1
112 TABLET ORAL DAILY
Qty: 60 TABLET | Refills: 0 | Status: SHIPPED | OUTPATIENT
Start: 2022-08-28 | End: 2022-09-28

## 2022-08-29 NOTE — TELEPHONE ENCOUNTER
amphetamine-dextroamphetamine (ADDERALL) 15 MG tablet    60 tablets with 0 refills sent on 8/26/2022    Lolis Kirk RN BSN MSN  [facility]

## 2022-08-30 ENCOUNTER — RX ONLY (RX ONLY)
Age: 37
End: 2022-08-30

## 2022-08-30 RX ORDER — MINOXIDIL 2.5 MG/1
TABLET ORAL
Qty: 30 | Refills: 11 | Status: ERX

## 2022-08-30 RX ORDER — BIMATOPROST 0.3 MG/ML
SOLUTION/ DROPS OPHTHALMIC
Qty: 5 | Refills: 11 | Status: ERX | COMMUNITY
Start: 2022-08-30

## 2022-08-30 RX ORDER — FLUOROURACIL 2 G/40G
CREAM TOPICAL
Qty: 40 | Refills: 2 | Status: ERX | COMMUNITY
Start: 2022-08-30

## 2022-08-30 NOTE — TELEPHONE ENCOUNTER
This refill request is a duplicate request, previously received or sent.  Sent denial notification to pharmacy.  Ca Shaffer RN  [facility] Triage Team

## 2022-09-26 ENCOUNTER — LAB (OUTPATIENT)
Dept: LAB | Facility: CLINIC | Age: 37
End: 2022-09-26
Payer: COMMERCIAL

## 2022-09-26 DIAGNOSIS — E78.5 HYPERLIPIDEMIA, UNSPECIFIED HYPERLIPIDEMIA TYPE: ICD-10-CM

## 2022-09-26 DIAGNOSIS — Z13.0 SCREENING FOR DEFICIENCY ANEMIA: ICD-10-CM

## 2022-09-26 DIAGNOSIS — E03.9 HYPOTHYROIDISM, UNSPECIFIED TYPE: ICD-10-CM

## 2022-09-26 DIAGNOSIS — Z79.899 MEDICATION MANAGEMENT: ICD-10-CM

## 2022-09-26 LAB
ERYTHROCYTE [DISTWIDTH] IN BLOOD BY AUTOMATED COUNT: 11.8 % (ref 10–15)
HCT VFR BLD AUTO: 40.1 % (ref 35–47)
HGB BLD-MCNC: 13.8 G/DL (ref 11.7–15.7)
MCH RBC QN AUTO: 32.9 PG (ref 26.5–33)
MCHC RBC AUTO-ENTMCNC: 34.4 G/DL (ref 31.5–36.5)
MCV RBC AUTO: 96 FL (ref 78–100)
PLATELET # BLD AUTO: 281 10E3/UL (ref 150–450)
RBC # BLD AUTO: 4.2 10E6/UL (ref 3.8–5.2)
WBC # BLD AUTO: 8.1 10E3/UL (ref 4–11)

## 2022-09-26 PROCEDURE — 85027 COMPLETE CBC AUTOMATED: CPT

## 2022-09-26 PROCEDURE — 36415 COLL VENOUS BLD VENIPUNCTURE: CPT

## 2022-09-26 PROCEDURE — 80061 LIPID PANEL: CPT

## 2022-09-26 PROCEDURE — 84443 ASSAY THYROID STIM HORMONE: CPT

## 2022-09-26 PROCEDURE — 80053 COMPREHEN METABOLIC PANEL: CPT

## 2022-09-27 LAB
ALBUMIN SERPL-MCNC: 3.7 G/DL (ref 3.4–5)
ALP SERPL-CCNC: 53 U/L (ref 40–150)
ALT SERPL W P-5'-P-CCNC: 28 U/L (ref 0–50)
ANION GAP SERPL CALCULATED.3IONS-SCNC: 8 MMOL/L (ref 3–14)
AST SERPL W P-5'-P-CCNC: 17 U/L (ref 0–45)
BILIRUB SERPL-MCNC: 0.2 MG/DL (ref 0.2–1.3)
BUN SERPL-MCNC: 15 MG/DL (ref 7–30)
CALCIUM SERPL-MCNC: 8.9 MG/DL (ref 8.5–10.1)
CHLORIDE BLD-SCNC: 109 MMOL/L (ref 94–109)
CHOLEST SERPL-MCNC: 158 MG/DL
CO2 SERPL-SCNC: 22 MMOL/L (ref 20–32)
CREAT SERPL-MCNC: 0.64 MG/DL (ref 0.52–1.04)
FASTING STATUS PATIENT QL REPORTED: YES
GFR SERPL CREATININE-BSD FRML MDRD: >90 ML/MIN/1.73M2
GLUCOSE BLD-MCNC: 85 MG/DL (ref 70–99)
HDLC SERPL-MCNC: 50 MG/DL
LDLC SERPL CALC-MCNC: 90 MG/DL
NONHDLC SERPL-MCNC: 108 MG/DL
POTASSIUM BLD-SCNC: 4.6 MMOL/L (ref 3.4–5.3)
PROT SERPL-MCNC: 7.2 G/DL (ref 6.8–8.8)
SODIUM SERPL-SCNC: 139 MMOL/L (ref 133–144)
TRIGL SERPL-MCNC: 92 MG/DL
TSH SERPL DL<=0.005 MIU/L-ACNC: 1.83 MU/L (ref 0.4–4)

## 2022-09-28 DIAGNOSIS — E03.9 HYPOTHYROIDISM, UNSPECIFIED TYPE: ICD-10-CM

## 2022-09-28 RX ORDER — LEVOTHYROXINE SODIUM 112 UG/1
112 TABLET ORAL DAILY
Qty: 90 TABLET | Refills: 1 | Status: SHIPPED | OUTPATIENT
Start: 2022-09-28 | End: 2022-11-30

## 2022-09-28 NOTE — RESULT ENCOUNTER NOTE
Zahra Jc,    This is to inform you regarding your test result.    TSH which is thyroid hormone is normal.  Stay on current dose of levothyroxine.  Recheck TSH in 6 months  Your total cholesterol is normal.  HDL which is called good cholesterol is normal.  Your LDL cholesterol is normal.  This is often call bad cholesterol and high levels increase the risk for heart attacks and strokes.  Your triglycerides are normal.  The testing of your blood sugar, kidney function, liver function and electrolytes was normal.  CBC result which includes white count Hemoglobin and  Platelet Counts is normal.         Sincerely,      Dr.Nasima Nathan MD,FACP

## 2022-10-21 ENCOUNTER — RX ONLY (RX ONLY)
Age: 37
End: 2022-10-21

## 2022-10-21 RX ORDER — AMOXICILLIN 875 MG/1
TABLET, FILM COATED ORAL
Qty: 20 | Refills: 0 | Status: ERX | COMMUNITY
Start: 2022-10-21

## 2022-10-21 RX ORDER — PREDNISONE 10 MG/1
TABLET ORAL
Qty: 20 | Refills: 0 | Status: ERX | COMMUNITY
Start: 2022-10-21

## 2022-10-21 RX ORDER — PREDNISONE 2.5 MG/1
TABLET ORAL
Qty: 10 | Refills: 0 | Status: CANCELLED | COMMUNITY
Start: 2022-10-21

## 2022-10-21 RX ORDER — PREDNISONE 20 MG/1
TABLET ORAL
Qty: 10 | Refills: 0 | Status: CANCELLED | COMMUNITY
Start: 2022-10-21

## 2022-10-21 RX ORDER — PREDNISONE 20 MG/1
TABLET ORAL
Qty: 10 | Refills: 0 | Status: CANCELLED
Stop reason: ENTERED-IN-ERROR

## 2022-10-26 ENCOUNTER — MYC REFILL (OUTPATIENT)
Dept: FAMILY MEDICINE | Facility: CLINIC | Age: 37
End: 2022-10-26

## 2022-10-26 ENCOUNTER — MYC MEDICAL ADVICE (OUTPATIENT)
Dept: FAMILY MEDICINE | Facility: CLINIC | Age: 37
End: 2022-10-26

## 2022-10-26 DIAGNOSIS — F90.0 ADHD (ATTENTION DEFICIT HYPERACTIVITY DISORDER), INATTENTIVE TYPE: ICD-10-CM

## 2022-10-26 RX ORDER — DEXTROAMPHETAMINE SACCHARATE, AMPHETAMINE ASPARTATE, DEXTROAMPHETAMINE SULFATE AND AMPHETAMINE SULFATE 3.75; 3.75; 3.75; 3.75 MG/1; MG/1; MG/1; MG/1
15 TABLET ORAL 2 TIMES DAILY
Qty: 60 TABLET | Refills: 0 | Status: SHIPPED | OUTPATIENT
Start: 2022-10-26 | End: 2022-11-23

## 2022-10-27 NOTE — TELEPHONE ENCOUNTER
Left pt voice message to call back to schedule an appointment with .    Yamini Johnson MA on 10/27/2022 at 9:35 AM

## 2022-11-03 ENCOUNTER — RX ONLY (RX ONLY)
Age: 37
End: 2022-11-03

## 2022-11-03 RX ORDER — SULFAMETHOXAZOLE AND TRIMETHOPRIM 800; 160 MG/1; MG/1
TABLET ORAL
Qty: 14 | Refills: 0 | Status: ERX | COMMUNITY
Start: 2022-11-03

## 2022-11-14 ENCOUNTER — TRANSFERRED RECORDS (OUTPATIENT)
Dept: HEALTH INFORMATION MANAGEMENT | Facility: CLINIC | Age: 37
End: 2022-11-14

## 2022-11-16 ENCOUNTER — RX ONLY (RX ONLY)
Age: 37
End: 2022-11-16

## 2022-11-16 RX ORDER — FLUCONAZOLE 150 MG/1
TABLET ORAL
Qty: 1 | Refills: 3 | Status: ERX | COMMUNITY
Start: 2022-11-16

## 2022-11-28 ENCOUNTER — MYC MEDICAL ADVICE (OUTPATIENT)
Dept: FAMILY MEDICINE | Facility: CLINIC | Age: 37
End: 2022-11-28

## 2022-11-29 ENCOUNTER — MYC MEDICAL ADVICE (OUTPATIENT)
Dept: FAMILY MEDICINE | Facility: CLINIC | Age: 37
End: 2022-11-29

## 2022-11-30 ENCOUNTER — NURSE TRIAGE (OUTPATIENT)
Dept: NURSING | Facility: CLINIC | Age: 37
End: 2022-11-30

## 2022-11-30 ENCOUNTER — TELEPHONE (OUTPATIENT)
Dept: FAMILY MEDICINE | Facility: CLINIC | Age: 37
End: 2022-11-30

## 2022-11-30 DIAGNOSIS — F90.0 ADHD (ATTENTION DEFICIT HYPERACTIVITY DISORDER), INATTENTIVE TYPE: ICD-10-CM

## 2022-11-30 DIAGNOSIS — E03.9 HYPOTHYROIDISM, UNSPECIFIED TYPE: ICD-10-CM

## 2022-11-30 RX ORDER — LEVOTHYROXINE SODIUM 112 UG/1
112 TABLET ORAL DAILY
Qty: 90 TABLET | Refills: 1 | Status: SHIPPED | OUTPATIENT
Start: 2022-11-30 | End: 2022-11-30

## 2022-11-30 RX ORDER — DEXTROAMPHETAMINE SACCHARATE, AMPHETAMINE ASPARTATE, DEXTROAMPHETAMINE SULFATE AND AMPHETAMINE SULFATE 3.75; 3.75; 3.75; 3.75 MG/1; MG/1; MG/1; MG/1
15 TABLET ORAL 2 TIMES DAILY
Qty: 60 TABLET | Refills: 0 | Status: SHIPPED | OUTPATIENT
Start: 2022-11-30 | End: 2022-12-30

## 2022-11-30 RX ORDER — DEXTROAMPHETAMINE SACCHARATE, AMPHETAMINE ASPARTATE, DEXTROAMPHETAMINE SULFATE AND AMPHETAMINE SULFATE 3.75; 3.75; 3.75; 3.75 MG/1; MG/1; MG/1; MG/1
15 TABLET ORAL 2 TIMES DAILY
Qty: 60 TABLET | Refills: 0 | Status: SHIPPED | OUTPATIENT
Start: 2022-11-30 | End: 2022-11-30

## 2022-11-30 RX ORDER — LEVOTHYROXINE SODIUM 112 UG/1
112 TABLET ORAL DAILY
Qty: 90 TABLET | Refills: 1 | Status: SHIPPED | OUTPATIENT
Start: 2022-11-30 | End: 2022-12-30

## 2022-11-30 NOTE — TELEPHONE ENCOUNTER
Called and spoke with the patient. Upon chart review, patient should still have refill on file. Patient stated she was unable to fill it and would like another script sent to the pharmacy.     Patient also stated that she was unable to fill her script for Adderall that was sent on 11/23/22 due to her pharmacy being out. Pt requesting another script. RN called [facility] on [address] in [place] to confirm if medication available.     RN call pharmacy. Adderall NOT available at this pharmacy.     RN called pt back, but unable to leave VM. Sent pt a [employer]ar[employer] message advising her to call other pharmacies to check if they have Adderall in stock and to let clinic know if she would still like Levothyroxine sent to pended pharmacy.     Routing to PCP to see if PCP has any insight on which pharmacy would have Adderrall? Or Wait to see if pt is able to find pharmacy with Adderall in stock?    Patient states she needs her medications today as she is leaving out of town tomorrow. Pt would like a call back once scripts are filled.     Donnell Umana RN

## 2022-11-30 NOTE — TELEPHONE ENCOUNTER
Patient is in need of refills for her Adderall and Thyroid medications. She leaves town tomorrow so needs them sent today to the [facility] in [place] on [address], [address].  [phone].  Please call patient today at:  [phone].  I sent an high priority refill request to their pool, the primary and the triage at [facility].  Radha Fuentes RN  [facility]    Reason for Disposition    Pharmacy calling with prescription questions and triager unable to answer question    Additional Information    Negative: New-onset or worsening symptoms, see that protocol (e.g., diarrhea, runny nose, sore throat)    Negative: Medicine question not related to refill or renewal    Negative: Caller (e.g., patient or pharmacist) requesting information about a new medicine    Negative: Caller requesting information unrelated to medicine    Negative: Prescription refill request for ESSENTIAL medicine (i.e., likelihood of harm to patient if not taken) and triager unable to refill per department policy    Negative: Prescription not at pharmacy and was prescribed by PCP recently  (Exception: triager has access to EMR and prescription is recorded there. Go to Home Care and confirm for pharmacy.)    Protocols used: MEDICATION REFILL AND RENEWAL CALL-A-OH

## 2022-11-30 NOTE — TELEPHONE ENCOUNTER
FYI  Spoke to patient   Checked with [facility]  They have it   Patient agreed for this pharmacy  Sent both script   Dr.Nasima Nathan MD

## 2022-11-30 NOTE — TELEPHONE ENCOUNTER
Patient is in need of refills for her Adderall and Thyroid medications. She leaves town tomorrow so needs them sent today to the [facility] on [address], [address].  [phone].  Please call patient today at:  [phone].    Radha Fuentes RN  [facility]

## 2022-12-01 NOTE — TELEPHONE ENCOUNTER
Per chart review, PCP contacted patient directly 11/30/22 and sent medication to  pharmacy. Pt agreed, closing encounter.

## 2022-12-01 NOTE — TELEPHONE ENCOUNTER
PCP addressed this in separate encounter    Camille CARTER, Triage RN  [facility]

## 2023-01-26 ENCOUNTER — MYC REFILL (OUTPATIENT)
Dept: FAMILY MEDICINE | Facility: CLINIC | Age: 38
End: 2023-01-26
Payer: COMMERCIAL

## 2023-01-26 DIAGNOSIS — F90.0 ADHD (ATTENTION DEFICIT HYPERACTIVITY DISORDER), INATTENTIVE TYPE: ICD-10-CM

## 2023-01-26 RX ORDER — DEXTROAMPHETAMINE SACCHARATE, AMPHETAMINE ASPARTATE, DEXTROAMPHETAMINE SULFATE AND AMPHETAMINE SULFATE 3.75; 3.75; 3.75; 3.75 MG/1; MG/1; MG/1; MG/1
15 TABLET ORAL 2 TIMES DAILY
Qty: 60 TABLET | Refills: 0 | OUTPATIENT
Start: 2023-01-26

## 2023-01-30 ENCOUNTER — MYC MEDICAL ADVICE (OUTPATIENT)
Dept: FAMILY MEDICINE | Facility: CLINIC | Age: 38
End: 2023-01-30

## 2023-01-30 ENCOUNTER — TELEPHONE (OUTPATIENT)
Dept: FAMILY MEDICINE | Facility: CLINIC | Age: 38
End: 2023-01-30

## 2023-01-30 ENCOUNTER — OFFICE VISIT (OUTPATIENT)
Dept: FAMILY MEDICINE | Facility: CLINIC | Age: 38
End: 2023-01-30
Payer: COMMERCIAL

## 2023-01-30 VITALS
RESPIRATION RATE: 16 BRPM | DIASTOLIC BLOOD PRESSURE: 80 MMHG | OXYGEN SATURATION: 100 % | SYSTOLIC BLOOD PRESSURE: 120 MMHG | HEART RATE: 91 BPM | WEIGHT: 120.5 LBS | HEIGHT: 64 IN | BODY MASS INDEX: 20.57 KG/M2 | TEMPERATURE: 97.8 F

## 2023-01-30 DIAGNOSIS — Z79.899 CONTROLLED SUBSTANCE AGREEMENT SIGNED: ICD-10-CM

## 2023-01-30 DIAGNOSIS — J31.0 CHRONIC RHINITIS: ICD-10-CM

## 2023-01-30 DIAGNOSIS — Z00.00 ROUTINE GENERAL MEDICAL EXAMINATION AT A HEALTH CARE FACILITY: Primary | ICD-10-CM

## 2023-01-30 DIAGNOSIS — J34.89 NASAL DRAINAGE: ICD-10-CM

## 2023-01-30 DIAGNOSIS — F90.0 ADHD (ATTENTION DEFICIT HYPERACTIVITY DISORDER), INATTENTIVE TYPE: ICD-10-CM

## 2023-01-30 DIAGNOSIS — E03.9 HYPOTHYROIDISM, UNSPECIFIED TYPE: ICD-10-CM

## 2023-01-30 DIAGNOSIS — Z23 NEED FOR PROPHYLACTIC VACCINATION AND INOCULATION AGAINST INFLUENZA: ICD-10-CM

## 2023-01-30 LAB — TSH SERPL DL<=0.005 MIU/L-ACNC: 0.72 UIU/ML (ref 0.3–4.2)

## 2023-01-30 PROCEDURE — 90471 IMMUNIZATION ADMIN: CPT | Performed by: INTERNAL MEDICINE

## 2023-01-30 PROCEDURE — 99395 PREV VISIT EST AGE 18-39: CPT | Mod: 25 | Performed by: INTERNAL MEDICINE

## 2023-01-30 PROCEDURE — 36415 COLL VENOUS BLD VENIPUNCTURE: CPT | Performed by: INTERNAL MEDICINE

## 2023-01-30 PROCEDURE — 84443 ASSAY THYROID STIM HORMONE: CPT | Performed by: INTERNAL MEDICINE

## 2023-01-30 PROCEDURE — 99214 OFFICE O/P EST MOD 30 MIN: CPT | Mod: 25 | Performed by: INTERNAL MEDICINE

## 2023-01-30 PROCEDURE — 90686 IIV4 VACC NO PRSV 0.5 ML IM: CPT | Performed by: INTERNAL MEDICINE

## 2023-01-30 RX ORDER — DEXTROAMPHETAMINE SACCHARATE, AMPHETAMINE ASPARTATE, DEXTROAMPHETAMINE SULFATE AND AMPHETAMINE SULFATE 3.75; 3.75; 3.75; 3.75 MG/1; MG/1; MG/1; MG/1
15 TABLET ORAL 2 TIMES DAILY
Qty: 180 TABLET | Refills: 0 | Status: SHIPPED | OUTPATIENT
Start: 2023-01-30 | End: 2023-01-30

## 2023-01-30 RX ORDER — LEVOTHYROXINE SODIUM 112 UG/1
112 TABLET ORAL DAILY
Qty: 90 TABLET | Refills: 3 | Status: SHIPPED | OUTPATIENT
Start: 2023-01-30 | End: 2024-02-09

## 2023-01-30 RX ORDER — DEXTROAMPHETAMINE SACCHARATE, AMPHETAMINE ASPARTATE, DEXTROAMPHETAMINE SULFATE AND AMPHETAMINE SULFATE 2.5; 2.5; 2.5; 2.5 MG/1; MG/1; MG/1; MG/1
15 TABLET ORAL 2 TIMES DAILY
Qty: 180 TABLET | Refills: 0 | Status: SHIPPED | OUTPATIENT
Start: 2023-01-30 | End: 2023-03-29

## 2023-01-30 ASSESSMENT — PAIN SCALES - GENERAL: PAINLEVEL: NO PAIN (0)

## 2023-01-30 ASSESSMENT — PATIENT HEALTH QUESTIONNAIRE - PHQ9: SUM OF ALL RESPONSES TO PHQ QUESTIONS 1-9: 0

## 2023-01-30 NOTE — PATIENT INSTRUCTIONS
Labs today  You will be  due for mammogram at age 38  Please call the following number to make appointment :  [phone]  It is located in [address]  Follow up in 6 months.  Seek sooner medical attention if there is any worsening of symptoms or problems.       Preventive Health Recommendations  Female Ages 26 - 39  Yearly exam:   See your health care provider every year in order to  Review health changes.   Discuss preventive care.    Review your medicines if you your doctor has prescribed any.    Until age 30: Get a Pap test every three years (more often if you have had an abnormal result).    After age 30: Talk to your doctor about whether you should have a Pap test every 3 years or have a Pap test with HPV screening every 5 years.   You do not need a Pap test if your uterus was removed (hysterectomy) and you have not had cancer.  You should be tested each year for STDs (sexually transmitted diseases), if you're at risk.   Talk to your provider about how often to have your cholesterol checked.  If you are at risk for diabetes, you should have a diabetes test (fasting glucose).  Shots: Get a flu shot each year. Get a tetanus shot every 10 years.   Nutrition:   Eat at least 5 servings of fruits and vegetables each day.  Eat whole-grain bread, whole-wheat pasta and brown rice instead of white grains and rice.  Get adequate Calcium and Vitamin D.     Lifestyle  Exercise at least 150 minutes a week (30 minutes a day, 5 days of the week). This will help you control your weight and prevent disease.  Limit alcohol to one drink per day.  No smoking.   Wear sunscreen to prevent skin cancer.  See your dentist every six months for an exam and cleaning.

## 2023-01-30 NOTE — TELEPHONE ENCOUNTER
Patient calling to state her pharmacy is out of Adderall 15 mg tablets; however, the [facility] in [place] on [address] has 10 mg tablets available for a 1 month supply only.     Patient requesting provider send rx to pended pharmacy. Patient will need to take one whole 10 mg tablet and then cut another 10 mg tablet in half to get her dose.     Patient requesting call back to [phone] once provider sends rx to updated pharmacy.   Okay to leave detailed message.      Lolis Kirk RN BSN MSN  [facility]

## 2023-01-30 NOTE — PROGRESS NOTES
SUBJECTIVE:   CC: Babita is an 37 year old who presents for preventive health visit.   Patient has been advised of split billing requirements and indicates understanding: Yes  Healthy Habits:     Getting at least 3 servings of Calcium per day:  Yes    Bi-annual eye exam:  NO    Dental care twice a year:  Yes    Sleep apnea or symptoms of sleep apnea:  None    Diet:  Regular (no restrictions)    Frequency of exercise:  2-3 days/week    Duration of exercise:  45-60 minutes    Taking medications regularly:  Yes    Barriers to taking medications:  None    Medication side effects:  None    PHQ-2 Total Score: 0    Additional concerns today:  No        Today's PHQ-2 Score:   PHQ-2 ( 1999 Pfizer) 1/30/2023   Q1: Little interest or pleasure in doing things 0   Q2: Feeling down, depressed or hopeless 0   PHQ-2 Score 0   PHQ-2 Total Score (12-17 Years)- Positive if 3 or more points; Administer PHQ-A if positive -       Have you ever done Advance Care Planning? (For example, a Health Directive, POLST, or a discussion with a medical provider or your loved ones about your wishes): No, advance care planning information given to patient to review.  Patient declined advance care planning discussion at this time.    Social History     Tobacco Use     Smoking status: Never     Smokeless tobacco: Never   Substance Use Topics     Alcohol use: Yes     Comment: Once a week     If you drink alcohol do you typically have >3 drinks per day or >7 drinks per week? No    Alcohol Use 1/30/2023   Prescreen: >3 drinks/day or >7 drinks/week? No       Reviewed orders with patient.  Reviewed health maintenance and updated orders accordingly - Yes  Labs reviewed in EPIC    Breast Cancer Screening:  Any new diagnosis of family breast, ovarian, or bowel cancer? No    FHS-7: No flowsheet data found.    Patient under 40 years of age: Routine Mammogram Screening not recommended.   Pertinent mammograms are reviewed under the imaging tab.    History of  "abnormal Pap smear: NO - age 30-65 PAP every 5 years with negative HPV co-testing recommended  PAP / HPV Latest Ref Rng & Units 12/1/2020 10/2/2017 8/13/2015   PAP (Historical) - NIL ASC-US(A) NIL   HPV16 NEG:Negative Negative Negative -   HPV18 NEG:Negative Negative Negative -   HRHPV NEG:Negative Negative Negative -     Reviewed and updated as needed this visit by clinical staff   Tobacco  Allergies  Meds              Reviewed and updated as needed this visit by Provider                 Past Medical History:   Diagnosis Date     Anxiety      ASCUS of cervix with negative high risk HPV 10/02/2017    10/2/17 ASCUS, Neg HPV     Constipation      Ectopic pregnancy 9/26/11     Thyroid disease     hypothyroidism        Review of Systems  CONSTITUTIONAL: NEGATIVE for fever, chills, change in weight  INTEGUMENTARU/SKIN: NEGATIVE for worrisome rashes, moles or lesions  EYES: NEGATIVE for vision changes or irritation  ENT: NEGATIVE for ear, mouth and throat problems  RESP: NEGATIVE for significant cough or SOB  BREAST: NEGATIVE for masses, tenderness or discharge  CV: NEGATIVE for chest pain, palpitations or peripheral edema  GI: NEGATIVE for nausea, abdominal pain, heartburn, or change in bowel habits  : NEGATIVE for unusual urinary or vaginal symptoms. Periods are regular.  MUSCULOSKELETAL: NEGATIVE for significant arthralgias or myalgia  NEURO: NEGATIVE for weakness, dizziness or paresthesias  PSYCHIATRIC: NEGATIVE for changes in mood or affect     OBJECTIVE:   /80 (BP Location: Right arm, Patient Position: Sitting, Cuff Size: Adult Regular)   Pulse 91   Temp 97.8  F (36.6  C) (Oral)   Resp 16   Ht 1.62 m (5' 3.78\")   Wt 54.7 kg (120 lb 8 oz)   LMP 01/30/2023   SpO2 100%   BMI 20.83 kg/m       Physical Exam  GENERAL: healthy, alert and no distress  EYES: Eyes grossly normal to inspection, PERRL and conjunctivae and sclerae normal  HENT: ear canals and TM's normal, nose and mouth without ulcers or " lesions  NECK: no adenopathy, no asymmetry, masses, or scars and thyroid normal to palpation  RESP: lungs clear to auscultation - no rales, rhonchi or wheezes  BREAST: normal without masses, tenderness or nipple discharge and no palpable axillary masses or adenopathy, scar of breast reduction surgery on both breasts.   CV: regular rate and rhythm, normal S1 S2, no S3 or S4, no murmur, click or rub, no peripheral edema and peripheral pulses strong  ABDOMEN: soft, nontender, no hepatosplenomegaly, no masses and bowel sounds normal  MS: no gross musculoskeletal defects noted, no edema  SKIN: dry skin, freckles throughout the body  NEURO: Normal strength and tone, mentation intact and speech normal  PSYCH: mentation appears normal, affect normal/bright    Labs pending    Lab on 09/26/2022   Component Date Value Ref Range Status     TSH 09/26/2022 1.83  0.40 - 4.00 mU/L Final     Cholesterol 09/26/2022 158  <200 mg/dL Final     Triglycerides 09/26/2022 92  <150 mg/dL Final     Direct Measure HDL 09/26/2022 50  >=50 mg/dL Final     LDL Cholesterol Calculated 09/26/2022 90  <=100 mg/dL Final     Non HDL Cholesterol 09/26/2022 108  <130 mg/dL Final     Patient Fasting > 8hrs? 09/26/2022 Yes   Final     WBC Count 09/26/2022 8.1  4.0 - 11.0 10e3/uL Final     RBC Count 09/26/2022 4.20  3.80 - 5.20 10e6/uL Final     Hemoglobin 09/26/2022 13.8  11.7 - 15.7 g/dL Final     Hematocrit 09/26/2022 40.1  35.0 - 47.0 % Final     MCV 09/26/2022 96  78 - 100 fL Final     MCH 09/26/2022 32.9  26.5 - 33.0 pg Final     MCHC 09/26/2022 34.4  31.5 - 36.5 g/dL Final     RDW 09/26/2022 11.8  10.0 - 15.0 % Final     Platelet Count 09/26/2022 281  150 - 450 10e3/uL Final     Sodium 09/26/2022 139  133 - 144 mmol/L Final     Potassium 09/26/2022 4.6  3.4 - 5.3 mmol/L Final     Chloride 09/26/2022 109  94 - 109 mmol/L Final     Carbon Dioxide (CO2) 09/26/2022 22  20 - 32 mmol/L Final     Anion Gap 09/26/2022 8  3 - 14 mmol/L Final     Urea Nitrogen  09/26/2022 15  7 - 30 mg/dL Final     Creatinine 09/26/2022 0.64  0.52 - 1.04 mg/dL Final     Calcium 09/26/2022 8.9  8.5 - 10.1 mg/dL Final     Glucose 09/26/2022 85  70 - 99 mg/dL Final     Alkaline Phosphatase 09/26/2022 53  40 - 150 U/L Final     AST 09/26/2022 17  0 - 45 U/L Final     ALT 09/26/2022 28  0 - 50 U/L Final     Protein Total 09/26/2022 7.2  6.8 - 8.8 g/dL Final     Albumin 09/26/2022 3.7  3.4 - 5.0 g/dL Final     Bilirubin Total 09/26/2022 0.2  0.2 - 1.3 mg/dL Final     GFR Estimate 09/26/2022 >90  >60 mL/min/1.73m2 Final    Effective December 21, 2021 eGFRcr in adults is calculated using the 2021 CKD-EPI creatinine equation which includes age and gender (Emily et al., NE, DOI: 10.1056/DNOYvh8996064)         ASSESSMENT/PLAN:   Babita was seen today for physical.    Diagnoses and all orders for this visit:    Routine general medical examination at a health care facility        - Reviewed pap smear in 2022, normal; HPV negative         - Provided information on Mamogram which she can get it after 07/2023    Chronic rhinitis        - She follows with ENT specialist        - She is considering rhinoplasty and is currently checking with her insurance  His father is a urologist at [facility] and trying to schedule an appointment for her at [facility]    Nasal drainage she has ongoing drainage from her nose        - Using saline nasal drop         - She follows with ENT specialist  There is a concern about CSF leak as her father discussed her symptoms with ENT at [facility]  She will reschedule an appointment    Hypothyroidism, unspecified type  -     Taking levothyroxine  - TSH with free T4 reflex; Future  -     levothyroxine (SYNTHROID/LEVOTHROID) 112 MCG tablet; Take 1 tablet (112 mcg) by mouth daily    Controlled substance agreement signed        - Patient was counceled on Adderall and signed agreement    ADHD (attention deficit hyperactivity disorder), inattentive type  -     Provided information on vyvanse and  Concerta  - She is taking adderall; she notes that she is recently having difficulty receiving the medication as they are out of inventory at her pharmacy  - amphetamine-dextroamphetamine (ADDERALL) 15 MG tablet; Take 1 tablet (15 mg) by mouth 2 times daily  We gave her written prescription but then she found out that 10 mg is available in one of the pharmacy  She requested to take 1-1/2 twice a day  Prescription is sent to the pharmacy  There is a shortage nationwide about this medication    Other orders  -     REVIEW OF HEALTH MAINTENANCE PROTOCOL ORDERS    Other        - Offered COVID BOOSTER and patient declined         - Offered INFLUENZA vaccine and patient accepted     Patient has been advised of split billing requirements and indicates understanding: Yes    Follow-up in 6 months    COUNSELING:  Reviewed preventive health counseling, as reflected in patient instructions       Regular exercise       Healthy diet/nutrition       Immunizations    She reports that she has never smoked. She has never used smokeless tobacco.      Staci Evans MD  [facility]    This document serves as a record of the services and decisions personally performed and made by Dr. Evans. It was created on her behalf by Blake Goodwin, a trained medical scribe. The creation of this document is based the provider's statements to the medical scribe.

## 2023-01-30 NOTE — TELEPHONE ENCOUNTER
Call to patient. No answer. Mailbox full. Unable to leave message.     MyChart message sent to patient.     Keeping encounter open until patient has read message or nursing able to speak with patient/leave detailed message for patient.       Lolis Kirk, RN BSN MSN  [facility]

## 2023-01-30 NOTE — LETTER
[facility]  01/30/23  Patient: Babita Hartley  YOB: 1985  Medical Record Number: [id number]                                                                                  Non-Opioid Controlled Substance Agreement    This is an agreement between you and your provider regarding safe and appropriate use of controlled substances prescribed by your care team. Controlled substances are?medicines that can cause physical and mental dependence (abuse).     There are strict laws about having and using these medicines. We here at [facility] are  committed to working with you in your efforts to get better. To support you in this work, we'll help you schedule regular office appointments for medicine refills. If we must cancel or change your appointment for any reason, we'll make sure you have enough medicine to last until your next appointment.     As a Provider, I will:     Listen carefully to your concerns while treating you with respect.     Recommend a treatment plan that I believe is in your best interest and may involve therapies other than medicine.      Talk with you often about the possible benefits and the risk of harm of any medicine that we prescribe for you.    Assess the safety of this medicine and check how well it works.      Provide a plan on how to taper (discontinue or go off) using this medicine if the decision is made to stop its use.      ::  As a Patient, I understand controlled substances:       Are prescribed by my care provider to help me function or work and manage my condition(s).?    Are strong medicines and can cause serious side effects.       Need to be taken exactly as prescribed.?Combining controlled substances with certain medicines or chemicals (such as illegal drugs, alcohol, sedatives, sleeping pills, and benzodiazepines) can be dangerous or even fatal.? If I stop taking my medicines suddenly, I may have severe withdrawal symptoms.     The  risks, benefits, and side effects of these medicine(s) were explained to me. I agree that:    1. I will take part in other treatments as advised by my care team. This may be psychiatry or counseling, physical therapy, behavioral therapy, group treatment or a referral to specialist.    2. I will keep all my appointments and understand this is part of the monitoring of controlled substances.?My care team may require an office visit for EVERY controlled substance refill. If I miss appointments or don t follow instructions, my care team may stop my medicine    3. I will take my medicines as prescribed. I will not change the dose or schedule unless my care team tells me to. There will be no refills if I run out early.      4. I may be asked to come to the clinic and complete a urine drug test or complete a pill count. If I don t give a urine sample or participate in a pill count, the care team may stop my medicine.    5. I will only receive controlled substance prescriptions from this clinic. If I am treated by another provider, I will tell them that I am taking controlled substances and that I have a treatment agreement with this provider. I will inform my [facility] care team within one business day if I am given a prescription for any controlled substance by another healthcare provider. My [facility] care team can contact other providers and pharmacists about my use of any medicines.    6. It is up to me to make sure that I don't run out of my medicines on weekends or holidays.?If my care team is willing to refill my prescription without a visit, I must request refills only during office hours. Refills may take up to 3 business days to process. I will use one pharmacy to fill all my controlled substance prescriptions. I will notify the clinic about any changes to my insurance or medicine availability.    7. I am responsible for my prescriptions. If the medicine/prescription is lost, stolen or destroyed,  it will not be replaced.?I also agree not to share controlled substance medicines with anyone.     8. I am aware I should not use any illegal or recreational drugs. I agree not to drink alcohol unless my care team says I can.     9. If I enroll in the [place] Medical Cannabis program, I will tell my care team before my next refill.    10. I will tell my care team right away if I become pregnant, have a new medical problem treated outside of my regular clinic, or have a change in my medicines.     11. I understand that this medicine can affect my thinking, judgment and reaction time.? Alcohol and drugs affect the brain and body, which can affect the safety of my driving. Being under the influence of alcohol or drugs can affect my decision-making, behaviors, personal safety and the safety of others. Driving while impaired (DWI) can occur if a person is driving, operating or in physical control of a car, motorcycle, boat, snowmobile, ATV, motorbike, off-road vehicle or any other motor vehicle ([place] Statute 169A.20). I understand the risk if I choose to drive or operate any vehicle or machinery.    I understand that if I do not follow any of the conditions above, my prescriptions or treatment may be stopped or changed.   I agree that my provider, clinic care team and pharmacy may work with any city, state or federal law enforcement agency that investigates the misuse, sale or other diversion of my controlled medicine. I will allow my provider to discuss my care with, or share a copy of, this agreement with any other treating provider, pharmacy or emergency room where I receive care.     I have read this agreement and have asked questions about anything I did not understand.    ________________________________________________________  Patient Signature - Babita Hartley     ___________________                   Date     ________________________________________________________  Provider Signature - Staci Evans MD        ___________________                   Date     ________________________________________________________  Witness Signature (required if provider not present while patient signing)          ___________________                   Date

## 2023-01-31 NOTE — TELEPHONE ENCOUNTER
See 1/30/23 phone encounter, this was addressed    Camille CARTER, Triage RN  [facility]

## 2023-01-31 NOTE — TELEPHONE ENCOUNTER
See 1/30/23 phone encounter, this was addressed    Camille CARTER, Triage RN  [facility]

## 2023-02-20 DIAGNOSIS — L70.0 ACNE VULGARIS: ICD-10-CM

## 2023-02-20 RX ORDER — SPIRONOLACTONE 100 MG/1
100 TABLET, FILM COATED ORAL DAILY
Qty: 90 TABLET | Refills: 1 | Status: SHIPPED | OUTPATIENT
Start: 2023-02-20 | End: 2023-08-28

## 2023-05-26 ENCOUNTER — RX ONLY (RX ONLY)
Age: 38
End: 2023-05-26

## 2023-05-26 RX ORDER — D-METHORPHAN/PE/ACETAMINOPHEN 20-10-500
POWDER IN PACKET (EA) ORAL
Qty: 16.9 | Refills: 3 | Status: ACTIVE

## 2023-07-05 ENCOUNTER — RX ONLY (RX ONLY)
Age: 38
End: 2023-07-05

## 2023-07-05 RX ORDER — CIPROFLOXACIN 250 MG/1
TABLET, FILM COATED ORAL
Qty: 10 | Refills: 2 | Status: ERX | COMMUNITY
Start: 2023-07-05

## 2023-07-05 RX ORDER — CIPROFLOXACIN 500 MG/1
TABLET, FILM COATED ORAL
Qty: 10 | Refills: 2 | Status: CANCELLED
Stop reason: ENTERED-IN-ERROR

## 2023-07-14 ENCOUNTER — RX ONLY (RX ONLY)
Age: 38
End: 2023-07-14

## 2023-07-14 RX ORDER — MINOXIDIL 2.5 MG/1
TABLET ORAL
Qty: 45 | Refills: 3 | Status: ERX

## 2023-07-14 RX ORDER — IPRATROPIUM BROMIDE 42 UG/1
SPRAY NASAL
Qty: 15 | Refills: 11 | Status: ERX | COMMUNITY
Start: 2023-07-14

## 2023-08-18 NOTE — TELEPHONE ENCOUNTER
"Pending Prescriptions:                       Disp   Refills    omeprazole (PRILOSEC) 20 MG DR capsule [P*90 cap*0            Sig: TAKE ONE CAPSULE BY MOUTH DAILY    Last Written Prescription Date:  08/16/2017  Last Fill Quantity: 30,  # refills: 0   Last office visit: 6/27/2019 with prescribing provider:     Future Office Visit:    Requested Prescriptions   Pending Prescriptions Disp Refills     omeprazole (PRILOSEC) 20 MG DR capsule [Pharmacy Med Name: OMEPRAZOLE 20MG CAPSULES] 90 capsule 0     Sig: TAKE ONE CAPSULE BY MOUTH DAILY       PPI Protocol Failed - 7/29/2019  5:56 PM        Failed - Medication is active on med list        Passed - Not on Clopidogrel (unless Pantoprazole ordered)        Passed - No diagnosis of osteoporosis on record        Passed - Recent (12 mo) or future (30 days) visit within the authorizing provider's specialty     Patient had office visit in the last 12 months or has a visit in the next 30 days with authorizing provider or within the authorizing provider's specialty.  See \"Patient Info\" tab in inbasket, or \"Choose Columns\" in Meds & Orders section of the refill encounter.              Passed - Patient is age 18 or older        Passed - No active pregnacy on record        Passed - No positive pregnancy test in past 12 months          "
Last Rx 10/2/17 - Omeprazole 20mg, #90 with 3 refills, but marked as discontinued 3/18/19     Is patient taking/requesting this? IPICO message sent to her    Camille CARTER RN    
Vielka response from patient:     Rodolfo Obrien-     I did stop the med- I was taking it for acid reflux and had to continually clear my throat. It had subsided for over a year, but the last few days have been brutal. However, I am now having a few other symptoms and I suspect its some post nasal drip from allergies or a cold. Today seems better, I will send you a message if I think I need to go back on it.     Thanks for your help,   Babita   
Yes...

## 2023-08-25 ENCOUNTER — RX ONLY (RX ONLY)
Age: 38
End: 2023-08-25

## 2023-08-25 RX ORDER — FINASTERIDE 5 MG/1
TABLET, FILM COATED ORAL
Qty: 92 | Refills: 4 | Status: ERX | COMMUNITY
Start: 2023-08-25

## 2023-08-26 DIAGNOSIS — L70.0 ACNE VULGARIS: ICD-10-CM

## 2023-08-28 RX ORDER — SPIRONOLACTONE 100 MG/1
100 TABLET, FILM COATED ORAL DAILY
Qty: 90 TABLET | Refills: 3 | Status: SHIPPED | OUTPATIENT
Start: 2023-08-28 | End: 2024-03-18

## 2023-09-07 ENCOUNTER — RX ONLY (RX ONLY)
Age: 38
End: 2023-09-07

## 2023-09-07 RX ORDER — BIMATOPROST 0.3 MG/ML
SOLUTION/ DROPS OPHTHALMIC
Qty: 5 | Refills: 11 | Status: ERX | COMMUNITY
Start: 2023-09-07

## 2023-09-07 RX ORDER — MUPIROCIN 20 MG/G
OINTMENT TOPICAL
Qty: 22 | Refills: 5 | Status: ERX | COMMUNITY
Start: 2023-09-07

## 2023-11-20 ENCOUNTER — RX ONLY (RX ONLY)
Age: 38
End: 2023-11-20

## 2023-11-20 RX ORDER — FLUOCINOLONE ACETONIDE 0.1 MG/ML
SOLUTION TOPICAL
Qty: 60 | Refills: 11 | Status: ERX | COMMUNITY
Start: 2023-11-20

## 2023-12-02 ENCOUNTER — MYC REFILL (OUTPATIENT)
Dept: FAMILY MEDICINE | Facility: CLINIC | Age: 38
End: 2023-12-02
Payer: COMMERCIAL

## 2023-12-02 DIAGNOSIS — F90.0 ADHD (ATTENTION DEFICIT HYPERACTIVITY DISORDER), INATTENTIVE TYPE: ICD-10-CM

## 2023-12-04 ENCOUNTER — MYC MEDICAL ADVICE (OUTPATIENT)
Dept: FAMILY MEDICINE | Facility: CLINIC | Age: 38
End: 2023-12-04
Payer: COMMERCIAL

## 2023-12-04 RX ORDER — DEXTROAMPHETAMINE SACCHARATE, AMPHETAMINE ASPARTATE, DEXTROAMPHETAMINE SULFATE AND AMPHETAMINE SULFATE 2.5; 2.5; 2.5; 2.5 MG/1; MG/1; MG/1; MG/1
15 TABLET ORAL 2 TIMES DAILY
Qty: 180 TABLET | Refills: 0 | OUTPATIENT
Start: 2023-12-04

## 2024-02-07 ENCOUNTER — RX ONLY (RX ONLY)
Age: 39
End: 2024-02-07

## 2024-02-07 RX ORDER — GLYCOPYRROLATE 1 MG/1
TABLET ORAL
Qty: 30 | Refills: 0 | Status: ERX | COMMUNITY
Start: 2024-02-07

## 2024-02-09 DIAGNOSIS — E03.9 HYPOTHYROIDISM, UNSPECIFIED TYPE: ICD-10-CM

## 2024-02-09 RX ORDER — LEVOTHYROXINE SODIUM 112 UG/1
112 TABLET ORAL DAILY
Qty: 90 TABLET | Refills: 0 | Status: SHIPPED | OUTPATIENT
Start: 2024-02-09 | End: 2024-02-12

## 2024-02-09 NOTE — TELEPHONE ENCOUNTER
Medication filled 1 time as pt is due for a follow-up in clinic. Pharmacy has been notified to inform patient to call clinic and schedule appointment.    Prescription approved per [facility] Refill Protocol.  Margareth Garcia RN  [facility] Triage Nurse

## 2024-02-10 ENCOUNTER — HEALTH MAINTENANCE LETTER (OUTPATIENT)
Age: 39
End: 2024-02-10

## 2024-02-12 ENCOUNTER — TELEPHONE (OUTPATIENT)
Dept: FAMILY MEDICINE | Facility: CLINIC | Age: 39
End: 2024-02-12

## 2024-02-12 DIAGNOSIS — K21.9 CHRONIC GERD: Primary | ICD-10-CM

## 2024-02-12 NOTE — TELEPHONE ENCOUNTER
TO PCP:     Pt called, asking if PCP would put in an order for an upper endoscopy - [facility] in [place]     Pt asks that we fax directly to this fax number: Fax: [phone]    Pt is concerned with her history of reflux and having a family member recently diagnosed with esophageal cancer    Pt states she's had ongoing acid reflux - tried various remedies over the counter and Rxs from PCP, but it seems to be worsening. Having indigestion and persisting heartburn after eating.     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 But without an order from PCP needs to do GI consult first but consult GI appointments are booked out months     Pt would like a call back and detailed message okay     Thank you     Camille CARTER, Triage RN  [facility]

## 2024-02-13 NOTE — TELEPHONE ENCOUNTER
Printed and faxed EGD order to below fax number  Called and notified pt of EGD order sent/faxed    Camille CARTER Triage RN  [facility]

## 2024-02-15 ENCOUNTER — TRANSFERRED RECORDS (OUTPATIENT)
Dept: HEALTH INFORMATION MANAGEMENT | Facility: CLINIC | Age: 39
End: 2024-02-15
Payer: COMMERCIAL

## 2024-03-12 ENCOUNTER — MYC MEDICAL ADVICE (OUTPATIENT)
Dept: FAMILY MEDICINE | Facility: CLINIC | Age: 39
End: 2024-03-12
Payer: COMMERCIAL

## 2024-03-13 NOTE — TELEPHONE ENCOUNTER
"Pt has not read MY CHART message yet or scheduled appt.    Routing to triage as someone \"doned\" it form our pool and may need followup.    Letha HOLGUIN MA     "

## 2024-03-13 NOTE — TELEPHONE ENCOUNTER
No ans to offer appt with Team (Olinda or Rylee ) today or tomorrow.    Sent MY CHART message to sched appt with Team before Monday 18th [facility] appt with Dr. Evans.    Letha HOLGUIN MA

## 2024-03-18 ENCOUNTER — OFFICE VISIT (OUTPATIENT)
Dept: FAMILY MEDICINE | Facility: CLINIC | Age: 39
End: 2024-03-18
Payer: COMMERCIAL

## 2024-03-18 VITALS
WEIGHT: 119.5 LBS | TEMPERATURE: 97.5 F | DIASTOLIC BLOOD PRESSURE: 88 MMHG | HEIGHT: 63 IN | OXYGEN SATURATION: 100 % | RESPIRATION RATE: 18 BRPM | BODY MASS INDEX: 21.17 KG/M2 | HEART RATE: 106 BPM | SYSTOLIC BLOOD PRESSURE: 137 MMHG

## 2024-03-18 DIAGNOSIS — E03.9 HYPOTHYROIDISM, UNSPECIFIED TYPE: ICD-10-CM

## 2024-03-18 DIAGNOSIS — Z98.890 S/P BILATERAL BREAST REDUCTION: ICD-10-CM

## 2024-03-18 DIAGNOSIS — K59.09 CHRONIC CONSTIPATION: ICD-10-CM

## 2024-03-18 DIAGNOSIS — R11.0 NAUSEA: ICD-10-CM

## 2024-03-18 DIAGNOSIS — Z79.899 CONTROLLED SUBSTANCE AGREEMENT SIGNED: ICD-10-CM

## 2024-03-18 DIAGNOSIS — L70.0 ACNE VULGARIS: ICD-10-CM

## 2024-03-18 DIAGNOSIS — F90.0 ADHD (ATTENTION DEFICIT HYPERACTIVITY DISORDER), INATTENTIVE TYPE: ICD-10-CM

## 2024-03-18 DIAGNOSIS — Z79.899 MEDICATION MANAGEMENT: ICD-10-CM

## 2024-03-18 DIAGNOSIS — Z13.0 SCREENING FOR DEFICIENCY ANEMIA: ICD-10-CM

## 2024-03-18 DIAGNOSIS — Z00.00 ROUTINE GENERAL MEDICAL EXAMINATION AT A HEALTH CARE FACILITY: Primary | ICD-10-CM

## 2024-03-18 DIAGNOSIS — R19.4 ALTERED BOWEL HABITS: ICD-10-CM

## 2024-03-18 DIAGNOSIS — E78.5 HYPERLIPIDEMIA, UNSPECIFIED HYPERLIPIDEMIA TYPE: ICD-10-CM

## 2024-03-18 LAB
ALBUMIN SERPL BCG-MCNC: 4.8 G/DL (ref 3.5–5.2)
ALP SERPL-CCNC: 48 U/L (ref 40–150)
ALT SERPL W P-5'-P-CCNC: 23 U/L (ref 0–50)
ANION GAP SERPL CALCULATED.3IONS-SCNC: 10 MMOL/L (ref 7–15)
AST SERPL W P-5'-P-CCNC: 23 U/L (ref 0–45)
BILIRUB SERPL-MCNC: 0.4 MG/DL
BUN SERPL-MCNC: 10.4 MG/DL (ref 6–20)
CALCIUM SERPL-MCNC: 9.1 MG/DL (ref 8.6–10)
CHLORIDE SERPL-SCNC: 104 MMOL/L (ref 98–107)
CHOLEST SERPL-MCNC: 210 MG/DL
CREAT SERPL-MCNC: 0.63 MG/DL (ref 0.51–0.95)
DEPRECATED HCO3 PLAS-SCNC: 23 MMOL/L (ref 22–29)
EGFRCR SERPLBLD CKD-EPI 2021: >90 ML/MIN/1.73M2
ERYTHROCYTE [DISTWIDTH] IN BLOOD BY AUTOMATED COUNT: 11.9 % (ref 10–15)
FASTING STATUS PATIENT QL REPORTED: YES
FERRITIN SERPL-MCNC: 106 NG/ML (ref 6–175)
GLUCOSE SERPL-MCNC: 94 MG/DL (ref 70–99)
HCT VFR BLD AUTO: 41.2 % (ref 35–47)
HDLC SERPL-MCNC: 65 MG/DL
HGB BLD-MCNC: 14 G/DL (ref 11.7–15.7)
LDLC SERPL CALC-MCNC: 127 MG/DL
MCH RBC QN AUTO: 33.1 PG (ref 26.5–33)
MCHC RBC AUTO-ENTMCNC: 34 G/DL (ref 31.5–36.5)
MCV RBC AUTO: 97 FL (ref 78–100)
NONHDLC SERPL-MCNC: 145 MG/DL
PLATELET # BLD AUTO: 289 10E3/UL (ref 150–450)
POTASSIUM SERPL-SCNC: 4.6 MMOL/L (ref 3.4–5.3)
PROT SERPL-MCNC: 7.1 G/DL (ref 6.4–8.3)
RBC # BLD AUTO: 4.23 10E6/UL (ref 3.8–5.2)
SODIUM SERPL-SCNC: 137 MMOL/L (ref 135–145)
TRIGL SERPL-MCNC: 92 MG/DL
TSH SERPL DL<=0.005 MIU/L-ACNC: 2.01 UIU/ML (ref 0.3–4.2)
WBC # BLD AUTO: 9.5 10E3/UL (ref 4–11)

## 2024-03-18 PROCEDURE — 99396 PREV VISIT EST AGE 40-64: CPT | Performed by: INTERNAL MEDICINE

## 2024-03-18 PROCEDURE — 84443 ASSAY THYROID STIM HORMONE: CPT | Performed by: INTERNAL MEDICINE

## 2024-03-18 PROCEDURE — 80061 LIPID PANEL: CPT | Performed by: INTERNAL MEDICINE

## 2024-03-18 PROCEDURE — 36415 COLL VENOUS BLD VENIPUNCTURE: CPT | Performed by: INTERNAL MEDICINE

## 2024-03-18 PROCEDURE — 99214 OFFICE O/P EST MOD 30 MIN: CPT | Mod: 25 | Performed by: INTERNAL MEDICINE

## 2024-03-18 PROCEDURE — 85027 COMPLETE CBC AUTOMATED: CPT | Performed by: INTERNAL MEDICINE

## 2024-03-18 PROCEDURE — 82728 ASSAY OF FERRITIN: CPT | Performed by: INTERNAL MEDICINE

## 2024-03-18 PROCEDURE — 80053 COMPREHEN METABOLIC PANEL: CPT | Performed by: INTERNAL MEDICINE

## 2024-03-18 RX ORDER — LEVOTHYROXINE SODIUM 112 UG/1
112 TABLET ORAL DAILY
Qty: 90 TABLET | Refills: 3 | Status: SHIPPED | OUTPATIENT
Start: 2024-03-18 | End: 2024-05-15

## 2024-03-18 RX ORDER — ONDANSETRON 4 MG/1
4 TABLET, ORALLY DISINTEGRATING ORAL EVERY 8 HOURS PRN
Qty: 30 TABLET | Refills: 1 | Status: SHIPPED | OUTPATIENT
Start: 2024-03-18

## 2024-03-18 RX ORDER — SPIRONOLACTONE 100 MG/1
100 TABLET, FILM COATED ORAL DAILY
Qty: 90 TABLET | Refills: 3 | Status: SHIPPED | OUTPATIENT
Start: 2024-03-18 | End: 2024-06-18

## 2024-03-18 SDOH — HEALTH STABILITY: PHYSICAL HEALTH: ON AVERAGE, HOW MANY DAYS PER WEEK DO YOU ENGAGE IN MODERATE TO STRENUOUS EXERCISE (LIKE A BRISK WALK)?: 3 DAYS

## 2024-03-18 ASSESSMENT — PATIENT HEALTH QUESTIONNAIRE - PHQ9
SUM OF ALL RESPONSES TO PHQ QUESTIONS 1-9: 2
SUM OF ALL RESPONSES TO PHQ QUESTIONS 1-9: 2
10. IF YOU CHECKED OFF ANY PROBLEMS, HOW DIFFICULT HAVE THESE PROBLEMS MADE IT FOR YOU TO DO YOUR WORK, TAKE CARE OF THINGS AT HOME, OR GET ALONG WITH OTHER PEOPLE: NOT DIFFICULT AT ALL

## 2024-03-18 ASSESSMENT — PAIN SCALES - GENERAL: PAINLEVEL: NO PAIN (0)

## 2024-03-18 ASSESSMENT — SOCIAL DETERMINANTS OF HEALTH (SDOH): HOW OFTEN DO YOU GET TOGETHER WITH FRIENDS OR RELATIVES?: ONCE A WEEK

## 2024-03-18 NOTE — PROGRESS NOTES
Preventive Care Visit  [facility]  Staci Evans MD, Internal Medicine  Mar 18, 2024      Babita was seen today for physical.    Diagnoses and all orders for this visit:    Routine general medical examination at a health care facility  Preventive health counseling was also done.  Mammogram is due and advised patient to schedule this at earliest convenience.   Last pap in 12/2020 - NIL   She declines for COVID-19 booster.     Hypothyroidism, unspecified type  -     levothyroxine (SYNTHROID/LEVOTHROID) 112 MCG tablet; Take 1 tablet (112 mcg) by mouth daily for hypothyroidism  She takes vitamin D supplement     ADHD (attention deficit hyperactivity disorder), inattentive type  On adderall 15 mg BID   She understands active medications are best for ADHD in adults  But she is comfortable with what she is taking  Patient was counseled on controlled substances and signed agreement.   Patient is aware of risks and dependence of the medication. She has never escalated the dosage.   Patient understands not to take the medication with alcohol.   We also require 6 month follow up and patient understands this.      Nausea  -     ondansetron (ZOFRAN ODT) 4 MG ODT tab; Take 1 tablet (4 mg) by mouth every 8 hours as needed for nausea    Acne vulgaris  -     spironolactone (ALDACTONE) 100 MG tablet; Take 1 tablet (100 mg) by mouth daily    Chronic constipation  She c/o changes in bowel movement with constipation, bloating and fullness x 1 month. She has not had bowel movement for past few days and is very concerned.     She is on spironolactone which can cause dehydration.   Take Miralax regularly   Colonoscopy ordered and advised patient to schedule this.    Altered bowel habits  -     Adult GI  Referral - Procedure Only; Future  See note above     Controlled substance agreement signed  On adderall 15 mg BID     S/P bilateral breast reduction  Past history     Other orders  -     REVIEW OF HEALTH  MAINTENANCE PROTOCOL ORDERS  -     PRIMARY CARE FOLLOW-UP SCHEDULING; Future    Other  Nexplanon insertion in left upper arm in 01/04/2022; patient will need to follow up with OB/GYN in 01/2025.   She continues to have menses    José Miguel Jc is a 38 year old, presenting for the following:  Physical         No data to display                 Health Care Directive  Patient does not have a Health Care Directive or Living Will: Discussed advance care planning with patient; however, patient declined at this time.    HPI  Babita is a 38 year old, presenting for the following:  Physical        3/18/2024   General Health   How would you rate your overall physical health? Good   Feel stress (tense, anxious, or unable to sleep) Only a little   (!) STRESS CONCERN      3/18/2024   Nutrition   Three or more servings of calcium each day? Yes   Diet: Regular (no restrictions)   How many servings of fruit and vegetables per day? (!) 2-3   How many sweetened beverages each day? 0-1         3/18/2024   Exercise   Days per week of moderate/strenous exercise 3 days         3/18/2024   Social Factors   Frequency of gathering with friends or relatives Once a week   Worry food won't last until get money to buy more No   Food not last or not have enough money for food? No   Do you have housing?  Yes   Are you worried about losing your housing? No   Lack of transportation? No   Unable to get utilities (heat,electricity)? No         3/18/2024   Dental   Dentist two times every year? Yes          Today's PHQ-9 Score:       3/18/2024     9:49 AM   PHQ-9 SCORE   PHQ-9 Total Score MyChart 2 (Minimal depression)   PHQ-9 Total Score 2         3/18/2024   Substance Use   Alcohol more than 3/day or more than 7/wk No   Do you use any other substances recreationally? No     Social History     Tobacco Use    Smoking status: Never    Smokeless tobacco: Never   Vaping Use    Vaping Use: Never used   Substance Use Topics    Alcohol use: Yes      "Comment: Once a week    Drug use: No             3/18/2024   Breast Cancer Screening   Family history of breast, colon, or ovarian cancer? No / Unknown      Mammogram Screening - Mammogram every 1-2 years updated in Health Maintenance based on mutual decision making        3/18/2024   STI Screening   New sexual partner(s) since last STI/HIV test? No     History of abnormal Pap smear: NO - age 30-65 PAP every 5 years with negative HPV co-testing recommended        Latest Ref Rng & Units 12/1/2020     4:00 PM 12/1/2020     3:28 PM 10/2/2017     1:09 PM   PAP / HPV   PAP (Historical)   NIL     HPV 16 DNA NEG^Negative Negative   Negative    HPV 18 DNA NEG^Negative Negative   Negative    Other HR HPV NEG^Negative Negative   Negative      ASCVD Risk   The 10-year ASCVD risk score (Juan A GARCÍA, et al., 2019) is: 0.5%    Values used to calculate the score:      Age: 38 years      Sex: Female      Is Non- : No      Diabetic: No      Tobacco smoker: No      Systolic Blood Pressure: 137 mmHg      Is BP treated: No      HDL Cholesterol: 50 mg/dL      Total Cholesterol: 158 mg/dL        3/18/2024   Contraception/Family Planning   Questions about contraception or family planning (!) YES         Reviewed and updated as needed this visit by Provider                        Review of Systems  Constitutional, HEENT, cardiovascular, pulmonary, GI, , musculoskeletal, neuro, skin, endocrine and psych systems are negative, except as otherwise noted.     Objective    Exam  /88 (BP Location: Right arm, Patient Position: Sitting, Cuff Size: Adult Regular)   Pulse 106   Temp 97.5  F (36.4  C) (Oral)   Resp 18   Ht 1.61 m (5' 3.39\")   Wt 54.2 kg (119 lb 8 oz)   SpO2 100%   BMI 20.91 kg/m     Estimated body mass index is 20.91 kg/m  as calculated from the following:    Height as of this encounter: 1.61 m (5' 3.39\").    Weight as of this encounter: 54.2 kg (119 lb 8 oz).    Physical Exam  GENERAL: " alert and no distress  EYES: Eyes grossly normal to inspection, PERRL and conjunctivae and sclerae normal  HENT: ear canals and TM's normal, nose and mouth without ulcers or lesions  NECK: no adenopathy, no asymmetry, masses, or scars  RESP: lungs clear to auscultation - no rales, rhonchi or wheezes  BREAST: normal without masses, tenderness or nipple discharge and no palpable axillary masses or adenopathy; scar of breast reduction bilaterally   CV: regular rate and rhythm, normal S1 S2, no S3 or S4, no murmur, click or rub, no peripheral edema  ABDOMEN: soft, nontender, no hepatosplenomegaly, no masses and bowel sounds normal  MS: no gross musculoskeletal defects noted, no edema  SKIN: no suspicious lesions or rashes  NEURO: Normal strength and tone, mentation intact and speech normal  PSYCH: mentation appears normal, affect normal/bright        Signed Electronically by: Staci Evans MD      This document serves as a record of the services and decisions personally performed and made by Dr. Evans. It was created on her behalf by Blake Goodwin, a trained medical scribe. The creation of this document is based the provider's statements to the medical scribe.

## 2024-03-18 NOTE — LETTER
[facility]  03/18/24  Patient: Babita Hartley  YOB: 1985  Medical Record Number: [id number]                                                                                  Non-Opioid Controlled Substance Agreement    This is an agreement between you and your provider regarding safe and appropriate use of controlled substances prescribed by your care team. Controlled substances are?medicines that can cause physical and mental dependence (abuse).     There are strict laws about having and using these medicines. We here at [facility] are  committed to working with you in your efforts to get better. To support you in this work, we'll help you schedule regular office appointments for medicine refills. If we must cancel or change your appointment for any reason, we'll make sure you have enough medicine to last until your next appointment.     As a Provider, I will:   Listen carefully to your concerns while treating you with respect.   Recommend a treatment plan that I believe is in your best interest and may involve therapies other than medicine.    Talk with you often about the possible benefits and the risk of harm of any medicine that we prescribe for you.  Assess the safety of this medicine and check how well it works.    Provide a plan on how to taper (discontinue or go off) using this medicine if the decision is made to stop its use.      ::  As a Patient, I understand controlled substances:     Are prescribed by my care provider to help me function or work and manage my condition(s).?  Are strong medicines and can cause serious side effects.     Need to be taken exactly as prescribed.?Combining controlled substances with certain medicines or chemicals (such as illegal drugs, alcohol, sedatives, sleeping pills, and benzodiazepines) can be dangerous or even fatal.? If I stop taking my medicines suddenly, I may have severe withdrawal symptoms.     The risks, benefits, and  side effects of these medicine(s) were explained to me. I agree that:    I will take part in other treatments as advised by my care team. This may be psychiatry or counseling, physical therapy, behavioral therapy, group treatment or a referral to specialist.    I will keep all my appointments and understand this is part of the monitoring of controlled substances.?My care team may require an office visit for EVERY controlled substance refill. If I miss appointments or don t follow instructions, my care team may stop my medicine    I will take my medicines as prescribed. I will not change the dose or schedule unless my care team tells me to. There will be no refills if I run out early.      I may be asked to come to the clinic and complete a urine drug test or complete a pill count. If I don t give a urine sample or participate in a pill count, the care team may stop my medicine.    I will only receive controlled substance prescriptions from this clinic. If I am treated by another provider, I will tell them that I am taking controlled substances and that I have a treatment agreement with this provider. I will inform my [facility] care team within one business day if I am given a prescription for any controlled substance by another healthcare provider. My [facility] care team can contact other providers and pharmacists about my use of any medicines.    It is up to me to make sure that I don't run out of my medicines on weekends or holidays.?If my care team is willing to refill my prescription without a visit, I must request refills only during office hours. Refills may take up to 3 business days to process. I will use one pharmacy to fill all my controlled substance prescriptions. I will notify the clinic about any changes to my insurance or medicine availability.    I am responsible for my prescriptions. If the medicine/prescription is lost, stolen or destroyed, it will not be replaced.?I also agree not  to share controlled substance medicines with anyone.     I am aware I should not use any illegal or recreational drugs. I agree not to drink alcohol unless my care team says I can.     If I enroll in the [place] Medical Cannabis program, I will tell my care team before my next refill.    I will tell my care team right away if I become pregnant, have a new medical problem treated outside of my regular clinic, or have a change in my medicines.     I understand that this medicine can affect my thinking, judgment and reaction time.? Alcohol and drugs affect the brain and body, which can affect the safety of my driving. Being under the influence of alcohol or drugs can affect my decision-making, behaviors, personal safety and the safety of others. Driving while impaired (DWI) can occur if a person is driving, operating or in physical control of a car, motorcycle, boat, snowmobile, ATV, motorbike, off-road vehicle or any other motor vehicle ([place] Statute 169A.20). I understand the risk if I choose to drive or operate any vehicle or machinery.    I understand that if I do not follow any of the conditions above, my prescriptions or treatment may be stopped or changed.   I agree that my provider, clinic care team and pharmacy may work with any city, state or federal law enforcement agency that investigates the misuse, sale or other diversion of my controlled medicine. I will allow my provider to discuss my care with, or share a copy of, this agreement with any other treating provider, pharmacy or emergency room where I receive care.     I have read this agreement and have asked questions about anything I did not understand.    ________________________________________________________  Patient Signature - Babita Hartley     ___________________                   Date     ________________________________________________________  Provider Signature - Staci vEans MD       ___________________                   Date      ________________________________________________________  Witness Signature (required if provider not present while patient signing)          ___________________                   Date

## 2024-03-19 ENCOUNTER — TELEPHONE (OUTPATIENT)
Dept: GASTROENTEROLOGY | Facility: CLINIC | Age: 39
End: 2024-03-19
Payer: COMMERCIAL

## 2024-03-19 ENCOUNTER — HOSPITAL ENCOUNTER (OUTPATIENT)
Facility: CLINIC | Age: 39
End: 2024-03-19
Attending: INTERNAL MEDICINE | Admitting: INTERNAL MEDICINE
Payer: COMMERCIAL

## 2024-03-19 NOTE — TELEPHONE ENCOUNTER
"Endoscopy Scheduling Screen    Have you had a positive Covid test in the last 14 days?  No    What is your communication preference for Instructions and/or Bowel Prep?   MyChart    What insurance is in the chart?  Other:  [facility]     Ordering/Referring Provider: ROSANA HELLER    (If ordering provider performs procedure, schedule with ordering provider unless otherwise instructed. )    BMI: Estimated body mass index is 20.91 kg/m  as calculated from the following:    Height as of 3/18/24: 1.61 m (5' 3.39\").    Weight as of 3/18/24: 54.2 kg (119 lb 8 oz).     Sedation Ordered  moderate sedation.   If patient BMI > 50 do not schedule in [facility].    If patient BMI > 45 do not schedule at [facility].    Are you taking methadone or Suboxone?  No    Have you had difficulties, pain, or discomfort during past endoscopy procedures?  No    Are you taking any prescription medications for pain 3 or more times per week?   NO, No RN review required.    Do you have a history of malignant hyperthermia?  No    (Females) Are you currently pregnant?   No     Have you been diagnosed or told you have pulmonary hypertension?   No    Do you have an LVAD?  No    Have you been told you have moderate to severe sleep apnea?  No    Have you been told you have COPD, asthma, or any other lung disease?  No    Do you have any heart conditions?  No     Have you ever had or are you waiting for an organ transplant?  No. Continue scheduling, no site restrictions.    Have you had a stroke or transient ischemic attack (TIA aka \"mini stroke\" in the last 6 months?   No    Have you been diagnosed with or been told you have cirrhosis of the liver?   No    Are you currently on dialysis?   No    Do you need assistance transferring?   No    BMI: Estimated body mass index is 20.91 kg/m  as calculated from the following:    Height as of 3/18/24: 1.61 m (5' 3.39\").    Weight as of 3/18/24: 54.2 kg (119 lb 8 oz).     Is patients BMI > 40 and scheduling location [facility]?  No    Do " you take an injectable medication for weight loss or diabetes (excluding insulin)?  No    Do you take the medication Naltrexone?  No    Do you take blood thinners?  No       Prep   Are you currently on dialysis or do you have chronic kidney disease?  No    Do you have a diagnosis of diabetes?  No    Do you have a diagnosis of cystic fibrosis (CF)?  No    On a regular basis do you go 3 -5 days between bowel movements?  Yes (Extended Prep)    BMI > 40?  No    Preferred Pharmacy:    [id number] [place], [place] - [address] [place] [address]  [address] [address] [address]  Phone: [phone] Fax: [phone]      Final Scheduling Details     Procedure scheduled  Colonoscopy    Surgeon:  Tommie      Date of procedure:  03/22/2024     Pre-OP / PAC:   No - Not required for this site.    Location  [facility] - Patient preference.    Sedation   Moderate Sedation - Per order.      Patient Reminders:   You will receive a call from a Nurse to review instructions and health history.  This assessment must be completed prior to your procedure.  Failure to complete the Nurse assessment may result in the procedure being cancelled.      On the day of your procedure, please designate an adult(s) who can drive you home stay with you for the next 24 hours. The medicines used in the exam will make you sleepy. You will not be able to drive.      You cannot take public transportation, ride share services, or non-medical taxi service without a responsible caregiver.  Medical transport services are allowed with the requirement that a responsible caregiver will receive you at your destination.  We require that drivers and caregivers are confirmed prior to your procedure.

## 2024-03-19 NOTE — RESULT ENCOUNTER NOTE
Zahra Jc    This is to inform you regarding your test result.    .Your total cholesterol is elevated.  HDL which is called good cholesterol is normal.  Your LDL which is called bad cholesterol is elevated.  Eat low cholesterol low fat  diet and do regular physical activity.  CBC result which includes Hemoglobin and  Platelet Counts is satisfactory.  TSH which is thyroid hormone is normal.  Stay on current dose of levothyroxine.  Recheck TSH in 6 months  The testing of your blood sugar, kidney function, liver function and electrolytes was normal.          Sincerely,      Dr.Nasima Nathan MD,FACP

## 2024-03-21 ENCOUNTER — TELEPHONE (OUTPATIENT)
Dept: GASTROENTEROLOGY | Facility: CLINIC | Age: 39
End: 2024-03-21
Payer: COMMERCIAL

## 2024-03-21 NOTE — TELEPHONE ENCOUNTER
Caller: Babita    Reason for Reschedule/Cancellation   (please be detailed, any staff messages or encounters to note?): Patient no longer wants procedure at this time.      Prior to reschedule please review:  Ordering Provider:     Staci Evans MD in  FAMILY PRAC/IM     Sedation Determined: moderate  Does patient have any ASC Exclusions, please identify?: n      Notes on Cancelled Procedure:  Procedure: Lower Endoscopy [Colonoscopy]   Date: 03/22/2024  Location: [facility]; [address] [place] [address], [address] Floor, [place] [id number]   Surgeon: Tommie       Rescheduled: No,         Did you cancel or rescheduled an [facility] procedure? No.

## 2024-03-25 ENCOUNTER — TRANSFERRED RECORDS (OUTPATIENT)
Dept: HEALTH INFORMATION MANAGEMENT | Facility: CLINIC | Age: 39
End: 2024-03-25
Payer: COMMERCIAL

## 2024-04-06 NOTE — TELEPHONE ENCOUNTER
Routing refill request to provider for review/approval because:  Labs out of range:    TSH   Date Value Ref Range Status   12/01/2020 0.36 (L) 0.40 - 4.00 mU/L Final       Last office vist: 12/1/2020    Pended 90 day supply & 1 refills.    Next office visit: none      Himanshu Gunn RN  [facility]           Statement Selected

## 2024-04-09 ENCOUNTER — MYC MEDICAL ADVICE (OUTPATIENT)
Dept: FAMILY MEDICINE | Facility: CLINIC | Age: 39
End: 2024-04-09

## 2024-04-09 ENCOUNTER — MYC REFILL (OUTPATIENT)
Dept: FAMILY MEDICINE | Facility: CLINIC | Age: 39
End: 2024-04-09

## 2024-04-09 DIAGNOSIS — F90.0 ADHD (ATTENTION DEFICIT HYPERACTIVITY DISORDER), INATTENTIVE TYPE: ICD-10-CM

## 2024-04-09 RX ORDER — DEXTROAMPHETAMINE SACCHARATE, AMPHETAMINE ASPARTATE, DEXTROAMPHETAMINE SULFATE AND AMPHETAMINE SULFATE 2.5; 2.5; 2.5; 2.5 MG/1; MG/1; MG/1; MG/1
15 TABLET ORAL 2 TIMES DAILY
Qty: 270 TABLET | Refills: 0 | OUTPATIENT
Start: 2024-04-09

## 2024-04-10 NOTE — TELEPHONE ENCOUNTER
Sent patient above information via [employer].     Julio Sifuentes RN  [facility]

## 2024-04-23 ENCOUNTER — RX ONLY (RX ONLY)
Age: 39
End: 2024-04-23

## 2024-04-23 RX ORDER — BIMATOPROST 0.3 MG/ML
SOLUTION/ DROPS OPHTHALMIC
Qty: 5 | Refills: 11 | Status: ACTIVE

## 2024-05-15 ENCOUNTER — MYC REFILL (OUTPATIENT)
Dept: FAMILY MEDICINE | Facility: CLINIC | Age: 39
End: 2024-05-15

## 2024-05-15 ENCOUNTER — RX ONLY (RX ONLY)
Age: 39
End: 2024-05-15

## 2024-05-15 DIAGNOSIS — E03.9 HYPOTHYROIDISM, UNSPECIFIED TYPE: ICD-10-CM

## 2024-05-15 RX ORDER — MINOXIDIL 2.5 MG/1
TABLET ORAL
Qty: 45 | Refills: 3 | Status: ERX

## 2024-05-15 RX ORDER — LEVOTHYROXINE SODIUM 112 UG/1
112 TABLET ORAL DAILY
Qty: 90 TABLET | Refills: 0 | Status: SHIPPED | OUTPATIENT
Start: 2024-05-15 | End: 2024-08-13

## 2024-06-18 ENCOUNTER — MYC REFILL (OUTPATIENT)
Dept: FAMILY MEDICINE | Facility: CLINIC | Age: 39
End: 2024-06-18

## 2024-06-18 DIAGNOSIS — L70.0 ACNE VULGARIS: ICD-10-CM

## 2024-06-19 RX ORDER — SPIRONOLACTONE 100 MG/1
100 TABLET, FILM COATED ORAL DAILY
Qty: 90 TABLET | Refills: 1 | Status: SHIPPED | OUTPATIENT
Start: 2024-06-19

## 2024-07-09 ENCOUNTER — RX ONLY (RX ONLY)
Age: 39
End: 2024-07-09

## 2024-07-09 RX ORDER — IPRATROPIUM BROMIDE 42 UG/1
SPRAY NASAL
Qty: 15 | Refills: 11 | Status: ACTIVE

## 2024-08-13 DIAGNOSIS — E03.9 HYPOTHYROIDISM, UNSPECIFIED TYPE: ICD-10-CM

## 2024-08-13 RX ORDER — LEVOTHYROXINE SODIUM 112 UG/1
112 TABLET ORAL DAILY
Qty: 90 TABLET | Refills: 1 | Status: SHIPPED | OUTPATIENT
Start: 2024-08-13

## 2024-08-28 ENCOUNTER — RX ONLY (RX ONLY)
Age: 39
End: 2024-08-28

## 2024-08-28 RX ORDER — FLUOROURACIL 2 G/40G
CREAM TOPICAL
Qty: 40 | Refills: 1 | Status: ERX

## 2024-09-20 ENCOUNTER — VIRTUAL VISIT (OUTPATIENT)
Dept: FAMILY MEDICINE | Facility: CLINIC | Age: 39
End: 2024-09-20

## 2024-09-20 DIAGNOSIS — F90.0 ADHD (ATTENTION DEFICIT HYPERACTIVITY DISORDER), INATTENTIVE TYPE: Primary | ICD-10-CM

## 2024-09-20 DIAGNOSIS — E03.9 HYPOTHYROIDISM, UNSPECIFIED TYPE: ICD-10-CM

## 2024-09-20 DIAGNOSIS — E78.5 HYPERLIPIDEMIA, UNSPECIFIED HYPERLIPIDEMIA TYPE: ICD-10-CM

## 2024-09-20 DIAGNOSIS — R11.0 NAUSEA: ICD-10-CM

## 2024-09-20 DIAGNOSIS — J30.2 SEASONAL ALLERGIES: ICD-10-CM

## 2024-09-20 PROCEDURE — 99441 PR PHYSICIAN TELEPHONE EVALUATION 5-10 MIN: CPT | Mod: 93 | Performed by: INTERNAL MEDICINE

## 2024-09-20 RX ORDER — CETIRIZINE HYDROCHLORIDE 10 MG/1
10 TABLET ORAL DAILY PRN
Qty: 90 TABLET | Refills: 3 | Status: SHIPPED | OUTPATIENT
Start: 2024-09-20

## 2024-09-20 RX ORDER — ONDANSETRON 8 MG/1
8 TABLET, FILM COATED ORAL EVERY 8 HOURS PRN
Qty: 15 TABLET | Refills: 1 | Status: SHIPPED | OUTPATIENT
Start: 2024-09-20

## 2024-09-20 RX ORDER — DEXTROAMPHETAMINE SACCHARATE, AMPHETAMINE ASPARTATE, DEXTROAMPHETAMINE SULFATE AND AMPHETAMINE SULFATE 3.75; 3.75; 3.75; 3.75 MG/1; MG/1; MG/1; MG/1
15 TABLET ORAL 2 TIMES DAILY
Qty: 60 TABLET | Refills: 0 | Status: SHIPPED | OUTPATIENT
Start: 2024-10-07

## 2024-09-20 ASSESSMENT — PATIENT HEALTH QUESTIONNAIRE - PHQ9: SUM OF ALL RESPONSES TO PHQ QUESTIONS 1-9: 0

## 2024-09-20 NOTE — PATIENT INSTRUCTIONS
Schedule TSH testing at your earliest convenience  I switched your adderall from 10 mg 1.5 tablets to 15 mg twice a day  I sent the script of zyrtec to pharmacy as you are taking it regularly for your seasonal allergies  I also renewed your zofran which you requested    Follow up in 6 months.  Seek sooner medical attention if there is any worsening of symptoms or problems.

## 2024-09-20 NOTE — PROGRESS NOTES
Babita is a 39 year old who is being evaluated via a billable telephone visit.    What phone number would you like to be contacted at? [phone] (M)    How would you like to obtain your AVS? Vielka  Originating Location (pt. Location): Home    Distant Location (provider location):  On-site    Assessment & Plan     Babita was seen today for follow up.    Diagnoses and all orders for this visit:    ADHD (attention deficit hyperactivity disorder), inattentive type  -     amphetamine-dextroamphetamine (ADDERALL) 15 MG tablet; Take 1 tablet (15 mg) by mouth 2 times daily.  Patient is on 10 mg 1.5 tablet twice a day, so we switched her to 15 mg twice a day which is same amount   so she will not have to split the pill    Hypothyroidism, unspecified type  -     TSH with free T4 reflex; Future  Due for TSH testing she is on levothyroxine    Hyperlipidemia, unspecified hyperlipidemia type  Will recheck at the time of her physical    Seasonal allergies  -     cetirizine (ZYRTEC) 10 MG tablet; Take 1 tablet (10 mg) by mouth daily as needed for allergies.  She requested renewal as she is followed by allergy specialist and taking it every day and she can use her HSA money in that way    Nausea  -     ondansetron (ZOFRAN) 8 MG tablet; Take 1 tablet (8 mg) by mouth every 8 hours as needed for nausea.  She wants to have this available as sometimes she feels nauseous        She requested chest x-ray for screening of lung cancer and I told her lung cancer screening guidelines chest x-ray is not indicated  Her age does not qualify her for lung cancer screening at this point   lung cancer screening is for patients who have a 20-pack-year history of smoking after age 55          See Patient Instructions  Patient Instructions   Schedule TSH testing at your earliest convenience  I switched your adderall from 10 mg 1.5 tablets to 15 mg twice a day  I sent the script of zyrtec to pharmacy as you are taking it regularly for your seasonal  allergies  I also renewed your zofran which you requested    Follow up in 6 months.  Seek sooner medical attention if there is any worsening of symptoms or problems.       José Miguel Jc is a 39 year old, presenting for the following health issues:  Follow Up    HPI       Patient requested refill of Zyrtec and Zofran  Review of Systems  Constitutional, HEENT, cardiovascular, pulmonary, GI, , musculoskeletal, neuro, skin, endocrine and psych systems are negative, except as otherwise noted.      Objective    Vitals - Patient Reported  Pain Score: No Pain (0)      Vitals:  No vitals were obtained today due to virtual visit.    Physical Exam   General: Alert and no distress //Respiratory: No audible wheeze, cough, or shortness of breath // Psychiatric:  Appropriate affect, tone, and pace of words            Phone call duration: 7 minutes  Signed Electronically by: Staci Evans MD

## 2024-10-03 ENCOUNTER — RX ONLY (RX ONLY)
Age: 39
End: 2024-10-03

## 2024-10-03 RX ORDER — SPIRONOLACTONE 100 MG/1
TABLET, FILM COATED ORAL
Qty: 180 | Refills: 3 | Status: ERX | COMMUNITY
Start: 2024-10-03

## 2024-10-08 ENCOUNTER — ANCILLARY PROCEDURE (OUTPATIENT)
Dept: MAMMOGRAPHY | Facility: CLINIC | Age: 39
End: 2024-10-08
Attending: INTERNAL MEDICINE

## 2024-10-08 ENCOUNTER — MYC REFILL (OUTPATIENT)
Dept: FAMILY MEDICINE | Facility: CLINIC | Age: 39
End: 2024-10-08

## 2024-10-08 DIAGNOSIS — Z12.31 VISIT FOR SCREENING MAMMOGRAM: ICD-10-CM

## 2024-10-08 DIAGNOSIS — F90.0 ADHD (ATTENTION DEFICIT HYPERACTIVITY DISORDER), INATTENTIVE TYPE: ICD-10-CM

## 2024-10-08 PROCEDURE — 77067 SCR MAMMO BI INCL CAD: CPT | Mod: TC | Performed by: RADIOLOGY

## 2024-10-08 PROCEDURE — 77063 BREAST TOMOSYNTHESIS BI: CPT | Mod: TC | Performed by: RADIOLOGY

## 2024-10-09 RX ORDER — DEXTROAMPHETAMINE SACCHARATE, AMPHETAMINE ASPARTATE, DEXTROAMPHETAMINE SULFATE AND AMPHETAMINE SULFATE 3.75; 3.75; 3.75; 3.75 MG/1; MG/1; MG/1; MG/1
15 TABLET ORAL 2 TIMES DAILY
Qty: 60 TABLET | Refills: 0 | Status: SHIPPED | OUTPATIENT
Start: 2024-10-09 | End: 2024-10-28

## 2024-10-29 ENCOUNTER — RX ONLY (RX ONLY)
Age: 39
End: 2024-10-29

## 2024-10-29 RX ORDER — AMOXICILLIN 875 MG/1
TABLET, FILM COATED ORAL
Qty: 20 | Refills: 0 | Status: ERX

## 2024-11-27 ENCOUNTER — RX ONLY (RX ONLY)
Age: 39
End: 2024-11-27

## 2024-11-27 RX ORDER — IPRATROPIUM BROMIDE 42 UG/1
SPRAY NASAL
Qty: 15 | Refills: 2 | Status: ERX

## 2024-12-04 ENCOUNTER — MYC REFILL (OUTPATIENT)
Dept: FAMILY MEDICINE | Facility: CLINIC | Age: 39
End: 2024-12-04
Payer: COMMERCIAL

## 2024-12-04 DIAGNOSIS — L70.0 ACNE VULGARIS: ICD-10-CM

## 2024-12-04 DIAGNOSIS — F90.0 ADHD (ATTENTION DEFICIT HYPERACTIVITY DISORDER), INATTENTIVE TYPE: ICD-10-CM

## 2024-12-04 DIAGNOSIS — J30.2 SEASONAL ALLERGIES: ICD-10-CM

## 2024-12-04 RX ORDER — CETIRIZINE HYDROCHLORIDE 10 MG/1
10 TABLET ORAL DAILY PRN
Qty: 90 TABLET | Refills: 3 | OUTPATIENT
Start: 2024-12-04

## 2024-12-04 RX ORDER — DEXTROAMPHETAMINE SACCHARATE, AMPHETAMINE ASPARTATE, DEXTROAMPHETAMINE SULFATE AND AMPHETAMINE SULFATE 3.75; 3.75; 3.75; 3.75 MG/1; MG/1; MG/1; MG/1
15 TABLET ORAL 2 TIMES DAILY
Qty: 60 TABLET | Refills: 0 | Status: SHIPPED | OUTPATIENT
Start: 2024-12-04

## 2024-12-04 RX ORDER — SPIRONOLACTONE 100 MG/1
100 TABLET, FILM COATED ORAL DAILY
Qty: 90 TABLET | Refills: 1 | Status: SHIPPED | OUTPATIENT
Start: 2024-12-04

## 2024-12-05 ENCOUNTER — RX ONLY (RX ONLY)
Age: 39
End: 2024-12-05

## 2024-12-05 RX ORDER — FLUTICASONE PROPIONATE 50 UG/1
SPRAY, METERED NASAL
Qty: 15.8 | Refills: 11 | Status: ERX | COMMUNITY
Start: 2024-12-05

## 2025-01-02 ENCOUNTER — RX ONLY (RX ONLY)
Age: 40
End: 2025-01-02

## 2025-01-02 RX ORDER — TAZAROTENE 0.1 MG/G
CREAM CUTANEOUS
Qty: 30 | Refills: 5 | Status: CANCELLED | COMMUNITY
Start: 2025-01-02

## 2025-01-02 RX ORDER — TAZAROTENE 0.1 MG/G
CREAM CUTANEOUS
Qty: 30 | Refills: 5 | Status: ERX | COMMUNITY
Start: 2025-01-02

## 2025-01-07 ENCOUNTER — MYC REFILL (OUTPATIENT)
Dept: FAMILY MEDICINE | Facility: CLINIC | Age: 40
End: 2025-01-07
Payer: COMMERCIAL

## 2025-01-07 DIAGNOSIS — G47.00 INSOMNIA, UNSPECIFIED TYPE: ICD-10-CM

## 2025-01-07 DIAGNOSIS — F90.0 ADHD (ATTENTION DEFICIT HYPERACTIVITY DISORDER), INATTENTIVE TYPE: ICD-10-CM

## 2025-01-07 RX ORDER — DEXTROAMPHETAMINE SACCHARATE, AMPHETAMINE ASPARTATE, DEXTROAMPHETAMINE SULFATE AND AMPHETAMINE SULFATE 3.75; 3.75; 3.75; 3.75 MG/1; MG/1; MG/1; MG/1
15 TABLET ORAL 2 TIMES DAILY
Qty: 60 TABLET | Refills: 0 | Status: SHIPPED | OUTPATIENT
Start: 2025-01-07

## 2025-01-07 RX ORDER — TRAZODONE HYDROCHLORIDE 50 MG/1
75 TABLET, FILM COATED ORAL AT BEDTIME
Qty: 45 TABLET | Refills: 3 | OUTPATIENT
Start: 2025-01-07

## 2025-01-21 ENCOUNTER — RX ONLY (RX ONLY)
Age: 40
End: 2025-01-21

## 2025-01-21 RX ORDER — AMOXICILLIN AND CLAVULANATE POTASSIUM 500; 125 MG/1; 1/1
TABLET, FILM COATED ORAL
Qty: 20 | Refills: 0 | Status: ERX | COMMUNITY
Start: 2025-01-21

## 2025-02-11 ENCOUNTER — RX ONLY (RX ONLY)
Age: 40
End: 2025-02-11

## 2025-02-11 RX ORDER — SULFAMETHOXAZOLE AND TRIMETHOPRIM 800; 160 MG/1; MG/1
TABLET ORAL
Qty: 14 | Refills: 5 | Status: ERX

## 2025-02-11 RX ORDER — SPIRONOLACTONE 100 MG/1
TABLET, FILM COATED ORAL
Qty: 90 | Refills: 3 | Status: ERX

## 2025-02-11 RX ORDER — TAZAROTENE/NIACINAMIDE 0.1 %-4 %
CREAM (GRAM) TOPICAL
Qty: 30 | Refills: 5 | Status: ERX | COMMUNITY
Start: 2025-02-11

## 2025-02-18 ENCOUNTER — RX ONLY (RX ONLY)
Age: 40
End: 2025-02-18

## 2025-02-18 RX ORDER — FLUCONAZOLE 200 MG/1
TABLET ORAL
Qty: 1 | Refills: 4 | Status: ERX | COMMUNITY
Start: 2025-02-18

## 2025-02-25 ENCOUNTER — RX ONLY (RX ONLY)
Age: 40
End: 2025-02-25

## 2025-02-25 RX ORDER — NITROFURANTOIN MONOHYDRATE/MACROCRYSTALLINE 25; 75 MG/1; MG/1
CAPSULE ORAL
Qty: 7 | Refills: 3 | Status: ERX | COMMUNITY
Start: 2025-02-25

## 2025-03-12 ENCOUNTER — RX ONLY (RX ONLY)
Age: 40
End: 2025-03-12

## 2025-03-12 RX ORDER — IPRATROPIUM BROMIDE 42 UG/1
SPRAY NASAL
Qty: 15 | Refills: 11 | Status: ERX

## 2025-03-20 ENCOUNTER — APPOINTMENT (OUTPATIENT)
Dept: URBAN - METROPOLITAN AREA CLINIC 257 | Age: 40
Setting detail: DERMATOLOGY
End: 2025-03-23

## 2025-03-20 DIAGNOSIS — Z41.9 ENCOUNTER FOR PROCEDURE FOR PURPOSES OTHER THAN REMEDYING HEALTH STATE, UNSPECIFIED: ICD-10-CM

## 2025-03-20 PROCEDURE — OTHER INVENTORY: OTHER

## 2025-03-20 PROCEDURE — OTHER DYSPORT: OTHER

## 2025-03-20 NOTE — PROCEDURE: DYSPORT
Show Mentalis Units: No
Left Periorbital Skin Units: 0
Show Periorbital Units: Yes
Forehead Units: 6
Additional Comments: \\n\\n40% discount for employee 
Additional Area 1 Location: Upper and Lower lip
Consent: Written consent obtained. Risks include but not limited to lid/brow ptosis, bruising, swelling, diplopia, temporary effect, incomplete chemical denervation.
Detail Level: Detailed
Glabellar Complex Units: 10
Post-Care Instructions: Patient instructed to not lie down for 4 hours and limit physical activity for 24 hours.
Additional Area 1 Units: 14
Additional Area 2 Location: Chin
Show Inventory Tab: Hide
Additional Area 2 Units: 3
Dilution (U/0.1 Cc): 25

## 2025-03-20 NOTE — HPI: COSMETIC (DYSPORT)
previous_has_had_dysport
Additional History: Patient presents today for dysport. Patient states her last treatment was well tolerated. Patient denies any other concerns.

## 2025-06-04 ENCOUNTER — RX ONLY (RX ONLY)
Age: 40
End: 2025-06-04

## 2025-06-04 RX ORDER — MINOXIDIL 2.5 MG/1
TABLET ORAL
Qty: 45 | Refills: 3 | Status: ERX

## 2025-07-10 ENCOUNTER — RX ONLY (RX ONLY)
Age: 40
End: 2025-07-10

## 2025-07-10 ENCOUNTER — APPOINTMENT (OUTPATIENT)
Dept: URBAN - METROPOLITAN AREA CLINIC 257 | Age: 40
Setting detail: DERMATOLOGY
End: 2025-07-10

## 2025-07-10 DIAGNOSIS — Z41.9 ENCOUNTER FOR PROCEDURE FOR PURPOSES OTHER THAN REMEDYING HEALTH STATE, UNSPECIFIED: ICD-10-CM

## 2025-07-10 PROCEDURE — OTHER DYSPORT: OTHER

## 2025-07-10 PROCEDURE — OTHER INVENTORY: OTHER

## 2025-07-10 PROCEDURE — OTHER BOTOX: OTHER

## 2025-07-10 RX ORDER — MINOXIDIL 2.5 MG/1
TABLET ORAL
Qty: 45 | Refills: 3 | Status: ERX

## 2025-09-04 ENCOUNTER — RX ONLY (RX ONLY)
Age: 40
End: 2025-09-04

## 2025-09-04 RX ORDER — KETOCONAZOLE 20 MG/ML
SHAMPOO, SUSPENSION TOPICAL
Qty: 120 | Refills: 1 | Status: ACTIVE

## 2025-09-04 RX ORDER — FLUOCINOLONE ACETONIDE 0.1 MG/ML
SOLUTION TOPICAL
Qty: 60 | Refills: 0 | Status: ACTIVE

## 2025-09-04 RX ORDER — LATANOPROST 100 %
OIL (GRAM) MISCELLANEOUS
Qty: 3.5 | Refills: 0 | Status: ERX | COMMUNITY
Start: 2025-09-04

## (undated) RX ORDER — LIDOCAINE HYDROCHLORIDE 10 MG/ML
INJECTION, SOLUTION EPIDURAL; INFILTRATION; INTRACAUDAL; PERINEURAL
Status: DISPENSED
Start: 2020-02-24

## (undated) RX ORDER — DEXAMETHASONE SODIUM PHOSPHATE 10 MG/ML
INJECTION, SOLUTION INTRAMUSCULAR; INTRAVENOUS
Status: DISPENSED
Start: 2020-02-24

## (undated) RX ORDER — LIDOCAINE HYDROCHLORIDE 10 MG/ML
INJECTION, SOLUTION EPIDURAL; INFILTRATION; INTRACAUDAL; PERINEURAL
Status: DISPENSED
Start: 2020-01-17

## (undated) RX ORDER — TRIAMCINOLONE ACETONIDE 40 MG/ML
INJECTION, SUSPENSION INTRA-ARTICULAR; INTRAMUSCULAR
Status: DISPENSED
Start: 2020-01-17